# Patient Record
Sex: MALE | Race: WHITE | NOT HISPANIC OR LATINO | Employment: OTHER | URBAN - METROPOLITAN AREA
[De-identification: names, ages, dates, MRNs, and addresses within clinical notes are randomized per-mention and may not be internally consistent; named-entity substitution may affect disease eponyms.]

---

## 2020-07-09 ENCOUNTER — HOSPITAL ENCOUNTER (INPATIENT)
Facility: HOSPITAL | Age: 80
LOS: 8 days | Discharge: NURSING, REHAB OR LONG-TERM CARE | DRG: 312 | End: 2020-07-17
Attending: EMERGENCY MEDICINE | Admitting: INTERNAL MEDICINE
Payer: MEDICARE

## 2020-07-09 DIAGNOSIS — R55 SYNCOPE, UNSPECIFIED SYNCOPE TYPE: Primary | ICD-10-CM

## 2020-07-09 DIAGNOSIS — R00.1 SYMPTOMATIC BRADYCARDIA: ICD-10-CM

## 2020-07-09 PROBLEM — F03.90 DEMENTIA: Status: ACTIVE | Noted: 2020-07-09

## 2020-07-09 PROBLEM — N18.30 CKD (CHRONIC KIDNEY DISEASE), STAGE III: Status: ACTIVE | Noted: 2020-07-09

## 2020-07-09 LAB
ALBUMIN SERPL BCP-MCNC: 3 G/DL (ref 3.5–5.2)
ALP SERPL-CCNC: 47 U/L (ref 55–135)
ALT SERPL W/O P-5'-P-CCNC: 10 U/L (ref 10–44)
ANION GAP SERPL CALC-SCNC: 10 MMOL/L (ref 8–16)
AST SERPL-CCNC: 17 U/L (ref 10–40)
BACTERIA #/AREA URNS HPF: NEGATIVE /HPF
BASOPHILS # BLD AUTO: 0.01 K/UL (ref 0–0.2)
BASOPHILS NFR BLD: 0.2 % (ref 0–1.9)
BILIRUB SERPL-MCNC: 0.6 MG/DL (ref 0.1–1)
BILIRUB UR QL STRIP: NEGATIVE
BNP SERPL-MCNC: 688 PG/ML (ref 0–99)
BUN SERPL-MCNC: 26 MG/DL (ref 8–23)
CALCIUM SERPL-MCNC: 8.3 MG/DL (ref 8.7–10.5)
CHLORIDE SERPL-SCNC: 106 MMOL/L (ref 95–110)
CLARITY UR: CLEAR
CO2 SERPL-SCNC: 24 MMOL/L (ref 23–29)
COLOR UR: YELLOW
CREAT SERPL-MCNC: 1.5 MG/DL (ref 0.5–1.4)
DIFFERENTIAL METHOD: ABNORMAL
EOSINOPHIL # BLD AUTO: 0 K/UL (ref 0–0.5)
EOSINOPHIL NFR BLD: 0.6 % (ref 0–8)
ERYTHROCYTE [DISTWIDTH] IN BLOOD BY AUTOMATED COUNT: 13.7 % (ref 11.5–14.5)
EST. GFR  (AFRICAN AMERICAN): 49.8 ML/MIN/1.73 M^2
EST. GFR  (NON AFRICAN AMERICAN): 43 ML/MIN/1.73 M^2
GLUCOSE SERPL-MCNC: 126 MG/DL (ref 70–110)
GLUCOSE UR QL STRIP: NEGATIVE
HCT VFR BLD AUTO: 33.2 % (ref 40–54)
HGB BLD-MCNC: 10.8 G/DL (ref 14–18)
HGB UR QL STRIP: NEGATIVE
HYALINE CASTS #/AREA URNS LPF: 21 /LPF
IMM GRANULOCYTES # BLD AUTO: 0.02 K/UL (ref 0–0.04)
IMM GRANULOCYTES NFR BLD AUTO: 0.4 % (ref 0–0.5)
INR PPP: 1.3
KETONES UR QL STRIP: ABNORMAL
LEUKOCYTE ESTERASE UR QL STRIP: NEGATIVE
LYMPHOCYTES # BLD AUTO: 0.7 K/UL (ref 1–4.8)
LYMPHOCYTES NFR BLD: 14.3 % (ref 18–48)
MAGNESIUM SERPL-MCNC: 2 MG/DL (ref 1.6–2.6)
MCH RBC QN AUTO: 33.1 PG (ref 27–31)
MCHC RBC AUTO-ENTMCNC: 32.5 G/DL (ref 32–36)
MCV RBC AUTO: 102 FL (ref 82–98)
MICROSCOPIC COMMENT: ABNORMAL
MONOCYTES # BLD AUTO: 0.2 K/UL (ref 0.3–1)
MONOCYTES NFR BLD: 3.2 % (ref 4–15)
NEUTROPHILS # BLD AUTO: 4 K/UL (ref 1.8–7.7)
NEUTROPHILS NFR BLD: 81.3 % (ref 38–73)
NITRITE UR QL STRIP: NEGATIVE
NRBC BLD-RTO: 0 /100 WBC
PH UR STRIP: 6 [PH] (ref 5–8)
PLATELET # BLD AUTO: 183 K/UL (ref 150–350)
PMV BLD AUTO: 10.3 FL (ref 9.2–12.9)
POTASSIUM SERPL-SCNC: 3.7 MMOL/L (ref 3.5–5.1)
PROT SERPL-MCNC: 5.6 G/DL (ref 6–8.4)
PROT UR QL STRIP: ABNORMAL
PROTHROMBIN TIME: 15.2 SEC (ref 10.6–14.8)
RBC # BLD AUTO: 3.26 M/UL (ref 4.6–6.2)
RBC #/AREA URNS HPF: 0 /HPF (ref 0–4)
SARS-COV-2 RDRP RESP QL NAA+PROBE: NEGATIVE
SODIUM SERPL-SCNC: 140 MMOL/L (ref 136–145)
SP GR UR STRIP: 1.02 (ref 1–1.03)
SQUAMOUS #/AREA URNS HPF: 4 /HPF
TROPONIN I SERPL DL<=0.01 NG/ML-MCNC: <0.03 NG/ML
URN SPEC COLLECT METH UR: ABNORMAL
UROBILINOGEN UR STRIP-ACNC: ABNORMAL EU/DL
WBC # BLD AUTO: 4.97 K/UL (ref 3.9–12.7)
WBC #/AREA URNS HPF: 2 /HPF (ref 0–5)

## 2020-07-09 PROCEDURE — 96360 HYDRATION IV INFUSION INIT: CPT

## 2020-07-09 PROCEDURE — 84484 ASSAY OF TROPONIN QUANT: CPT | Mod: 91

## 2020-07-09 PROCEDURE — U0002 COVID-19 LAB TEST NON-CDC: HCPCS

## 2020-07-09 PROCEDURE — 99285 EMERGENCY DEPT VISIT HI MDM: CPT | Mod: 25

## 2020-07-09 PROCEDURE — 85025 COMPLETE CBC W/AUTO DIFF WBC: CPT

## 2020-07-09 PROCEDURE — 93005 ELECTROCARDIOGRAM TRACING: CPT | Performed by: INTERNAL MEDICINE

## 2020-07-09 PROCEDURE — 83735 ASSAY OF MAGNESIUM: CPT

## 2020-07-09 PROCEDURE — 25000003 PHARM REV CODE 250: Performed by: EMERGENCY MEDICINE

## 2020-07-09 PROCEDURE — 36415 COLL VENOUS BLD VENIPUNCTURE: CPT

## 2020-07-09 PROCEDURE — 80053 COMPREHEN METABOLIC PANEL: CPT

## 2020-07-09 PROCEDURE — 84443 ASSAY THYROID STIM HORMONE: CPT

## 2020-07-09 PROCEDURE — 81001 URINALYSIS AUTO W/SCOPE: CPT

## 2020-07-09 PROCEDURE — 21000000 HC CCU ICU ROOM CHARGE

## 2020-07-09 PROCEDURE — 83880 ASSAY OF NATRIURETIC PEPTIDE: CPT

## 2020-07-09 PROCEDURE — 84484 ASSAY OF TROPONIN QUANT: CPT

## 2020-07-09 PROCEDURE — 85610 PROTHROMBIN TIME: CPT

## 2020-07-09 RX ORDER — SODIUM CHLORIDE 9 MG/ML
INJECTION, SOLUTION INTRAVENOUS CONTINUOUS
Status: ACTIVE | OUTPATIENT
Start: 2020-07-09 | End: 2020-07-10

## 2020-07-09 RX ORDER — ONDANSETRON 2 MG/ML
4 INJECTION INTRAMUSCULAR; INTRAVENOUS EVERY 8 HOURS PRN
Status: DISCONTINUED | OUTPATIENT
Start: 2020-07-09 | End: 2020-07-10

## 2020-07-09 RX ORDER — TRAMADOL HYDROCHLORIDE 50 MG/1
50 TABLET ORAL EVERY 6 HOURS PRN
Status: DISCONTINUED | OUTPATIENT
Start: 2020-07-09 | End: 2020-07-17

## 2020-07-09 RX ORDER — SODIUM CHLORIDE 0.9 % (FLUSH) 0.9 %
10 SYRINGE (ML) INJECTION
Status: DISCONTINUED | OUTPATIENT
Start: 2020-07-09 | End: 2020-07-17 | Stop reason: HOSPADM

## 2020-07-09 RX ORDER — PANTOPRAZOLE SODIUM 40 MG/10ML
40 INJECTION, POWDER, LYOPHILIZED, FOR SOLUTION INTRAVENOUS DAILY
Status: DISCONTINUED | OUTPATIENT
Start: 2020-07-10 | End: 2020-07-12

## 2020-07-09 RX ORDER — FUROSEMIDE 20 MG/1
20 TABLET ORAL DAILY
Status: DISCONTINUED | OUTPATIENT
Start: 2020-07-10 | End: 2020-07-12

## 2020-07-09 RX ORDER — TALC
6 POWDER (GRAM) TOPICAL NIGHTLY PRN
Status: DISCONTINUED | OUTPATIENT
Start: 2020-07-09 | End: 2020-07-17 | Stop reason: HOSPADM

## 2020-07-09 RX ORDER — ATORVASTATIN CALCIUM 10 MG/1
10 TABLET, FILM COATED ORAL NIGHTLY
Status: DISCONTINUED | OUTPATIENT
Start: 2020-07-09 | End: 2020-07-17 | Stop reason: HOSPADM

## 2020-07-09 RX ORDER — ACETAMINOPHEN 325 MG/1
650 TABLET ORAL EVERY 8 HOURS PRN
Status: DISCONTINUED | OUTPATIENT
Start: 2020-07-09 | End: 2020-07-17 | Stop reason: HOSPADM

## 2020-07-09 RX ADMIN — SODIUM CHLORIDE 1000 ML: 0.9 INJECTION, SOLUTION INTRAVENOUS at 06:07

## 2020-07-09 NOTE — ED PROVIDER NOTES
Encounter Date: 2020       History     Chief Complaint   Patient presents with    near syncopal     found on porch by family, diaphoretic, pale and confused, hx dementia     Emergency department with generalized malaise and weakness and was in front of the house and appear to have passed out and when EMS arrived patient was clammy and diaphoretic and cold and pale and bradycardic and slowly woke up after he received IV fluids.  Patient has dementia.  Denies any complaints.  Denies any trauma.  Patient able to move all extremities.  Patient not taking any medication at this time.  Denies chest pain or shortness of breath.        Review of patient's allergies indicates:  No Known Allergies  Past Medical History:   Diagnosis Date    Dementia     GERD (gastroesophageal reflux disease)     Hypertension     Sleep apnea     does not use machine    Wears glasses      Past Surgical History:   Procedure Laterality Date    APPENDECTOMY      CHOLECYSTECTOMY      FINGER SURGERY      laceration    HERNIA REPAIR       No family history on file.  Social History     Tobacco Use    Smoking status: Former Smoker     Quit date:      Years since quittin.5   Substance Use Topics    Alcohol use: No    Drug use: No     Review of Systems   Unable to perform ROS: Dementia   Neurological: Positive for syncope and weakness.   All other systems reviewed and are negative.      Physical Exam     Initial Vitals   BP Pulse Resp Temp SpO2   20 1714 20 1714 20 1714 20 1714 20 1739   (!) 156/76 (!) 50 18 98.5 °F (36.9 °C) 98 %      MAP       --                Physical Exam    Nursing note and vitals reviewed.  Constitutional: He appears well-developed and well-nourished.   HENT:   Head: Normocephalic and atraumatic.   Nose: Nose normal.   Eyes: Conjunctivae and EOM are normal.   Neck: Normal range of motion. No tracheal deviation present.   Cardiovascular: Regular rhythm, normal heart sounds and  intact distal pulses. Exam reveals no friction rub.    No murmur heard.  Bradycardia noted   Pulmonary/Chest: Breath sounds normal. No respiratory distress. He has no wheezes. He has no rales.   Abdominal: Soft. He exhibits no distension. There is no abdominal tenderness.   Musculoskeletal: Normal range of motion.   Neurological: He is alert. He has normal strength. No cranial nerve deficit or sensory deficit.   Oriented to place.  Confused at baseline.  Following commands and moving all extremities spontaneously   Skin: Skin is warm and dry. Capillary refill takes less than 2 seconds.   Psychiatric: He has a normal mood and affect. Thought content normal.         ED Course   Critical Care    Date/Time: 7/9/2020 7:04 PM  Performed by: Patricio Díaz MD  Authorized by: Patricio Díaz MD   Direct patient critical care time: 5 minutes  Documentation critical care time: 5 minutes  Total critical care time (exclusive of procedural time) : 10 minutes        Labs Reviewed   CBC W/ AUTO DIFFERENTIAL - Abnormal; Notable for the following components:       Result Value    RBC 3.26 (*)     Hemoglobin 10.8 (*)     Hematocrit 33.2 (*)     Mean Corpuscular Volume 102 (*)     Mean Corpuscular Hemoglobin 33.1 (*)     Lymph # 0.7 (*)     Mono # 0.2 (*)     Gran% 81.3 (*)     Lymph% 14.3 (*)     Mono% 3.2 (*)     All other components within normal limits   COMPREHENSIVE METABOLIC PANEL - Abnormal; Notable for the following components:    Glucose 126 (*)     BUN, Bld 26 (*)     Creatinine 1.5 (*)     Calcium 8.3 (*)     Total Protein 5.6 (*)     Albumin 3.0 (*)     Alkaline Phosphatase 47 (*)     eGFR if  49.8 (*)     eGFR if non  43.0 (*)     All other components within normal limits   B-TYPE NATRIURETIC PEPTIDE - Abnormal; Notable for the following components:     (*)     All other components within normal limits   PROTIME-INR - Abnormal; Notable for the following components:    PT 15.2 (*)     All  other components within normal limits   TROPONIN I   MAGNESIUM   SARS-COV-2 RNA AMPLIFICATION, QUAL     EKG Readings: (Independently Interpreted)   Initial Reading: No STEMI. Rhythm: Sinus Bradycardia. Ectopy: No Ectopy. Conduction: 1st Degree AV Block.       Imaging Results          CT Head Without Contrast (Final result)  Result time 07/09/20 18:03:12    Final result by Ty Gamez MD (07/09/20 18:03:12)                 Narrative:    CMS MANDATED QUALITY DATA - CT RADIATION 436    All CT scans at this facility utilize dose modulation, iterative  reconstruction, and/or weight based dosing when appropriate to reduce  radiation dose to as low as reasonably achievable.    CLINICAL HISTORY:  79 years (1940) Male Syncope, recurrent    TECHNIQUE:  CT HEAD WITHOUT CONTRAST. 113 images obtained. Axial CT of the brain  without contrast using soft tissue and bone algorithm. Please note in  the acute setting if there is a clinical concern for an acute stroke  MRI would be more sensitive/specific for evaluation of ischemia.    COMPARISON:  CT most recently from August 13, 2017.    FINDINGS:  No acute intracranial hemorrhage, edema or mass effect, and no acute  parenchymal abnormality. There is no hydrocephalus, evidence of  herniation or midline shift. The basal and suprasellar cisterns are  within normal limits. The osseous structures show no acute skull  fracture.    Mild cerebral and cerebellar atrophy when accounting for age slightly  more pronounced in the anterior frontal and temporal lobes with  periventricular deep cerebral white matter low attenuation,  nonspecific findings which can be seen in any diffuse white matter  process but most commonly associated with chronic microvascular  ischemic disease. There are scattered atheromatous calcifications in  the intracranial internal carotid arteries.    Orbital contents appear within normal limits. External auditory  canals are unremarkable. Bilateral rounded  soft tissue attenuation  structures in the maxillary sinuses favored to represent mucosal  retention cysts/mucocele is.    IMPRESSION:  1. No acute intracranial process.  2. Chronic/involutional findings as noted above.  3. Maxillary mucosal retention cyst versus mucoceles less likely  polyps, unchanged.                  .    Electronically Signed by ASHLEY Ugarte on 7/9/2020 6:04 PM                             X-Ray Chest AP Portable (Final result)  Result time 07/09/20 17:37:11    Final result by Ty Gamez MD (07/09/20 17:37:11)                 Narrative:    CLINICAL HISTORY:  79 years (1940) Male CHF near syncopal episode    TECHNIQUE:  Portable AP radiograph the chest.    COMPARISON:  Most recent radiograph from August 13, 2017    FINDINGS:  The lungs are clear. Costophrenic angles are seen without effusion. No  pneumothorax is identified. The heart is normal in size. The aorta  appears tortuous, a finding usually associated with either  atherosclerosis or systemic hypertension.  Osseous structures show  degenerative disc disease and degenerative changes in the shoulders.  The upper abdomen shows a small size hiatal hernia.    IMPRESSION:  No acute cardiac or pulmonary process.                  .            Electronically Signed by ASHLEY Ugarte on 7/9/2020 5:40 PM                               Medical Decision Making:   Differential Diagnosis:   79-year-old male presented emergency department after a syncopal episode.  Patient was initially pale and bradycardic.  Patient's symptoms improved with hydration.  Patient currently is back to baseline and asymptomatic.  Patient was briefly confused after that episode.  Screening labs reviewed.  BNP is elevated.  Patient does have slight worsening of renal function.  Patient hydrated in the emergency department and EKG showed bradycardia with first-degree heart block.  Given patient symptomatic Hospital Medicine consulted for evaluation  for admission and further management.  Pacer pads placed on chest however patient remained hemodynamically stable in the emergency department.  Hospital Medicine to evaluate the patient for admission.  Patient's son who has the power of  said patient is a DNR and would not want to be resuscitated and patient does have baseline dementia  Clinical Tests:   Lab Tests: Reviewed  Radiological Study: Reviewed  Medical Tests: Reviewed                                 Clinical Impression:       ICD-10-CM ICD-9-CM   1. Syncope, unspecified syncope type  R55 780.2   2. Symptomatic bradycardia  R00.1 427.89             ED Disposition Condition    Admit                           Patricio Díaz MD  07/09/20 1901       Patricio Díaz MD  07/09/20 1909

## 2020-07-10 ENCOUNTER — CLINICAL SUPPORT (OUTPATIENT)
Dept: CARDIOLOGY | Facility: HOSPITAL | Age: 80
DRG: 312 | End: 2020-07-10
Attending: EMERGENCY MEDICINE
Payer: MEDICARE

## 2020-07-10 LAB
ALBUMIN SERPL BCP-MCNC: 2.9 G/DL (ref 3.5–5.2)
ALP SERPL-CCNC: 45 U/L (ref 55–135)
ALT SERPL W/O P-5'-P-CCNC: 10 U/L (ref 10–44)
ANION GAP SERPL CALC-SCNC: 10 MMOL/L (ref 8–16)
AST SERPL-CCNC: 17 U/L (ref 10–40)
BASOPHILS # BLD AUTO: 0 K/UL (ref 0–0.2)
BASOPHILS NFR BLD: 0 % (ref 0–1.9)
BILIRUB SERPL-MCNC: 0.8 MG/DL (ref 0.1–1)
BUN SERPL-MCNC: 24 MG/DL (ref 8–23)
CALCIUM SERPL-MCNC: 8.1 MG/DL (ref 8.7–10.5)
CHLORIDE SERPL-SCNC: 104 MMOL/L (ref 95–110)
CO2 SERPL-SCNC: 25 MMOL/L (ref 23–29)
CREAT SERPL-MCNC: 1.2 MG/DL (ref 0.5–1.4)
DIFFERENTIAL METHOD: ABNORMAL
EOSINOPHIL # BLD AUTO: 0 K/UL (ref 0–0.5)
EOSINOPHIL NFR BLD: 0.5 % (ref 0–8)
ERYTHROCYTE [DISTWIDTH] IN BLOOD BY AUTOMATED COUNT: 13.9 % (ref 11.5–14.5)
EST. GFR  (AFRICAN AMERICAN): >60 ML/MIN/1.73 M^2
EST. GFR  (NON AFRICAN AMERICAN): 56.4 ML/MIN/1.73 M^2
GLUCOSE SERPL-MCNC: 88 MG/DL (ref 70–110)
HCT VFR BLD AUTO: 31.7 % (ref 40–54)
HGB BLD-MCNC: 10.4 G/DL (ref 14–18)
IMM GRANULOCYTES # BLD AUTO: 0 K/UL (ref 0–0.04)
IMM GRANULOCYTES NFR BLD AUTO: 0 % (ref 0–0.5)
LYMPHOCYTES # BLD AUTO: 1.3 K/UL (ref 1–4.8)
LYMPHOCYTES NFR BLD: 31.2 % (ref 18–48)
MCH RBC QN AUTO: 33.2 PG (ref 27–31)
MCHC RBC AUTO-ENTMCNC: 32.8 G/DL (ref 32–36)
MCV RBC AUTO: 101 FL (ref 82–98)
MONOCYTES # BLD AUTO: 0.2 K/UL (ref 0.3–1)
MONOCYTES NFR BLD: 3.9 % (ref 4–15)
NEUTROPHILS # BLD AUTO: 2.6 K/UL (ref 1.8–7.7)
NEUTROPHILS NFR BLD: 64.4 % (ref 38–73)
NRBC BLD-RTO: 0 /100 WBC
PLATELET # BLD AUTO: 164 K/UL (ref 150–350)
PMV BLD AUTO: 10.4 FL (ref 9.2–12.9)
POTASSIUM SERPL-SCNC: 3.7 MMOL/L (ref 3.5–5.1)
PROT SERPL-MCNC: 5.2 G/DL (ref 6–8.4)
RBC # BLD AUTO: 3.13 M/UL (ref 4.6–6.2)
SODIUM SERPL-SCNC: 139 MMOL/L (ref 136–145)
TROPONIN I SERPL DL<=0.01 NG/ML-MCNC: <0.03 NG/ML
TROPONIN I SERPL DL<=0.01 NG/ML-MCNC: <0.03 NG/ML
TSH SERPL DL<=0.005 MIU/L-ACNC: 2.35 UIU/ML (ref 0.34–5.6)
WBC # BLD AUTO: 4.07 K/UL (ref 3.9–12.7)

## 2020-07-10 PROCEDURE — 25000003 PHARM REV CODE 250: Performed by: NURSE PRACTITIONER

## 2020-07-10 PROCEDURE — 94761 N-INVAS EAR/PLS OXIMETRY MLT: CPT

## 2020-07-10 PROCEDURE — 84484 ASSAY OF TROPONIN QUANT: CPT

## 2020-07-10 PROCEDURE — 63600175 PHARM REV CODE 636 W HCPCS: Performed by: INTERNAL MEDICINE

## 2020-07-10 PROCEDURE — 25000003 PHARM REV CODE 250: Performed by: INTERNAL MEDICINE

## 2020-07-10 PROCEDURE — 21000000 HC CCU ICU ROOM CHARGE

## 2020-07-10 PROCEDURE — 85025 COMPLETE CBC W/AUTO DIFF WBC: CPT

## 2020-07-10 PROCEDURE — 99900035 HC TECH TIME PER 15 MIN (STAT)

## 2020-07-10 PROCEDURE — 93005 ELECTROCARDIOGRAM TRACING: CPT | Performed by: INTERNAL MEDICINE

## 2020-07-10 PROCEDURE — 80053 COMPREHEN METABOLIC PANEL: CPT

## 2020-07-10 PROCEDURE — 36415 COLL VENOUS BLD VENIPUNCTURE: CPT

## 2020-07-10 PROCEDURE — 93306 TTE W/DOPPLER COMPLETE: CPT

## 2020-07-10 RX ORDER — ZIPRASIDONE MESYLATE 20 MG/ML
20 INJECTION, POWDER, LYOPHILIZED, FOR SOLUTION INTRAMUSCULAR ONCE
Status: COMPLETED | OUTPATIENT
Start: 2020-07-10 | End: 2020-07-10

## 2020-07-10 RX ORDER — ZIPRASIDONE HYDROCHLORIDE 20 MG/1
20 CAPSULE ORAL ONCE
Status: COMPLETED | OUTPATIENT
Start: 2020-07-10 | End: 2020-07-10

## 2020-07-10 RX ORDER — LORAZEPAM 2 MG/ML
1 INJECTION INTRAMUSCULAR EVERY 4 HOURS PRN
Status: DISCONTINUED | OUTPATIENT
Start: 2020-07-10 | End: 2020-07-17

## 2020-07-10 RX ORDER — ZIPRASIDONE MESYLATE 20 MG/ML
20 INJECTION, POWDER, LYOPHILIZED, FOR SOLUTION INTRAMUSCULAR EVERY 12 HOURS PRN
Status: DISCONTINUED | OUTPATIENT
Start: 2020-07-10 | End: 2020-07-17

## 2020-07-10 RX ADMIN — LORAZEPAM 1 MG: 2 INJECTION INTRAMUSCULAR; INTRAVENOUS at 08:07

## 2020-07-10 RX ADMIN — ZIPRASIDONE MESYLATE 20 MG: 20 INJECTION, POWDER, LYOPHILIZED, FOR SOLUTION INTRAMUSCULAR at 04:07

## 2020-07-10 RX ADMIN — FUROSEMIDE 20 MG: 20 TABLET ORAL at 08:07

## 2020-07-10 RX ADMIN — MELATONIN 6 MG: at 08:07

## 2020-07-10 RX ADMIN — ATORVASTATIN CALCIUM 10 MG: 10 TABLET, FILM COATED ORAL at 08:07

## 2020-07-10 RX ADMIN — ZIPRASIDONE HCL 20 MG: 20 CAPSULE ORAL at 11:07

## 2020-07-10 RX ADMIN — MELATONIN 6 MG: at 12:07

## 2020-07-10 RX ADMIN — ATORVASTATIN CALCIUM 10 MG: 10 TABLET, FILM COATED ORAL at 12:07

## 2020-07-10 NOTE — ED NOTES
Pt has large, brown, formed BM on bedside commode.  Pt has climbed out of bed 3 times, pulled out one of his IV's and continually pulls of monitor leads, pulse ox, and BP cuff.   Nursing supervisor notified of need for sitter to prevent fall.  Sitting will be obtained ASAP

## 2020-07-10 NOTE — H&P
[facility] Medicine History & Physical Examination   Patient Name: Tomasz Chavez  MRN: [id number]  Patient Class: IP- Inpatient   Admission Date: 7/9/2020  5:14 PM  Length of Stay: 0  Attending Physician:   Primary Care Provider: Kendrick Hatfield MD  Face-to-Face encounter date: 07/09/2020  Code Status: DNR (pt pt's son report)  MPOA:Son/Tyler  Chief Complaint: near syncopal (found on porch by family, diaphoretic, pale and confused, hx dementia)        Patient information was obtained from patient, past medical records and ER records.   HISTORY OF PRESENT ILLNESS:   Tomasz Chavez is a 79 y.o. old male who  has a past medical history of Dementia, GERD (gastroesophageal reflux disease), Hypertension, Sleep apnea, and Wears glasses.. The patient presented to [facility] on 7/9/2020 with a primary complaint of near syncopal (found on porch by family, diaphoretic, pale and confused, hx dementia)  .   79-year-old  male presents to emergency room with altered mental status/near-syncope episode.      According to the patient has son the patient was sitting in a recliner outdoors on the porch.  His wife noted him to be confused and not acting himself.  At his baseline he does have mild dementia but is able to perform his ADLs and actually lives alone neck is door to his son.      Nonetheless the patient's son states the patient had been confused and drowsy.  This incident occurred around 1500 today.  They attempted to awake the patient he would open his eyes but he was drowsy and would not respond verbally.      According to the family the patient never had a syncope episode this a decrease in his mentation.  Denies him complaining fever, chills, chest pain, palpitations or any GI symptoms prior to today.  They denied any falls or head trauma.  The describes symptoms as moderate to severe severity with no exacerbating or alleviating factors.      Upon arrival in emergency  room the patient was awake and alert but he was confused and disoriented.  He denied pain.  On multiple occasions he attempted to get out of bed emergency room and he was pulling at his IV and cardiac tubing throughout his ED stay.    His speech is clear and coherent but it does have confused conversation    REVIEW OF SYSTEMS:   10 Point Review of System was performed and was found to be negative except for that mentioned already in the HPI and   Review of Systems (Negative unless checked off)  Review of Systems   Constitutional: Positive for malaise/fatigue.   HENT: Negative.    Eyes: Negative.    Respiratory: Negative.    Cardiovascular: Negative.    Gastrointestinal: Negative.    Genitourinary: Negative.    Musculoskeletal: Positive for joint pain.   Skin: Negative.    Neurological: Positive for weakness.        Near syncope   Endo/Heme/Allergies: Negative.    Psychiatric/Behavioral: Negative.         Dementia           PAST MEDICAL HISTORY:     Past Medical History:   Diagnosis Date    Dementia     GERD (gastroesophageal reflux disease)     Hypertension     Sleep apnea     does not use machine    Wears glasses        PAST SURGICAL HISTORY:     Past Surgical History:   Procedure Laterality Date    APPENDECTOMY      CHOLECYSTECTOMY      FINGER SURGERY      laceration    HERNIA REPAIR         ALLERGIES:   Patient has no known allergies.    FAMILY HISTORY:   History reviewed. No pertinent family history.    SOCIAL HISTORY:     Social History     Tobacco Use    Smoking status: Former Smoker     Quit date:      Years since quittin.5   Substance Use Topics    Alcohol use: No        Social History     Substance and Sexual Activity   Sexual Activity Not on file        HOME MEDICATIONS:     Prior to Admission medications    Medication Sig Start Date End Date Taking? Authorizing Provider   amlodipine (NORVASC) 5 MG tablet 5 mg 2 (two) times daily.  16   Historical Provider, MD   atorvastatin  "(LIPITOR) 10 MG tablet 10 mg nightly.  7/6/16   Historical Provider, MD   cholecalciferol, vitamin D3, (VITAMIN D3) 2,000 unit Cap Take 1 capsule by mouth nightly.    Historical Provider, MD   donepezil (ARICEPT) 10 MG tablet once daily.  8/25/16   Historical Provider, MD   fosinopril (MONOPRIL) 40 MG tablet 2 (two) times daily.  7/25/16   Historical Provider, MD   furosemide (LASIX) 20 MG tablet  8/2/16   Historical Provider, MD   metoprolol succinate (TOPROL-XL) 50 MG 24 hr tablet nightly.  9/9/16   Historical Provider, MD   multivitamin capsule Take 1 capsule by mouth once daily.    Historical Provider, MD   pantoprazole (PROTONIX) 40 MG tablet  9/15/16   Historical Provider, MD   potassium chloride (MICRO-K) 8 mEq CpSR once daily.  8/15/16   Historical Provider, MD   vitamin E 400 UNIT capsule Take 400 Units by mouth once daily.    Historical Provider, MD         PHYSICAL EXAM:   BP (!) 175/89   Pulse 87   Temp 97.7 °F (36.5 °C) (Oral)   Resp 20   Ht 5' 9" (1.753 m)   Wt 86.2 kg (190 lb)   SpO2 (!) 71%   BMI 28.06 kg/m²   Vitals Reviewed  General appearance: Well-developed, well-nourished male in no apparent distress.  Skin: No Rash.   Neuro: Motor and sensory exams grossly intact. Good tone. Power in all 4 extremities 5/5.   HENT: Atraumatic head. Moist mucous membranes of oral cavity.  Eyes: Normal extraocular movements.   Neck: Supple. No evidence of lymphadenopathy. No thyroidomegaly.  Lungs: Clear to auscultation bilaterally. No wheezing present.   Heart: Regular rate and rhythm. S1 and S2 present with + murmur/no gallop/no rub. No pedal edema. No JVD present.   Abdomen: Soft, non-distended, non-tender. No rebound tenderness/guarding. No masses or organomegaly. Bowel sounds are normal. Bladder is not palpable.   Extremities: No cyanosis, clubbing, or edema.  Psych/mental status: Alert and disoriented. Cooperative.confused and agitated H/O dementia  EMERGENCY DEPARTMENT LABS AND IMAGING:   Following " labs were Reviewed   Recent Labs   Lab 07/09/20  1733   WBC 4.97   HGB 10.8*   HCT 33.2*      CALCIUM 8.3*   ALBUMIN 3.0*   PROT 5.6*      K 3.7   CO2 24      BUN 26*   CREATININE 1.5*   ALKPHOS 47*   ALT 10   AST 17   BILITOT 0.6         BMP:   Recent Labs   Lab 07/09/20  1733   *      K 3.7      CO2 24   BUN 26*   CREATININE 1.5*   CALCIUM 8.3*   MG 2.0   , CMP   Recent Labs   Lab 07/09/20  1733      K 3.7      CO2 24   *   BUN 26*   CREATININE 1.5*   CALCIUM 8.3*   PROT 5.6*   ALBUMIN 3.0*   BILITOT 0.6   ALKPHOS 47*   AST 17   ALT 10   ANIONGAP 10   ESTGFRAFRICA 49.8*   EGFRNONAA 43.0*   , CBC   Recent Labs   Lab 07/09/20  1733   WBC 4.97   HGB 10.8*   HCT 33.2*      , INR   Lab Results   Component Value Date    INR 1.3 07/09/2020   , Lipid Panel No results found for: CHOL, HDL, LDLCALC, TRIG, CHOLHDL, Troponin   Recent Labs   Lab 07/09/20  1733   TROPONINI <0.030   , A1C: No results for input(s): HGBA1C in the last 4320 hours. and All labs within the past 24 hours have been reviewed  Microbiology Results (last 7 days)     ** No results found for the last 168 hours. **        CT Head Without Contrast   Final Result      X-Ray Chest AP Portable   Final Result      Ct Head Without Contrast    Result Date: 7/9/2020  CMS MANDATED QUALITY DATA - CT RADIATION 436 All CT scans at this facility utilize dose modulation, iterative reconstruction, and/or weight based dosing when appropriate to reduce radiation dose to as low as reasonably achievable. CLINICAL HISTORY: 79 years (1940) Male Syncope, recurrent TECHNIQUE: CT HEAD WITHOUT CONTRAST. 113 images obtained. Axial CT of the brain without contrast using soft tissue and bone algorithm. Please note in the acute setting if there is a clinical concern for an acute stroke MRI would be more sensitive/specific for evaluation of ischemia. COMPARISON: CT most recently from August 13, 2017. FINDINGS: No acute  intracranial hemorrhage, edema or mass effect, and no acute parenchymal abnormality. There is no hydrocephalus, evidence of herniation or midline shift. The basal and suprasellar cisterns are within normal limits. The osseous structures show no acute skull fracture. Mild cerebral and cerebellar atrophy when accounting for age slightly more pronounced in the anterior frontal and temporal lobes with periventricular deep cerebral white matter low attenuation, nonspecific findings which can be seen in any diffuse white matter process but most commonly associated with chronic microvascular ischemic disease. There are scattered atheromatous calcifications in the intracranial internal carotid arteries. Orbital contents appear within normal limits. External auditory canals are unremarkable. Bilateral rounded soft tissue attenuation structures in the maxillary sinuses favored to represent mucosal retention cysts/mucocele is. IMPRESSION: 1. No acute intracranial process. 2. Chronic/involutional findings as noted above. 3. Maxillary mucosal retention cyst versus mucoceles less likely polyps, unchanged. . Electronically Signed by ASHLEY Ugarte on 7/9/2020 6:04 PM    X-ray Chest Ap Portable    Result Date: 7/9/2020  CLINICAL HISTORY: 79 years (1940) Male CHF near syncopal episode TECHNIQUE: Portable AP radiograph the chest. COMPARISON: Most recent radiograph from August 13, 2017 FINDINGS: The lungs are clear. Costophrenic angles are seen without effusion. No pneumothorax is identified. The heart is normal in size. The aorta appears tortuous, a finding usually associated with either atherosclerosis or systemic hypertension.  Osseous structures show degenerative disc disease and degenerative changes in the shoulders. The upper abdomen shows a small size hiatal hernia. IMPRESSION: No acute cardiac or pulmonary process. . Electronically Signed by ASHLEY Ugarte on 7/9/2020 5:40 PM     Carotid  Bilateral    Result Date: 7/9/2020  EXAM DESCRIPTION: US CAROTID BILATERAL CLINICAL HISTORY: 79 years  Male;  syncope confusion. History of dementia. TECHNIQUE: Grayscale and Doppler (color and pulse) ultrasound of bilateral carotid arteries and the vertebral arteries was performed. Stenosis assessment based on Carotid Artery Stenosis: Gray-Scale and Doppler US DiagnosisSociety of Radiologists in Ultrasound Consensus Conference; Radiology, Nov 2003, Vol. 229:924509 COMPARISON: None FINDINGS: All velocities in cm/sec. Right carotid: CCA PSV: 37.7 cm/s Proximal ICA velocities: 46.8 cm/s (Normal < 124/40) Proximal ICA EDV: 10.3  cm/s Mid ICA PSV: 46.8 cm/s Distal ICA PSV: 45.5 cm/s ICA/CCA PSV ratio: 1.2 (Normal < 2.0) ECA: 45.5 cm/s Right vertebral: Antegrade flow Comment: Minimal plaque. Visually there is no stenosis. Color and spectral waveforms are normal. Heart rate was mildly irregular. Left carotid: CCA PSV: 37.7 cm/s ICA velocities: 21.1  cm/s(Normal < 124/40) Proximal ICA EDV: 6.4 cm/s Mid ICA PSV: 24.6 cm/s Distal ICA PSV: 36.4 cm/s ICA/CCA PSV ratio: 1.0 (Normal < 2.0) ECA: 46.8 cm/s Left vertebral: Antegrade flow Comment: Heart rate is mildly irregular. Calcified plaque is seen at the bulb. There is no stenosis. Color and spectral waveforms are within normal limits. IMPRESSION: Less than 50% stenosis of the internal carotid arteries bilaterally. Irregular heart rate. Electronically signed by:  Michaelle Em MD  7/10/2020 12:38 AM CDT Workstation: [id number]      12 lead EKG reveals a sinus bradycardia at 54, this first-degree AV block, left axis deviation, good R-wave progression, ST flattening throughout  milliseconds  ASSESSMENT & PLAN:   Tomasz Chavez is a 79 y.o. male admitted for    1. Near Syncope  - CT of head Negative  - Neuro checks  - fall precautions  -PT/OT eval and treat after cards consult    2. Symptomatic Bradycardia  - Cards consult  - trend CE/troponin  -Carotid doppler  studies  -Hold all meds that can lower HR  -     3. CKD stage III  - this appears stable  - avoid nephrotoxic medication  -monitor    4. Dementia  - pt have been off Aricept for more then 4 weeks  - very agitated will need sitter    5. Hyperlipidemia  - LFTs stable  - continue statin    6.  DNR status  - spoke to pt's son Tyler gibbs states he has POA and states his father is a DNR    7. H/O sleep apnea  - has not wore CPAP in yrs per son report      DVT Prophylaxis: will be placed on SCDsfor DVT prophylaxis and will be advised to be as mobile as possible and sit in a chair as tolerated.   ________________________________________________________________________________      Face-to-Face encounter date: 07/09/2020  Encounter included review of the medical records, interviewing and examining the patient face-to-face, discussion with family and other health care providers including emergency medicine physician, admission orders, interpreting lab/test results and formulating a plan of care.   Medical Decision Making during this encounter was  [_] Low Complexity  [_] Moderate Complexity  [x] High Complexity  _________________________________________________________________________________    INPATIENT LIST OF MEDICATIONS     Current Facility-Administered Medications:     0.9%  NaCl infusion, , Intravenous, Continuous, Ramya Cervantes NP    acetaminophen tablet 650 mg, 650 mg, Oral, Q8H PRN, Ramya Cervantes NP    atorvastatin tablet 10 mg, 10 mg, Oral, QHS, Ramya Cervantes NP    [START ON 7/10/2020] furosemide tablet 20 mg, 20 mg, Oral, Daily, Ramya Cervantes NP    melatonin tablet 6 mg, 6 mg, Oral, Nightly PRN, Ramya Cervantes NP    ondansetron injection 4 mg, 4 mg, Intravenous, Q8H PRN, Ramya Cervantes NP    [START ON 7/10/2020] pantoprazole injection 40 mg, 40 mg, Intravenous, Daily, Ramya Cervantes NP    sodium chloride 0.9% flush 10 mL, 10 mL, Intravenous, PRN, Ramya Cervantes NP    traMADoL tablet 50 mg, 50 mg, Oral,  Q6H PRN, Ramya Cervantes, NP    Current Outpatient Medications:     amlodipine (NORVASC) 5 MG tablet, 5 mg 2 (two) times daily. , Disp: , Rfl:     atorvastatin (LIPITOR) 10 MG tablet, 10 mg nightly. , Disp: , Rfl:     cholecalciferol, vitamin D3, (VITAMIN D3) 2,000 unit Cap, Take 1 capsule by mouth nightly., Disp: , Rfl:     donepezil (ARICEPT) 10 MG tablet, once daily. , Disp: , Rfl: 2    fosinopril (MONOPRIL) 40 MG tablet, 2 (two) times daily. , Disp: , Rfl:     furosemide (LASIX) 20 MG tablet, , Disp: , Rfl:     metoprolol succinate (TOPROL-XL) 50 MG 24 hr tablet, nightly. , Disp: , Rfl: 2    multivitamin capsule, Take 1 capsule by mouth once daily., Disp: , Rfl:     pantoprazole (PROTONIX) 40 MG tablet, , Disp: , Rfl: 1    potassium chloride (MICRO-K) 8 mEq CpSR, once daily. , Disp: , Rfl:     vitamin E 400 UNIT capsule, Take 400 Units by mouth once daily., Disp: , Rfl:       Scheduled Meds:   atorvastatin  10 mg Oral QHS    [START ON 7/10/2020] furosemide  20 mg Oral Daily    [START ON 7/10/2020] pantoprazole  40 mg Intravenous Daily     Continuous Infusions:   sodium chloride 0.9%       PRN Meds:.acetaminophen, melatonin, ondansetron, sodium chloride 0.9%, traMADoL      Ramya Cervantes  [facility] Hospitalist NP  07/09/2020

## 2020-07-10 NOTE — NURSING
Patient continuing to pull his telemetry monitor off. Pulled out IV multiple times, including biting tubing in half. Biting at armband. Sitter at bedside unable to redirect patient. Patient son came to visit and said that him being there makes his dad more agitated. Dr. Tilley notified of continuing agitation. New orders received and carried out. Will continue to monitor.

## 2020-07-10 NOTE — ED NOTES
Patient continuously getting out of bed, pulling off wires, pulled IV out himself. Sitter now at bedside.

## 2020-07-10 NOTE — PROGRESS NOTES
"[facility] Medicine Progress Note    Subjective:   Has been agitated and combative today. Pulling at lines.   He is disoriented and delirious. Sitter at bedside.      Objective:   Last 24 Hour Vital Signs:  BP  Min: 132/91  Max: 196/95  Temp  Av °F (36.7 °C)  Min: 97.7 °F (36.5 °C)  Max: 98.5 °F (36.9 °C)  Pulse  Av.5  Min: 50  Max: 222  Resp  Av.6  Min: 12  Max: 20  SpO2  Av.3 %  Min: 71 %  Max: 100 %  Height  Av' 9" (175.3 cm)  Min: 5' 9" (175.3 cm)  Max: 5' 9" (175.3 cm)  Weight  Av.2 kg (190 lb 0.6 oz)  Min: 86.2 kg (190 lb 0.6 oz)  Max: 86.2 kg (190 lb 0.6 oz)  I/O last 3 completed shifts:  In: 1000 [IV Piggyback:1000]  Out: -     Physical Examination:  General appearance: Well-developed, well-nourished male in no apparent distress.  Skin: No Rash.   Neuro: Motor and sensory exams grossly intact. Good tone. Power in all 4 extremities 5/5.   HENT: Atraumatic head. Moist mucous membranes of oral cavity.  Eyes: Normal extraocular movements.   Neck: Supple. No evidence of lymphadenopathy. No thyroidomegaly.  Lungs: Clear to auscultation bilaterally. No wheezing present.   Heart: Regular rate and rhythm. S1 and S2 present no gallop/no rub. No pedal edema. No JVD present.   Abdomen: Soft, non-distended, non-tender. No rebound tenderness/guarding. No masses or organomegaly. Bowel sounds are normal. Bladder is not palpable.   Extremities: No cyanosis, clubbing, or edema.  Psych/mental status: Alert and disoriented. Uncooperative. Intermittently agitated     Laboratory:  Laboratory Data Reviewed: yes    Most Recent Data:  CBC:   Lab Results   Component Value Date    WBC 4.07 07/10/2020    HGB 10.4 (L) 07/10/2020    HCT 31.7 (L) 07/10/2020     07/10/2020     (H) 07/10/2020    RDW 13.9 07/10/2020     BMP:   Lab Results   Component Value Date     07/10/2020    K 3.7 07/10/2020     07/10/2020    CO2 25 07/10/2020    BUN 24 (H) 07/10/2020    GLU 88 07/10/2020    " CALCIUM 8.1 (L) 07/10/2020    MG 2.0 07/09/2020     LFTs:   Lab Results   Component Value Date    PROT 5.2 (L) 07/10/2020    ALBUMIN 2.9 (L) 07/10/2020    BILITOT 0.8 07/10/2020    AST 17 07/10/2020    ALKPHOS 45 (L) 07/10/2020    ALT 10 07/10/2020     Coags:   Lab Results   Component Value Date    INR 1.3 07/09/2020     FLP:   DM:   Lab Results   Component Value Date    CREATININE 1.2 07/10/2020     Thyroid:   Lab Results   Component Value Date    TSH 2.350 07/09/2020     Anemia:   Cardiac:   Lab Results   Component Value Date    TROPONINI <0.030 07/10/2020     (H) 07/09/2020     Urinalysis:   Lab Results   Component Value Date    COLORU Yellow 07/09/2020    SPECGRAV 1.020 07/09/2020    NITRITE Negative 07/09/2020    KETONESU Trace (A) 07/09/2020    UROBILINOGEN 4.0-6.0 (A) 07/09/2020    WBCUA 2 07/09/2020        Radiology:  Data Reviewed: yes  XR CHEST AP PORTABLE  CT HEAD WITHOUT CONTRAST  US CAROTID BILATERAL      Current Medications:     Infusions:   sodium chloride 0.9%          Scheduled:   atorvastatin  10 mg Oral QHS    furosemide  20 mg Oral Daily    pantoprazole  40 mg Intravenous Daily        PRN:  acetaminophen, melatonin, sodium chloride 0.9%, traMADoL    Lines and Day Number of Therapy:      Microbiology Data:  Reviewed: yes  Microbiology Results (last 7 days)     ** No results found for the last 168 hours. **           Antibiotics and Day Number of Therapy:  Antibiotics (From admission, onward)    None         Antivirals (From admission, onward)    None             Assessment/Plan:     Tomasz Chavez is a 79 y.o.male with       ?Syncopal episode   - CT of head Negative  - Neuro checks  - fall precautions  - difficult given he can't provide hx and this event was unwitnessed     Bradycardia  - cardiology was consulted on admission, will f/u their recs    Dementia w/ acute delirium   - pt have been off Aricept for more then 4 weeks  - very agitated will need sitter    CKD stage III  - this  appears stable  - avoid nephrotoxic medication  -monitor     Hyperlipidemia  - LFTs stable  - continue statin     DNR status  - spoke to pt's son Tyler gibbs states he has POA and states his father is a DNR            Evans Tilley  [facility] Medicine

## 2020-07-10 NOTE — CONSULTS
[facility]  Cardiology consultation       Patient Name: Tomasz Chavez  MRN: [id number]  Patient Class: IP- Inpatient   Admission Date: 7/9/2020  5:14 PM  Length of Stay: 0  Attending Physician:   Primary Care Provider: Kendrick Hatfield MD  Face-to-Face encounter date: 07/09/2020  Code Status: DNR ( pt's son report)  MPOA:Son/Tyler  Chief Complaint: near syncopal (found on porch by family, diaphoretic, pale and confused, hx dementia)        Patient information was obtained from patient, past medical records and ER records.   HISTORY OF PRESENT ILLNESS:   Tomasz Chavez is a 79 y.o. old male who  has a past medical history of Dementia, GERD (gastroesophageal reflux disease), Hypertension, Sleep apnea, and Wears glasses.. The patient presented to [facility] on 7/9/2020 with a primary complaint of near syncopal (found on porch by family, diaphoretic, pale and confused, hx dementia)  .   79-year-old  male presents to emergency room with altered mental status/near-syncope episode.       According to the patient has son the patient was sitting in a recliner outdoors on the porch.  His wife noted him to be confused and not acting himself.  At his baseline he does have mild dementia but is able to perform his ADLs and actually lives alone next  door to his son.       Nonetheless the patient's son states the patient had been confused and drowsy.  This incident occurred around 1500 today.  They attempted to awake the patient he would open his eyes but he was drowsy and would not respond verbally.       According to the family the patient never had a syncope episode this a decrease in his mentation.  Denies him complaining fever, chills, chest pain, palpitations or any GI symptoms prior to today.  They denied any falls or head trauma.  The describes symptoms as moderate to severe severity with no exacerbating or alleviating factors.       Upon arrival in emergency room the  patient was awake and alert but he was confused and disoriented.  He denied pain.  On multiple occasions he attempted to get out of bed emergency room and he was pulling at his IV and cardiac tubing throughout his ED stay.    His speech is clear and coherent but it does have confused conversation  There is no history of myocardial infarction angina pectoris or nitroglycerin use.  There is no history of congestive heart failure paroxysmal nocturnal dyspnea or orthopnea.     REVIEW OF SYSTEMS:   10 Point Review of System was performed and was found to be negative except for that mentioned already in the HPI and   Review of Systems (Negative unless checked off)  Review of Systems   Constitutional: Positive for malaise/fatigue.   HENT: Negative.    Eyes: Negative.    Respiratory: Negative.    Cardiovascular: Negative.    Gastrointestinal: Negative.    Genitourinary: Negative.    Musculoskeletal: Positive for joint pain.   Skin: Negative.    Neurological: Positive for weakness.        Near syncope   Endo/Heme/Allergies: Negative.    Psychiatric/Behavioral: Negative.         Dementia            PAST MEDICAL HISTORY:           Past Medical History:   Diagnosis Date    Dementia since ; he has deteriorated over the past 3 years since his wife's demise      GERD (gastroesophageal reflux disease)      Hypertension      Sleep apnea       does not use machine    Wears glasses          PAST SURGICAL HISTORY:            Past Surgical History:   Procedure Laterality Date    APPENDECTOMY        CHOLECYSTECTOMY        FINGER SURGERY         laceration    HERNIA REPAIR            ALLERGIES:   Patient has no known allergies.     FAMILY HISTORY:   History reviewed. No pertinent family history.     SOCIAL HISTORY:      Social History            Tobacco Use    Smoking status: Former Smoker       Quit date:        Years since quittin.5   Substance Use Topics    Alcohol use: No         Social History      Substance  "and Sexual Activity   Sexual Activity Not on file         HOME MEDICATIONS:              Prior to Admission medications    Medication Sig Start Date End Date Taking? Authorizing Provider   amlodipine (NORVASC) 5 MG tablet 5 mg 2 (two) times daily.  8/2/16     Historical Provider, MD   atorvastatin (LIPITOR) 10 MG tablet 10 mg nightly.  7/6/16     Historical Provider, MD   cholecalciferol, vitamin D3, (VITAMIN D3) 2,000 unit Cap Take 1 capsule by mouth nightly.       Historical Provider, MD   donepezil (ARICEPT) 10 MG tablet once daily.  8/25/16     Historical Provider, MD   fosinopril (MONOPRIL) 40 MG tablet 2 (two) times daily.  7/25/16     Historical Provider, MD   furosemide (LASIX) 20 MG tablet   8/2/16     Historical Provider, MD   metoprolol succinate (TOPROL-XL) 50 MG 24 hr tablet nightly.  9/9/16     Historical Provider, MD   multivitamin capsule Take 1 capsule by mouth once daily.       Historical Provider, MD   pantoprazole (PROTONIX) 40 MG tablet   9/15/16     Historical Provider, MD   potassium chloride (MICRO-K) 8 mEq CpSR once daily.  8/15/16     Historical Provider, MD   vitamin E 400 UNIT capsule Take 400 Units by mouth once daily.       Historical Provider, MD            PHYSICAL EXAM:   BP (!) 175/89   Pulse 87   Temp 97.7 °F (36.5 °C) (Oral)   Resp 20   Ht 5' 9" (1.753 m)   Wt 86.2 kg (190 lb)   SpO2 (!) 71%   BMI 28.06 kg/m²   Vitals Reviewed  General appearance: Well-developed, well-nourished male in no apparent distress.  Skin: No Rash.   Neuro: Motor and sensory exams grossly intact. Good tone. Power in all 4 extremities 5/5.   HENT: Atraumatic head. Moist mucous membranes of oral cavity.  Eyes: Normal extraocular movements.   Neck: Supple. No evidence of lymphadenopathy. No thyroidomegaly.  Lungs: Clear to auscultation bilaterally. No wheezing present.   Heart: Regular rate and rhythm. S1 and S2 present with + murmur/no gallop/no rub. No pedal edema. No JVD present.   Abdomen: Soft, " non-distended, non-tender. No rebound tenderness/guarding. No masses or organomegaly. Bowel sounds are normal. Bladder is not palpable.   Extremities: No cyanosis, clubbing, or edema.  Psych/mental status: Alert and disoriented. Cooperative.confused and agitated H/O dementia  EMERGENCY DEPARTMENT LABS AND IMAGING:   Following labs were Reviewed       Recent Labs   Lab 07/09/20  1733   WBC 4.97   HGB 10.8*   HCT 33.2*      CALCIUM 8.3*   ALBUMIN 3.0*   PROT 5.6*      K 3.7   CO2 24      BUN 26*   CREATININE 1.5*   ALKPHOS 47*   ALT 10   AST 17   BILITOT 0.6           BMP:       Recent Labs   Lab 07/09/20  1733   *      K 3.7      CO2 24   BUN 26*   CREATININE 1.5*   CALCIUM 8.3*   MG 2.0   , CMP       Recent Labs   Lab 07/09/20  1733      K 3.7      CO2 24   *   BUN 26*   CREATININE 1.5*   CALCIUM 8.3*   PROT 5.6*   ALBUMIN 3.0*   BILITOT 0.6   ALKPHOS 47*   AST 17   ALT 10   ANIONGAP 10   ESTGFRAFRICA 49.8*   EGFRNONAA 43.0*   , CBC       Recent Labs   Lab 07/09/20  1733   WBC 4.97   HGB 10.8*   HCT 33.2*      , INR         Lab Results   Component Value Date     INR 1.3 07/09/2020   , Lipid Panel No results found for: CHOL, HDL, LDLCALC, TRIG, CHOLHDL, Troponin       Recent Labs   Lab 07/09/20  1733   TROPONINI <0.030   , A1C: No results for input(s): HGBA1C in the last 4320 hours. and All labs within the past 24 hours have been reviewed      Microbiology Results (last 7 days)      ** No results found for the last 168 hours. **         CT Head Without Contrast   Final Result       X-Ray Chest AP Portable   Final Result       Ct Head Without Contrast     Result Date: 7/9/2020  CMS MANDATED QUALITY DATA - CT RADIATION 436 All CT scans at this facility utilize dose modulation, iterative reconstruction, and/or weight based dosing when appropriate to reduce radiation dose to as low as reasonably achievable. CLINICAL HISTORY: 79 years (1940) Male  Syncope, recurrent TECHNIQUE: CT HEAD WITHOUT CONTRAST. 113 images obtained. Axial CT of the brain without contrast using soft tissue and bone algorithm. Please note in the acute setting if there is a clinical concern for an acute stroke MRI would be more sensitive/specific for evaluation of ischemia. COMPARISON: CT most recently from August 13, 2017. FINDINGS: No acute intracranial hemorrhage, edema or mass effect, and no acute parenchymal abnormality. There is no hydrocephalus, evidence of herniation or midline shift. The basal and suprasellar cisterns are within normal limits. The osseous structures show no acute skull fracture. Mild cerebral and cerebellar atrophy when accounting for age slightly more pronounced in the anterior frontal and temporal lobes with periventricular deep cerebral white matter low attenuation, nonspecific findings which can be seen in any diffuse white matter process but most commonly associated with chronic microvascular ischemic disease. There are scattered atheromatous calcifications in the intracranial internal carotid arteries. Orbital contents appear within normal limits. External auditory canals are unremarkable. Bilateral rounded soft tissue attenuation structures in the maxillary sinuses favored to represent mucosal retention cysts/mucocele is. IMPRESSION: 1. No acute intracranial process. 2. Chronic/involutional findings as noted above. 3. Maxillary mucosal retention cyst versus mucoceles less likely polyps, unchanged. . Electronically Signed by ASHLEY Ugarte on 7/9/2020 6:04 PM     X-ray Chest Ap Portable     Result Date: 7/9/2020  CLINICAL HISTORY: 79 years (1940) Male CHF near syncopal episode TECHNIQUE: Portable AP radiograph the chest. COMPARISON: Most recent radiograph from August 13, 2017 FINDINGS: The lungs are clear. Costophrenic angles are seen without effusion. No pneumothorax is identified. The heart is normal in size. The aorta appears tortuous, a  finding usually associated with either atherosclerosis or systemic hypertension.  Osseous structures show degenerative disc disease and degenerative changes in the shoulders. The upper abdomen shows a small size hiatal hernia. IMPRESSION: No acute cardiac or pulmonary process. . Electronically Signed by ASHLEY Ugarte on 7/9/2020 5:40 PM     Us Carotid Bilateral     Result Date: 7/9/2020  EXAM DESCRIPTION: US CAROTID BILATERAL CLINICAL HISTORY: 79 years  Male;  syncope confusion. History of dementia. TECHNIQUE: Grayscale and Doppler (color and pulse) ultrasound of bilateral carotid arteries and the vertebral arteries was performed. Stenosis assessment based on Carotid Artery Stenosis: Gray-Scale and Doppler US DiagnosisSociety of Radiologists in Ultrasound Consensus Conference; Radiology, Nov 2003, Vol. [id number] COMPARISON: None FINDINGS: All velocities in cm/sec. Right carotid: CCA PSV: 37.7 cm/s Proximal ICA velocities: 46.8 cm/s (Normal < 124/40) Proximal ICA EDV: 10.3  cm/s Mid ICA PSV: 46.8 cm/s Distal ICA PSV: 45.5 cm/s ICA/CCA PSV ratio: 1.2 (Normal < 2.0) ECA: 45.5 cm/s Right vertebral: Antegrade flow Comment: Minimal plaque. Visually there is no stenosis. Color and spectral waveforms are normal. Heart rate was mildly irregular. Left carotid: CCA PSV: 37.7 cm/s ICA velocities: 21.1  cm/s(Normal < 124/40) Proximal ICA EDV: 6.4 cm/s Mid ICA PSV: 24.6 cm/s Distal ICA PSV: 36.4 cm/s ICA/CCA PSV ratio: 1.0 (Normal < 2.0) ECA: 46.8 cm/s Left vertebral: Antegrade flow Comment: Heart rate is mildly irregular. Calcified plaque is seen at the bulb. There is no stenosis. Color and spectral waveforms are within normal limits. IMPRESSION: Less than 50% stenosis of the internal carotid arteries bilaterally. Irregular heart rate. Electronically signed by:  Michaelle Em MD  7/10/2020 12:38 AM CDT Workstation: [id number]        12 lead EKG reveals a sinus bradycardia at 54, this first-degree AV block- mm, left  axis deviation, good R-wave progression, ST flattening throughout  milliseconds  ASSESSMENT & PLAN:   Tomasz Chavez is a 79 y.o. male admitted for     1. Near Syncope / MENTAL STATUS CHANGE  - CT of head Negative  - Neuro checks  - fall precautions  -PT/OT eval and treat after cards consult     2.  Bradycardia  - heart rate is in the 50s; I doubt his symptoms are due to bradycardia.  He continues to be restless, pulling his IV out and monitor leads off  - trend CE/troponin  -Carotid doppler studies  -Hold all meds that can lower HR  He has mild sinus bradycardia but a prolonged CO interval.  Metoprolol has been discontinued.  However, the son reports that he has been off all his medications for the last 2 years!  Will monitor heart rate for the next 24-48 hr.  -      3. CKD stage III  - this appears stable  - avoid nephrotoxic medication  -monitor     4. Dementia  - pt have been off Aricept for more then 4 weeks  - very agitated will need sitter     5. Hyperlipidemia  - LFTs stable  - continue statin     6.  DNR status  - spoke to pt's son Tyler gibbs states he has POA and states his father is a DNR     7. H/O sleep apnea  - has not wore CPAP in yrs per son report        DVT Prophylaxis: will be placed on SCDsfor DVT prophylaxis and will be advised to be as mobile as possible and sit in a chair as tolerated.   ________________________________________________________________________________        Face-to-Face encounter date: 07/09/2020  Encounter included review of the medical records, interviewing and examining the patient face-to-face, discussion with family and other health care providers including emergency medicine physician, admission orders, interpreting lab/test results and formulating a plan of care.   Medical Decision Making during this encounter was  [_] Low Complexity  [_] Moderate Complexity  [x] High  Complexity  _________________________________________________________________________________     INPATIENT LIST OF MEDICATIONS     Current Facility-Administered Medications:     0.9%  NaCl infusion, , Intravenous, Continuous, Ramya Cervantes NP    acetaminophen tablet 650 mg, 650 mg, Oral, Q8H PRN, Ramya Cervantes NP    atorvastatin tablet 10 mg, 10 mg, Oral, QHS, Ramya Cervantes NP    [START ON 7/10/2020] furosemide tablet 20 mg, 20 mg, Oral, Daily, Ramya Cervantes NP    melatonin tablet 6 mg, 6 mg, Oral, Nightly PRN, Ramya Cervantes NP    ondansetron injection 4 mg, 4 mg, Intravenous, Q8H PRN, Ramya Cervantes NP    [START ON 7/10/2020] pantoprazole injection 40 mg, 40 mg, Intravenous, Daily, Ramya Cervantes NP    sodium chloride 0.9% flush 10 mL, 10 mL, Intravenous, PRN, Ramya Cervantes NP    traMADoL tablet 50 mg, 50 mg, Oral, Q6H PRN, Ramya Cervantes NP     Current Outpatient Medications:     amlodipine (NORVASC) 5 MG tablet, 5 mg 2 (two) times daily. , Disp: , Rfl:     atorvastatin (LIPITOR) 10 MG tablet, 10 mg nightly. , Disp: , Rfl:     cholecalciferol, vitamin D3, (VITAMIN D3) 2,000 unit Cap, Take 1 capsule by mouth nightly., Disp: , Rfl:     donepezil (ARICEPT) 10 MG tablet, once daily. , Disp: , Rfl: 2    fosinopril (MONOPRIL) 40 MG tablet, 2 (two) times daily. , Disp: , Rfl:     furosemide (LASIX) 20 MG tablet, , Disp: , Rfl:     metoprolol succinate (TOPROL-XL) 50 MG 24 hr tablet, nightly. , Disp: , Rfl: 2    multivitamin capsule, Take 1 capsule by mouth once daily., Disp: , Rfl:     pantoprazole (PROTONIX) 40 MG tablet, , Disp: , Rfl: 1    potassium chloride (MICRO-K) 8 mEq CpSR, once daily. , Disp: , Rfl:     vitamin E 400 UNIT capsule, Take 400 Units by mouth once daily., Disp: , Rfl:         Scheduled Meds:   atorvastatin  10 mg Oral QHS    [START ON 7/10/2020] furosemide  20 mg Oral Daily    [START ON 7/10/2020] pantoprazole  40 mg Intravenous Daily     Continuous Infusions:   sodium  chloride 0.9%       PRN Meds:.acetaminophen, melatonin, ondansetron, sodium chloride 0.9%, traMADoL        TOSHIA Phillips MD [facility]  Cardiology  07/09/2020      Cosigned by: Otilio Loaiza MD at 7/10/2020  6:42 AM   Revision History

## 2020-07-11 LAB
ALBUMIN SERPL BCP-MCNC: 3 G/DL (ref 3.5–5.2)
ALP SERPL-CCNC: 49 U/L (ref 55–135)
ALT SERPL W/O P-5'-P-CCNC: 12 U/L (ref 10–44)
ANION GAP SERPL CALC-SCNC: 8 MMOL/L (ref 8–16)
AORTIC ROOT ANNULUS: 4.56 CM
AORTIC VALVE CUSP SEPERATION: 1.69 CM
AST SERPL-CCNC: 19 U/L (ref 10–40)
AV INDEX (PROSTH): 0.85
AV MEAN GRADIENT: 4 MMHG
AV PEAK GRADIENT: 7 MMHG
AV VALVE AREA: 2.68 CM2
AV VELOCITY RATIO: 68.41
BASOPHILS # BLD AUTO: 0 K/UL (ref 0–0.2)
BASOPHILS NFR BLD: 0 % (ref 0–1.9)
BILIRUB SERPL-MCNC: 0.5 MG/DL (ref 0.1–1)
BSA FOR ECHO PROCEDURE: 2.05 M2
BUN SERPL-MCNC: 20 MG/DL (ref 8–23)
CALCIUM SERPL-MCNC: 8.5 MG/DL (ref 8.7–10.5)
CHLORIDE SERPL-SCNC: 106 MMOL/L (ref 95–110)
CO2 SERPL-SCNC: 28 MMOL/L (ref 23–29)
CREAT SERPL-MCNC: 1.1 MG/DL (ref 0.5–1.4)
CV ECHO LV RWT: 0.78 CM
DIFFERENTIAL METHOD: ABNORMAL
DOP CALC AO PEAK VEL: 1.34 M/S
DOP CALC AO VTI: 27.1 CM
DOP CALC LVOT AREA: 3.1 CM2
DOP CALC LVOT DIAMETER: 2 CM
DOP CALC LVOT PEAK VEL: 91.67 M/S
DOP CALC LVOT STROKE VOLUME: 72.6 CM3
DOP CALCLVOT PEAK VEL VTI: 23.12 CM
E WAVE DECELERATION TIME: 369.22 MSEC
E/A RATIO: 0.66
E/E' RATIO: 7.6 M/S
ECHO LV POSTERIOR WALL: 1.58 CM (ref 0.6–1.1)
EOSINOPHIL # BLD AUTO: 0.1 K/UL (ref 0–0.5)
EOSINOPHIL NFR BLD: 2.9 % (ref 0–8)
ERYTHROCYTE [DISTWIDTH] IN BLOOD BY AUTOMATED COUNT: 13.8 % (ref 11.5–14.5)
EST. GFR  (AFRICAN AMERICAN): >60 ML/MIN/1.73 M^2
EST. GFR  (NON AFRICAN AMERICAN): >60 ML/MIN/1.73 M^2
FRACTIONAL SHORTENING: 29 % (ref 28–44)
GLUCOSE SERPL-MCNC: 106 MG/DL (ref 70–110)
GLUCOSE SERPL-MCNC: 114 MG/DL (ref 70–110)
HCT VFR BLD AUTO: 33.6 % (ref 40–54)
HGB BLD-MCNC: 11.2 G/DL (ref 14–18)
IMM GRANULOCYTES # BLD AUTO: 0.01 K/UL (ref 0–0.04)
IMM GRANULOCYTES NFR BLD AUTO: 0.4 % (ref 0–0.5)
INTERVENTRICULAR SEPTUM: 1.58 CM (ref 0.6–1.1)
LEFT ATRIUM SIZE: 3.5 CM
LEFT INTERNAL DIMENSION IN SYSTOLE: 2.85 CM (ref 2.1–4)
LEFT VENTRICLE DIASTOLIC VOLUME INDEX: 89.26 ML/M2
LEFT VENTRICLE DIASTOLIC VOLUME: 180.41 ML
LEFT VENTRICLE MASS INDEX: 127 G/M2
LEFT VENTRICLE SYSTOLIC VOLUME INDEX: 39.9 ML/M2
LEFT VENTRICLE SYSTOLIC VOLUME: 80.67 ML
LEFT VENTRICULAR INTERNAL DIMENSION IN DIASTOLE: 4.04 CM (ref 3.5–6)
LEFT VENTRICULAR MASS: 256.28 G
LV LATERAL E/E' RATIO: 5.7 M/S
LV SEPTAL E/E' RATIO: 11.4 M/S
LYMPHOCYTES # BLD AUTO: 1 K/UL (ref 1–4.8)
LYMPHOCYTES NFR BLD: 35.9 % (ref 18–48)
MCH RBC QN AUTO: 33.8 PG (ref 27–31)
MCHC RBC AUTO-ENTMCNC: 33.3 G/DL (ref 32–36)
MCV RBC AUTO: 102 FL (ref 82–98)
MONOCYTES # BLD AUTO: 0.1 K/UL (ref 0.3–1)
MONOCYTES NFR BLD: 4.4 % (ref 4–15)
MV PEAK A VEL: 0.86 M/S
MV PEAK E VEL: 0.57 M/S
NEUTROPHILS # BLD AUTO: 1.5 K/UL (ref 1.8–7.7)
NEUTROPHILS NFR BLD: 56.4 % (ref 38–73)
NRBC BLD-RTO: 0 /100 WBC
PISA TR MAX VEL: 2.4 M/S
PLATELET # BLD AUTO: 174 K/UL (ref 150–350)
PMV BLD AUTO: 10.5 FL (ref 9.2–12.9)
POTASSIUM SERPL-SCNC: 3.4 MMOL/L (ref 3.5–5.1)
PROT SERPL-MCNC: 5.6 G/DL (ref 6–8.4)
PV PEAK VELOCITY: 94.11 CM/S
RBC # BLD AUTO: 3.31 M/UL (ref 4.6–6.2)
RIGHT VENTRICULAR END-DIASTOLIC DIMENSION: 372 CM
SODIUM SERPL-SCNC: 142 MMOL/L (ref 136–145)
TDI LATERAL: 0.1 M/S
TDI SEPTAL: 0.05 M/S
TDI: 0.08 M/S
TR MAX PG: 23 MMHG
WBC # BLD AUTO: 2.73 K/UL (ref 3.9–12.7)

## 2020-07-11 PROCEDURE — 36415 COLL VENOUS BLD VENIPUNCTURE: CPT

## 2020-07-11 PROCEDURE — 85025 COMPLETE CBC W/AUTO DIFF WBC: CPT

## 2020-07-11 PROCEDURE — 63600175 PHARM REV CODE 636 W HCPCS: Performed by: INTERNAL MEDICINE

## 2020-07-11 PROCEDURE — 25000003 PHARM REV CODE 250: Performed by: NURSE PRACTITIONER

## 2020-07-11 PROCEDURE — 94761 N-INVAS EAR/PLS OXIMETRY MLT: CPT

## 2020-07-11 PROCEDURE — C9113 INJ PANTOPRAZOLE SODIUM, VIA: HCPCS | Performed by: NURSE PRACTITIONER

## 2020-07-11 PROCEDURE — 99900035 HC TECH TIME PER 15 MIN (STAT)

## 2020-07-11 PROCEDURE — 25000003 PHARM REV CODE 250: Performed by: INTERNAL MEDICINE

## 2020-07-11 PROCEDURE — 63600175 PHARM REV CODE 636 W HCPCS: Performed by: NURSE PRACTITIONER

## 2020-07-11 PROCEDURE — 80053 COMPREHEN METABOLIC PANEL: CPT

## 2020-07-11 PROCEDURE — 21000000 HC CCU ICU ROOM CHARGE

## 2020-07-11 PROCEDURE — 27000221 HC OXYGEN, UP TO 24 HOURS

## 2020-07-11 RX ORDER — FOSINOPIRL SODIUM 10 MG/1
10 TABLET ORAL 2 TIMES DAILY
Status: DISCONTINUED | OUTPATIENT
Start: 2020-07-11 | End: 2020-07-11

## 2020-07-11 RX ORDER — FOSINOPIRL SODIUM 10 MG/1
40 TABLET ORAL 2 TIMES DAILY
Status: DISCONTINUED | OUTPATIENT
Start: 2020-07-11 | End: 2020-07-12

## 2020-07-11 RX ORDER — AMLODIPINE BESYLATE 5 MG/1
5 TABLET ORAL 2 TIMES DAILY
Status: DISCONTINUED | OUTPATIENT
Start: 2020-07-11 | End: 2020-07-17 | Stop reason: HOSPADM

## 2020-07-11 RX ORDER — HYDRALAZINE HYDROCHLORIDE 20 MG/ML
10 INJECTION INTRAMUSCULAR; INTRAVENOUS ONCE
Status: COMPLETED | OUTPATIENT
Start: 2020-07-11 | End: 2020-07-11

## 2020-07-11 RX ORDER — HYDRALAZINE HYDROCHLORIDE 20 MG/ML
10 INJECTION INTRAMUSCULAR; INTRAVENOUS EVERY 6 HOURS PRN
Status: DISCONTINUED | OUTPATIENT
Start: 2020-07-11 | End: 2020-07-11

## 2020-07-11 RX ADMIN — LORAZEPAM 1 MG: 2 INJECTION INTRAMUSCULAR; INTRAVENOUS at 03:07

## 2020-07-11 RX ADMIN — MELATONIN 6 MG: at 08:07

## 2020-07-11 RX ADMIN — FUROSEMIDE 20 MG: 20 TABLET ORAL at 07:07

## 2020-07-11 RX ADMIN — FOSINOPIRL SODIUM 40 MG: 10 TABLET ORAL at 06:07

## 2020-07-11 RX ADMIN — ATORVASTATIN CALCIUM 10 MG: 10 TABLET, FILM COATED ORAL at 08:07

## 2020-07-11 RX ADMIN — HYDRALAZINE HYDROCHLORIDE 10 MG: 20 INJECTION INTRAMUSCULAR; INTRAVENOUS at 07:07

## 2020-07-11 RX ADMIN — AMLODIPINE BESYLATE 5 MG: 5 TABLET ORAL at 04:07

## 2020-07-11 RX ADMIN — HYDRALAZINE HYDROCHLORIDE 10 MG: 20 INJECTION INTRAMUSCULAR; INTRAVENOUS at 09:07

## 2020-07-11 RX ADMIN — LORAZEPAM 1 MG: 2 INJECTION INTRAMUSCULAR; INTRAVENOUS at 08:07

## 2020-07-11 RX ADMIN — PANTOPRAZOLE SODIUM 40 MG: 40 INJECTION, POWDER, FOR SOLUTION INTRAVENOUS at 07:07

## 2020-07-11 NOTE — NURSING
MD Jarek notified of pt's BP both on manual and automatic readings of over 200 systolic and over 100 diastolic. Currently pt is resting in bed and overtly asymptomatic with a 218/112 pressure, however new orders received via telephone w/ readback for BP control. Will continue to update and monitor accordingly.

## 2020-07-11 NOTE — PROGRESS NOTES
[facility] Medicine Progress Note    Subjective:   Much calmer and more cooperative.   Remains disoriented but seems to be at baseline.  Sitter at bedside.   BPs labile today. High this morning and given IV hydralazine after which he became transiently hypotensive.   This afternoon BPs up again.      Objective:   Last 24 Hour Vital Signs:  BP  Min: 144/86  Max: 218/112  Temp  Av.8 °F (36.6 °C)  Min: 97.4 °F (36.3 °C)  Max: 98.3 °F (36.8 °C)  Pulse  Av.2  Min: 52  Max: 75  Resp  Av.2  Min: 17  Max: 23  SpO2  Av.4 %  Min: 96 %  Max: 100 %  I/O last 3 completed shifts:  In: 1000 [IV Piggyback:1000]  Out: 5 [Urine:5]    Physical Examination:  General appearance: Well-developed, well-nourished male in no apparent distress.  Skin: No Rash.   Neuro: Motor and sensory exams grossly intact. Good tone. Power in all 4 extremities 5/5.   HENT: Atraumatic head. Moist mucous membranes of oral cavity.  Eyes: Normal extraocular movements.   Neck: Supple. No evidence of lymphadenopathy. No thyroidomegaly.  Lungs: Clear to auscultation bilaterally. No wheezing present.   Heart: Regular rate and rhythm. S1 and S2 present no gallop/no rub. No pedal edema. No JVD present.   Abdomen: Soft, non-distended, non-tender. No rebound tenderness/guarding. No masses or organomegaly. Bowel sounds are normal. Bladder is not palpable.   Extremities: No cyanosis, clubbing, or edema.  Psych/mental status: Alert. Oriented only to self. Currently calm and cooperative.     Laboratory:  Laboratory Data Reviewed: yes    Most Recent Data:  CBC:   Lab Results   Component Value Date    WBC 2.73 (L) 2020    HGB 11.2 (L) 2020    HCT 33.6 (L) 2020     2020     (H) 2020    RDW 13.8 2020     BMP:   Lab Results   Component Value Date     2020    K 3.4 (L) 2020     2020    CO2 28 2020    BUN 20 2020     2020    CALCIUM 8.5 (L) 2020     MG 2.0 07/09/2020     LFTs:   Lab Results   Component Value Date    PROT 5.6 (L) 07/11/2020    ALBUMIN 3.0 (L) 07/11/2020    BILITOT 0.5 07/11/2020    AST 19 07/11/2020    ALKPHOS 49 (L) 07/11/2020    ALT 12 07/11/2020     Coags:   Lab Results   Component Value Date    INR 1.3 07/09/2020     FLP:   DM:   Lab Results   Component Value Date    CREATININE 1.1 07/11/2020     Thyroid:   Lab Results   Component Value Date    TSH 2.350 07/09/2020     Anemia:   Cardiac:   Lab Results   Component Value Date    TROPONINI <0.030 07/10/2020     (H) 07/09/2020     Urinalysis:   Lab Results   Component Value Date    COLORU Yellow 07/09/2020    SPECGRAV 1.020 07/09/2020    NITRITE Negative 07/09/2020    KETONESU Trace (A) 07/09/2020    UROBILINOGEN 4.0-6.0 (A) 07/09/2020    WBCUA 2 07/09/2020        Radiology:  Data Reviewed: yes  XR CHEST AP PORTABLE  CT HEAD WITHOUT CONTRAST  US CAROTID BILATERAL      Current Medications:     Infusions:       Scheduled:   amLODIPine  5 mg Oral BID    atorvastatin  10 mg Oral QHS    fosinopriL  40 mg Oral BID    furosemide  20 mg Oral Daily    pantoprazole  40 mg Intravenous Daily        PRN:  acetaminophen, lorazepam, melatonin, sodium chloride 0.9%, traMADoL, ziprasidone    Lines and Day Number of Therapy:      Microbiology Data:  Reviewed: yes  Microbiology Results (last 7 days)     ** No results found for the last 168 hours. **           Antibiotics and Day Number of Therapy:  Antibiotics (From admission, onward)    None         Antivirals (From admission, onward)    None             Assessment/Plan:     Tomasz Chavez is a 79 y.o.male with       ?Syncopal episode   - CT of head Negative  - fall precautions  - difficult given he can't provide hx and this event was unwitnessed     Bradycardia  - cardiology was consulted on admission, no intervention indicated currently per Dr. Phillips     Dementia w/ acute delirium   - pt have been off Aricept for more then 4 weeks  - continue w/  remy    CKD stage III  - this appears stable  - avoid nephrotoxic medication  -monitor    HTN, uncontrolled  - restart home PO meds  - bring down slowly, is very sensitive to parenteral agents, especially hydralazine.     Hyperlipidemia  - LFTs stable  - continue statin     DNR status  - spoke to pt's son Tyler how states he has POA and states his father is a DNR    May need placement, as he was apparently living alone prior to this admission? Will contact family for more details.             Evans Tilley  [facility] Medicine

## 2020-07-12 LAB
ALBUMIN SERPL BCP-MCNC: 3 G/DL (ref 3.5–5.2)
ALP SERPL-CCNC: 47 U/L (ref 55–135)
ALT SERPL W/O P-5'-P-CCNC: 12 U/L (ref 10–44)
ANION GAP SERPL CALC-SCNC: 8 MMOL/L (ref 8–16)
AST SERPL-CCNC: 16 U/L (ref 10–40)
BASOPHILS # BLD AUTO: 0.01 K/UL (ref 0–0.2)
BASOPHILS NFR BLD: 0.2 % (ref 0–1.9)
BILIRUB SERPL-MCNC: 0.4 MG/DL (ref 0.1–1)
BUN SERPL-MCNC: 25 MG/DL (ref 8–23)
CALCIUM SERPL-MCNC: 8.3 MG/DL (ref 8.7–10.5)
CHLORIDE SERPL-SCNC: 106 MMOL/L (ref 95–110)
CO2 SERPL-SCNC: 26 MMOL/L (ref 23–29)
CREAT SERPL-MCNC: 1.5 MG/DL (ref 0.5–1.4)
DIFFERENTIAL METHOD: ABNORMAL
EOSINOPHIL # BLD AUTO: 0.1 K/UL (ref 0–0.5)
EOSINOPHIL NFR BLD: 1.2 % (ref 0–8)
ERYTHROCYTE [DISTWIDTH] IN BLOOD BY AUTOMATED COUNT: 14.1 % (ref 11.5–14.5)
EST. GFR  (AFRICAN AMERICAN): 49.8 ML/MIN/1.73 M^2
EST. GFR  (NON AFRICAN AMERICAN): 43 ML/MIN/1.73 M^2
GLUCOSE SERPL-MCNC: 98 MG/DL (ref 70–110)
HCT VFR BLD AUTO: 33.6 % (ref 40–54)
HGB BLD-MCNC: 11.3 G/DL (ref 14–18)
IMM GRANULOCYTES # BLD AUTO: 0.01 K/UL (ref 0–0.04)
IMM GRANULOCYTES NFR BLD AUTO: 0.2 % (ref 0–0.5)
LYMPHOCYTES # BLD AUTO: 1.3 K/UL (ref 1–4.8)
LYMPHOCYTES NFR BLD: 27 % (ref 18–48)
MCH RBC QN AUTO: 33.9 PG (ref 27–31)
MCHC RBC AUTO-ENTMCNC: 33.6 G/DL (ref 32–36)
MCV RBC AUTO: 101 FL (ref 82–98)
MONOCYTES # BLD AUTO: 0.2 K/UL (ref 0.3–1)
MONOCYTES NFR BLD: 3.5 % (ref 4–15)
NEUTROPHILS # BLD AUTO: 3.3 K/UL (ref 1.8–7.7)
NEUTROPHILS NFR BLD: 67.9 % (ref 38–73)
NRBC BLD-RTO: 0 /100 WBC
PLATELET # BLD AUTO: 195 K/UL (ref 150–350)
PMV BLD AUTO: 10.5 FL (ref 9.2–12.9)
POTASSIUM SERPL-SCNC: 3.7 MMOL/L (ref 3.5–5.1)
PROT SERPL-MCNC: 5.5 G/DL (ref 6–8.4)
RBC # BLD AUTO: 3.33 M/UL (ref 4.6–6.2)
SODIUM SERPL-SCNC: 140 MMOL/L (ref 136–145)
WBC # BLD AUTO: 4.86 K/UL (ref 3.9–12.7)

## 2020-07-12 PROCEDURE — 25000003 PHARM REV CODE 250: Performed by: INTERNAL MEDICINE

## 2020-07-12 PROCEDURE — 63600175 PHARM REV CODE 636 W HCPCS: Performed by: INTERNAL MEDICINE

## 2020-07-12 PROCEDURE — 21000000 HC CCU ICU ROOM CHARGE

## 2020-07-12 PROCEDURE — 85025 COMPLETE CBC W/AUTO DIFF WBC: CPT

## 2020-07-12 PROCEDURE — C9113 INJ PANTOPRAZOLE SODIUM, VIA: HCPCS | Performed by: NURSE PRACTITIONER

## 2020-07-12 PROCEDURE — 94761 N-INVAS EAR/PLS OXIMETRY MLT: CPT

## 2020-07-12 PROCEDURE — 63600175 PHARM REV CODE 636 W HCPCS: Performed by: NURSE PRACTITIONER

## 2020-07-12 PROCEDURE — 80053 COMPREHEN METABOLIC PANEL: CPT

## 2020-07-12 PROCEDURE — 99900035 HC TECH TIME PER 15 MIN (STAT)

## 2020-07-12 PROCEDURE — 36415 COLL VENOUS BLD VENIPUNCTURE: CPT

## 2020-07-12 PROCEDURE — 25000003 PHARM REV CODE 250: Performed by: NURSE PRACTITIONER

## 2020-07-12 RX ORDER — ISOSORBIDE DINITRATE 10 MG/1
20 TABLET ORAL 3 TIMES DAILY
Status: COMPLETED | OUTPATIENT
Start: 2020-07-12 | End: 2020-07-13

## 2020-07-12 RX ORDER — HYDROCHLOROTHIAZIDE 25 MG/1
25 TABLET ORAL DAILY
Status: DISCONTINUED | OUTPATIENT
Start: 2020-07-12 | End: 2020-07-12

## 2020-07-12 RX ORDER — PANTOPRAZOLE SODIUM 40 MG/1
40 TABLET, DELAYED RELEASE ORAL DAILY
Status: DISCONTINUED | OUTPATIENT
Start: 2020-07-13 | End: 2020-07-17 | Stop reason: HOSPADM

## 2020-07-12 RX ADMIN — AMLODIPINE BESYLATE 5 MG: 5 TABLET ORAL at 10:07

## 2020-07-12 RX ADMIN — PANTOPRAZOLE SODIUM 40 MG: 40 INJECTION, POWDER, FOR SOLUTION INTRAVENOUS at 10:07

## 2020-07-12 RX ADMIN — FUROSEMIDE 20 MG: 20 TABLET ORAL at 10:07

## 2020-07-12 RX ADMIN — ISOSORBIDE DINITRATE 20 MG: 10 TABLET ORAL at 08:07

## 2020-07-12 RX ADMIN — TRAMADOL HYDROCHLORIDE 50 MG: 50 TABLET, FILM COATED ORAL at 04:07

## 2020-07-12 RX ADMIN — LORAZEPAM 1 MG: 2 INJECTION INTRAMUSCULAR; INTRAVENOUS at 08:07

## 2020-07-12 RX ADMIN — ISOSORBIDE DINITRATE 20 MG: 10 TABLET ORAL at 05:07

## 2020-07-12 RX ADMIN — ATORVASTATIN CALCIUM 10 MG: 10 TABLET, FILM COATED ORAL at 08:07

## 2020-07-12 RX ADMIN — AMLODIPINE BESYLATE 5 MG: 5 TABLET ORAL at 08:07

## 2020-07-12 RX ADMIN — FOSINOPIRL SODIUM 40 MG: 10 TABLET ORAL at 10:07

## 2020-07-12 NOTE — PROGRESS NOTES
Pharmacist Intervention IV to PO Note    Tomasz Chavez is a 79 y.o. male being treated with IV medication Pantoprazole    Patient Data:    Vital Signs (Most Recent):  Temp: 97.7 °F (36.5 °C) (07/12/20 0720)  Pulse: 71 (07/11/20 2140)  Resp: 16 (07/11/20 2140)  BP: (!) 175/89 (07/12/20 0720)  SpO2: 97 % (07/11/20 2140)   Vital Signs (72h Range):  Temp:  [97.4 °F (36.3 °C)-98.5 °F (36.9 °C)]   Pulse:  []   Resp:  [12-23]   BP: (132-218)/()   SpO2:  [71 %-100 %]      CBC:  Recent Labs   Lab 07/10/20  0532 07/11/20  0350 07/12/20  0303   WBC 4.07 2.73* 4.86   RBC 3.13* 3.31* 3.33*   HGB 10.4* 11.2* 11.3*   HCT 31.7* 33.6* 33.6*    174 195   * 102* 101*   MCH 33.2* 33.8* 33.9*   MCHC 32.8 33.3 33.6     CMP:     Recent Labs   Lab 07/10/20  0532 07/11/20  0350 07/12/20  0303   GLU 88 106 98   CALCIUM 8.1* 8.5* 8.3*   ALBUMIN 2.9* 3.0* 3.0*   PROT 5.2* 5.6* 5.5*    142 140   K 3.7 3.4* 3.7   CO2 25 28 26    106 106   BUN 24* 20 25*   CREATININE 1.2 1.1 1.5*   ALKPHOS 45* 49* 47*   ALT 10 12 12   AST 17 19 16   BILITOT 0.8 0.5 0.4       Dietary Orders:  Diet Orders            Diet Cardiac: Cardiac starting at 07/10 0652            Based on the following criteria, this patient qualifies for intravenous to oral conversion:  [x] The patients gastrointestinal tract is functioning (tolerating medications via oral or enteral route for 24 hours and tolerating food or enteral feeds for 24 hours).      Pantoprazole 40 mg IV Daily will be changed to Pantoprazole 40 mg PO Daily.    Pharmacist's Name: Yahir Jack  Pharmacist's Extension: [phone]

## 2020-07-12 NOTE — PROGRESS NOTES
Cardiology follow-up    The patient continues to be agitated.  Heart rate is in the 60s and 70s.  Blood pressure is elevated.  He is on amlodipine 5 mg b.i.d..  Discontinue Lasix.  Add hydrochlorothiazide 25 mg daily.  Creatinine is mildly elevated-ranges between 1.1 and 1.5.

## 2020-07-12 NOTE — PROGRESS NOTES
[facility] Medicine Progress Note    Subjective:   Calm and cooperative overnight.   Remains disoriented but seems to be at baseline.  Sitter at bedside.   BPs still a little on the high side but not as labile.      Objective:   Last 24 Hour Vital Signs:  BP  Min: 155/99  Max: 183/106  Temp  Av.9 °F (36.6 °C)  Min: 97.7 °F (36.5 °C)  Max: 98.3 °F (36.8 °C)  Pulse  Av.3  Min: 71  Max: 83  Resp  Av.5  Min: 16  Max: 20  SpO2  Av %  Min: 97 %  Max: 97 %  I/O last 3 completed shifts:  In: 1000 [P.O.:900; Other:100]  Out: 127 [Urine:127]    Physical Examination:  General appearance: Well-developed, well-nourished male in no apparent distress.  Skin: No Rash.   Neuro: Motor and sensory exams grossly intact. Good tone. Power in all 4 extremities 5/5.   HENT: Atraumatic head. Moist mucous membranes of oral cavity.  Eyes: Normal extraocular movements.   Neck: Supple. No evidence of lymphadenopathy. No thyroidomegaly.  Lungs: Clear to auscultation bilaterally. No wheezing present.   Heart: Regular rate and rhythm. S1 and S2 present no gallop/no rub. No pedal edema. No JVD present.   Abdomen: Soft, non-distended, non-tender. No rebound tenderness/guarding. No masses or organomegaly. Bowel sounds are normal. Bladder is not palpable.   Extremities: No cyanosis, clubbing, or edema.  Psych/mental status: Alert. Oriented only to self. Currently calm and cooperative.     Laboratory:  Laboratory Data Reviewed: yes    Most Recent Data:  CBC:   Lab Results   Component Value Date    WBC 4.86 2020    HGB 11.3 (L) 2020    HCT 33.6 (L) 2020     2020     (H) 2020    RDW 14.1 2020     BMP:   Lab Results   Component Value Date     2020    K 3.7 2020     2020    CO2 26 2020    BUN 25 (H) 2020    GLU 98 2020    CALCIUM 8.3 (L) 2020    MG 2.0 2020     LFTs:   Lab Results   Component Value Date    PROT 5.5 (L)  07/12/2020    ALBUMIN 3.0 (L) 07/12/2020    BILITOT 0.4 07/12/2020    AST 16 07/12/2020    ALKPHOS 47 (L) 07/12/2020    ALT 12 07/12/2020     Coags:   Lab Results   Component Value Date    INR 1.3 07/09/2020     FLP:   DM:   Lab Results   Component Value Date    CREATININE 1.5 (H) 07/12/2020     Thyroid:   Lab Results   Component Value Date    TSH 2.350 07/09/2020     Anemia:   Cardiac:   Lab Results   Component Value Date    TROPONINI <0.030 07/10/2020     (H) 07/09/2020     Urinalysis:   Lab Results   Component Value Date    COLORU Yellow 07/09/2020    SPECGRAV 1.020 07/09/2020    NITRITE Negative 07/09/2020    KETONESU Trace (A) 07/09/2020    UROBILINOGEN 4.0-6.0 (A) 07/09/2020    WBCUA 2 07/09/2020        Radiology:  Data Reviewed: yes  XR CHEST AP PORTABLE  CT HEAD WITHOUT CONTRAST  US CAROTID BILATERAL      Current Medications:     Infusions:       Scheduled:   amLODIPine  5 mg Oral BID    atorvastatin  10 mg Oral QHS    isosorbide dinitrate  20 mg Oral TID    [START ON 7/13/2020] pantoprazole  40 mg Oral Daily        PRN:  acetaminophen, lorazepam, melatonin, sodium chloride 0.9%, traMADoL, ziprasidone    Lines and Day Number of Therapy:      Microbiology Data:  Reviewed: yes  Microbiology Results (last 7 days)     ** No results found for the last 168 hours. **             Assessment/Plan:     Tomasz Chavez is a 79 y.o.male with       ?Syncopal episode   - CT of head Negative  - fall precautions  - difficult given he can't provide hx and this event was unwitnessed     Bradycardia  - cardiology was consulted on admission, no intervention indicated currently per Dr. Phillips. Holding toprol    Dementia w/ acute delirium   - pt have been off Aricept for more then 4 weeks  - continue w/ sitter    CKD stage III  - Hold acei, diuretics with bump in Cr  - avoid nephrotoxic medication  -monitor    HTN, uncontrolled  - continue norvasc  - add isordil      Hyperlipidemia  - LFTs stable  - continue  statin     DNR status  - pt's son Tyler states he has POA and states his father is a DNR    May need placement, as he was apparently living alone prior to this admission? Will contact family for more details.             Evans Tilley  [facility] Medicine

## 2020-07-13 LAB
ANION GAP SERPL CALC-SCNC: 8 MMOL/L (ref 8–16)
BUN SERPL-MCNC: 31 MG/DL (ref 8–23)
CALCIUM SERPL-MCNC: 8.2 MG/DL (ref 8.7–10.5)
CHLORIDE SERPL-SCNC: 103 MMOL/L (ref 95–110)
CO2 SERPL-SCNC: 27 MMOL/L (ref 23–29)
CREAT SERPL-MCNC: 1.1 MG/DL (ref 0.5–1.4)
EST. GFR  (AFRICAN AMERICAN): >60 ML/MIN/1.73 M^2
EST. GFR  (NON AFRICAN AMERICAN): >60 ML/MIN/1.73 M^2
GLUCOSE SERPL-MCNC: 92 MG/DL (ref 70–110)
POTASSIUM SERPL-SCNC: 3.7 MMOL/L (ref 3.5–5.1)
SODIUM SERPL-SCNC: 138 MMOL/L (ref 136–145)

## 2020-07-13 PROCEDURE — 80048 BASIC METABOLIC PNL TOTAL CA: CPT

## 2020-07-13 PROCEDURE — 36415 COLL VENOUS BLD VENIPUNCTURE: CPT

## 2020-07-13 PROCEDURE — 25000003 PHARM REV CODE 250: Performed by: INTERNAL MEDICINE

## 2020-07-13 PROCEDURE — 99900035 HC TECH TIME PER 15 MIN (STAT)

## 2020-07-13 PROCEDURE — 25000003 PHARM REV CODE 250: Performed by: NURSE PRACTITIONER

## 2020-07-13 PROCEDURE — 63700000 PHARM REV CODE 250 ALT 637 W/O HCPCS: Performed by: INTERNAL MEDICINE

## 2020-07-13 PROCEDURE — 21000000 HC CCU ICU ROOM CHARGE

## 2020-07-13 RX ORDER — ISOSORBIDE MONONITRATE 30 MG/1
30 TABLET, EXTENDED RELEASE ORAL DAILY
Status: DISCONTINUED | OUTPATIENT
Start: 2020-07-14 | End: 2020-07-13

## 2020-07-13 RX ORDER — POTASSIUM CHLORIDE 20 MEQ/15ML
20 SOLUTION ORAL ONCE
Status: COMPLETED | OUTPATIENT
Start: 2020-07-13 | End: 2020-07-13

## 2020-07-13 RX ORDER — ISOSORBIDE MONONITRATE 60 MG/1
60 TABLET, EXTENDED RELEASE ORAL DAILY
Status: DISCONTINUED | OUTPATIENT
Start: 2020-07-14 | End: 2020-07-17 | Stop reason: HOSPADM

## 2020-07-13 RX ADMIN — AMLODIPINE BESYLATE 5 MG: 5 TABLET ORAL at 08:07

## 2020-07-13 RX ADMIN — TRAMADOL HYDROCHLORIDE 50 MG: 50 TABLET, FILM COATED ORAL at 11:07

## 2020-07-13 RX ADMIN — ISOSORBIDE DINITRATE 20 MG: 10 TABLET ORAL at 08:07

## 2020-07-13 RX ADMIN — ATORVASTATIN CALCIUM 10 MG: 10 TABLET, FILM COATED ORAL at 08:07

## 2020-07-13 RX ADMIN — POTASSIUM CHLORIDE 20 MEQ: 20 SOLUTION ORAL at 08:07

## 2020-07-13 RX ADMIN — ISOSORBIDE DINITRATE 20 MG: 10 TABLET ORAL at 04:07

## 2020-07-13 RX ADMIN — MELATONIN 6 MG: at 08:07

## 2020-07-13 RX ADMIN — PANTOPRAZOLE SODIUM 40 MG: 40 TABLET, DELAYED RELEASE ORAL at 08:07

## 2020-07-13 NOTE — PROGRESS NOTES
[facility] Medicine Progress Note    Subjective:     Still calm and cooperative today. No major issues overnight.   Sitter at bedside.   BPs better.      Objective:     Last 24 Hour Vital Signs:  BP  Min: 127/69  Max: 167/87  Temp  Av °F (36.7 °C)  Min: 97.7 °F (36.5 °C)  Max: 98.5 °F (36.9 °C)  Pulse  Av.8  Min: 66  Max: 76  Resp  Av.7  Min: 16  Max: 20  SpO2  Av.8 %  Min: 94 %  Max: 99 %  I/O last 3 completed shifts:  In: 600 [P.O.:600]  Out: 100 [Urine:100]    Physical Examination:  General appearance: Well-developed, well-nourished male in no apparent distress.  Skin: No Rash.   Neuro: Motor and sensory exams grossly intact. Good tone. Power in all 4 extremities 5/5.   HENT: Atraumatic head. Moist mucous membranes of oral cavity.  Eyes: Normal extraocular movements.   Neck: Supple. No evidence of lymphadenopathy. No thyroidomegaly.  Lungs: Clear to auscultation bilaterally. No wheezing present.   Heart: Regular rate and rhythm. S1 and S2 present no gallop/no rub. No pedal edema. No JVD present.   Abdomen: Soft, non-distended, non-tender. No rebound tenderness/guarding. No masses or organomegaly. Bowel sounds are normal. Bladder is not palpable.   Extremities: No cyanosis, clubbing, or edema.  Psych/mental status: Alert. Oriented only to self. Currently calm and cooperative.     Laboratory:  Laboratory Data Reviewed: yes    Most Recent Data:  CBC:   Lab Results   Component Value Date    WBC 4.86 2020    HGB 11.3 (L) 2020    HCT 33.6 (L) 2020     2020     (H) 2020    RDW 14.1 2020     BMP:   Lab Results   Component Value Date     2020    K 3.7 2020     2020    CO2 27 2020    BUN 31 (H) 2020    GLU 92 2020    CALCIUM 8.2 (L) 2020    MG 2.0 2020     LFTs:   Lab Results   Component Value Date    PROT 5.5 (L) 2020    ALBUMIN 3.0 (L) 2020    BILITOT 0.4 2020    AST 16  07/12/2020    ALKPHOS 47 (L) 07/12/2020    ALT 12 07/12/2020     Coags:   Lab Results   Component Value Date    INR 1.3 07/09/2020     FLP:   DM:   Lab Results   Component Value Date    CREATININE 1.1 07/13/2020     Thyroid:   Lab Results   Component Value Date    TSH 2.350 07/09/2020     Anemia:   Cardiac:   Lab Results   Component Value Date    TROPONINI <0.030 07/10/2020     (H) 07/09/2020     Urinalysis:   Lab Results   Component Value Date    COLORU Yellow 07/09/2020    SPECGRAV 1.020 07/09/2020    NITRITE Negative 07/09/2020    KETONESU Trace (A) 07/09/2020    UROBILINOGEN 4.0-6.0 (A) 07/09/2020    WBCUA 2 07/09/2020        Radiology:  Data Reviewed: yes  XR CHEST AP PORTABLE  CT HEAD WITHOUT CONTRAST  US CAROTID BILATERAL      Current Medications:     Infusions:       Scheduled:   amLODIPine  5 mg Oral BID    atorvastatin  10 mg Oral QHS    isosorbide dinitrate  20 mg Oral TID    [START ON 7/14/2020] isosorbide mononitrate  60 mg Oral Daily    pantoprazole  40 mg Oral Daily        PRN:  acetaminophen, lorazepam, melatonin, sodium chloride 0.9%, traMADoL, ziprasidone    Lines and Day Number of Therapy:      Microbiology Data:  Reviewed: yes  Microbiology Results (last 7 days)     ** No results found for the last 168 hours. **             Assessment/Plan:     Tomasz Chavez is a 79 y.o.male with       ?Syncopal episode   - CT of head Negative  - fall precautions  - difficult given he can't provide hx and this event was unwitnessed     Bradycardia  - cardiology was consulted on admission, no intervention indicated currently per Dr. Phillips. Holding toprol    Dementia w/ acute delirium   - pt have been off Aricept for more then 4 weeks  - improving  - continue w/ sitter    CKD stage III  - Hold acei, diuretics with bump in Cr  - avoid nephrotoxic medication  -monitor    HTN, uncontrolled  - continue norvasc  - changed isordil to imdur   - avoiding BB or clonidine due to bradycardia  - avoided acei or  diuretics for now with bump in cr - consider restarting home ACEi if Cr remains stable        Hyperlipidemia  - LFTs stable  - continue statin     DNR status  - pt's son Tyler states he has POA and states his father is a DNR      Planning on SNF placement            Evans Tilley  [facility] Medicine

## 2020-07-13 NOTE — PLAN OF CARE
07/13/20 1236   Discharge Reassessment   Assessment Type Discharge Planning Reassessment   Anticipated Discharge Disposition SNF     1230 Referral sent to [facility] for SNF placement. Spoke with pt son, Tyler who is not sure if his father will need long term care yet. He would like to wait and see how he is doing physically prior to making a decision at LTC placement, but would like a SNF referral sent to [facility]. PASRR, LOCET and 142 completed. Confirmed that [facility] received document. but not reviewed.

## 2020-07-13 NOTE — PLAN OF CARE
Vital signs, cardiac and lab monitoring. Increase activity as tolerated. Bed alarm on. Watch for falls. Watch for sign and symptoms of bleeding. Breathing treatments and adjust oxygen as needed. Strict I & O. Daily weights.

## 2020-07-13 NOTE — PROGRESS NOTES
"[facility]  Adult Nutrition   Progress Note (Initial Assessment)     SUMMARY     * Note: Assessment completed remotely by review of EMR and with assistance by registered dietitians and other healthcare team members on premises as needed.*    Recommendations/Interventions:    Recommendation/Intervention: 1. Continue current diet and supplements as tolerated, encourage intake. 2. Provide assistance during meal times to aid in intake 2' hx of dementia.  Goals: 1. Patient to meet at least 75% of estimated needs via PO intake of meals and supplements.  Nutrition Goal Status: new    Dietitian Rounds Brief:  · Patient admitted with symptomatic bradycardia. Bp's still running high per MD. Will continue to monitor.  · Appetite and intake good. Per charted intake-typically eats % of meals. Did not eat much yesterday, but did drink ensure. Ate 100% of breakfast this morning and drank ensure.  Reason for Assessment  Reason For Assessment: length of stay  Diagnosis: (symptomatic bradycardia)  Relevant Medical History: dementia, GERD, HTN  Interdisciplinary Rounds: did not attend    Nutrition Risk Screen  Nutrition Risk Screen: no indicators present    Nutrition/Diet History  Food Allergies: NKFA  Factors Affecting Nutritional Intake: impaired cognitive status/motor control    Anthropometrics  Temp: 97.7 °F (36.5 °C)  Height Method: Stated  Height: 5' 9" (175.3 cm)  Height (inches): 69 in  Weight Method: Standard Scale  Weight: 86.2 kg (190 lb 0.6 oz)  Weight (lb): 190.04 lb  Ideal Body Weight (IBW), Male: 160 lb  % Ideal Body Weight, Male (lb): 118.78 %  BMI (Calculated): 28.1  BMI Grade: 25 - 29.9 - overweight     Weight History:  Wt Readings from Last 10 Encounters:   07/09/20 86.2 kg (190 lb 0.6 oz)   10/11/16 113.4 kg (250 lb)   10/05/16 111.1 kg (245 lb)   09/22/16 112.1 kg (247 lb 2.2 oz)     Lab/Procedures/Meds: Pertinent Labs Reviewed  Clinical Chemistry:  Recent Labs   Lab 07/09/20  1733 " 07/12/20  0303 07/13/20  0500    140 138   K 3.7 3.7 3.7    106 103   CO2 24 26 27   * 98 92   BUN 26* 25* 31*   CREATININE 1.5* 1.5* 1.1   CALCIUM 8.3* 8.3* 8.2*   PROT 5.6* 5.5*  --    ALBUMIN 3.0* 3.0*  --    BILITOT 0.6 0.4  --    ALKPHOS 47* 47*  --    AST 17 16  --    ALT 10 12  --    ANIONGAP 10 8 8   ESTGFRAFRICA 49.8* 49.8* >60.0   EGFRNONAA 43.0* 43.0* >60.0   MG 2.0  --   --     < > = values in this interval not displayed.     CBC:   Recent Labs   Lab 07/12/20  0303   WBC 4.86   RBC 3.33*   HGB 11.3*   HCT 33.6*      *   MCH 33.9*   MCHC 33.6     Cardiac Profile:  Recent Labs   Lab 07/09/20  1733 07/09/20  2354 07/10/20  0532   *  --   --    TROPONINI <0.030 <0.030 <0.030     Thyroid & Parathyroid:  Recent Labs   Lab 07/09/20  2354   TSH 2.350     Medications: Pertinent Medications reviewed  Scheduled Meds:   amLODIPine  5 mg Oral BID    atorvastatin  10 mg Oral QHS    isosorbide dinitrate  20 mg Oral TID    pantoprazole  40 mg Oral Daily     Continuous Infusions:  PRN Meds:.acetaminophen, lorazepam, melatonin, sodium chloride 0.9%, traMADoL, ziprasidone    Estimated/Assessed Needs    Weight Used For Calorie Calculations: 86.2 kg (190 lb 0.6 oz)  Energy Calorie Requirements (kcal): 9396-0456 kcals/day (2530 kcals/kg)  Energy Need Method: Kcal/kg  Protein Requirements:  g/day (1.0-1.3 g/kg)  Weight Used For Protein Calculations: 86.2 kg (190 lb 0.6 oz)     Estimated Fluid Requirement Method: RDA Method    Nutrition Prescription Ordered    Current Diet Order: Cardiac Diet    Evaluation of Received Nutrient/Fluid Intake    Energy Calories Required: not meeting needs  Protein Required: not meeting needs  Fluid Required: meeting needs  Tolerance: tolerating  % Intake of Estimated Energy Needs: 75 - 100 %  % Meal Intake: 75 - 100 %    Intake/Output Summary (Last 24 hours) at 7/13/2020 1057  Last data filed at 7/13/2020 0800  Gross per 24 hour   Intake 1080 ml    Output --   Net 1080 ml      Nutrition Risk    Level of Risk/Frequency of Follow-up: moderate - high   Monitor and Evaluation    Food and Nutrient Intake: energy intake, food and beverage intake  Food and Nutrient Adminstration: diet order  Physical Activity and Function: factors affecting access to physical activity, nutrition-related ADLs and IADLs  Anthropometric Measurements: weight, weight change, body mass index  Biochemical Data, Medical Tests and Procedures: electrolyte and renal panel, glucose/endocrine profile, lipid profile, inflammatory profile, gastrointestinal profile  Nutrition-Focused Physical Findings: overall appearance     Nutrition Follow-Up    RD Follow-up?: Yes  Abi Snow RD 07/13/2020 11:02 AM

## 2020-07-14 LAB
ANION GAP SERPL CALC-SCNC: 11 MMOL/L (ref 8–16)
BASOPHILS # BLD AUTO: 0.01 K/UL (ref 0–0.2)
BASOPHILS NFR BLD: 0.3 % (ref 0–1.9)
BUN SERPL-MCNC: 33 MG/DL (ref 8–23)
CALCIUM SERPL-MCNC: 8.5 MG/DL (ref 8.7–10.5)
CHLORIDE SERPL-SCNC: 100 MMOL/L (ref 95–110)
CO2 SERPL-SCNC: 25 MMOL/L (ref 23–29)
CREAT SERPL-MCNC: 1.1 MG/DL (ref 0.5–1.4)
DIFFERENTIAL METHOD: ABNORMAL
EOSINOPHIL # BLD AUTO: 0.1 K/UL (ref 0–0.5)
EOSINOPHIL NFR BLD: 2.6 % (ref 0–8)
ERYTHROCYTE [DISTWIDTH] IN BLOOD BY AUTOMATED COUNT: 14 % (ref 11.5–14.5)
EST. GFR  (AFRICAN AMERICAN): >60 ML/MIN/1.73 M^2
EST. GFR  (NON AFRICAN AMERICAN): >60 ML/MIN/1.73 M^2
GLUCOSE SERPL-MCNC: 92 MG/DL (ref 70–110)
HCT VFR BLD AUTO: 31.9 % (ref 40–54)
HGB BLD-MCNC: 10.5 G/DL (ref 14–18)
IMM GRANULOCYTES # BLD AUTO: 0.01 K/UL (ref 0–0.04)
IMM GRANULOCYTES NFR BLD AUTO: 0.3 % (ref 0–0.5)
LYMPHOCYTES # BLD AUTO: 1.3 K/UL (ref 1–4.8)
LYMPHOCYTES NFR BLD: 33.4 % (ref 18–48)
MAGNESIUM SERPL-MCNC: 1.9 MG/DL (ref 1.6–2.6)
MCH RBC QN AUTO: 33.4 PG (ref 27–31)
MCHC RBC AUTO-ENTMCNC: 32.9 G/DL (ref 32–36)
MCV RBC AUTO: 102 FL (ref 82–98)
MONOCYTES # BLD AUTO: 0.2 K/UL (ref 0.3–1)
MONOCYTES NFR BLD: 5.4 % (ref 4–15)
NEUTROPHILS # BLD AUTO: 2.2 K/UL (ref 1.8–7.7)
NEUTROPHILS NFR BLD: 58 % (ref 38–73)
NRBC BLD-RTO: 0 /100 WBC
PLATELET # BLD AUTO: 182 K/UL (ref 150–350)
PMV BLD AUTO: 10.7 FL (ref 9.2–12.9)
POTASSIUM SERPL-SCNC: 4.1 MMOL/L (ref 3.5–5.1)
RBC # BLD AUTO: 3.14 M/UL (ref 4.6–6.2)
SODIUM SERPL-SCNC: 136 MMOL/L (ref 136–145)
WBC # BLD AUTO: 3.86 K/UL (ref 3.9–12.7)

## 2020-07-14 PROCEDURE — 25000003 PHARM REV CODE 250: Performed by: NURSE PRACTITIONER

## 2020-07-14 PROCEDURE — 80048 BASIC METABOLIC PNL TOTAL CA: CPT

## 2020-07-14 PROCEDURE — 25000003 PHARM REV CODE 250: Performed by: INTERNAL MEDICINE

## 2020-07-14 PROCEDURE — 36415 COLL VENOUS BLD VENIPUNCTURE: CPT

## 2020-07-14 PROCEDURE — 83735 ASSAY OF MAGNESIUM: CPT

## 2020-07-14 PROCEDURE — 63600175 PHARM REV CODE 636 W HCPCS: Performed by: HOSPITALIST

## 2020-07-14 PROCEDURE — 30200315 PPD INTRADERMAL TEST REV CODE 302: Performed by: HOSPITALIST

## 2020-07-14 PROCEDURE — 12000002 HC ACUTE/MED SURGE SEMI-PRIVATE ROOM

## 2020-07-14 PROCEDURE — 86580 TB INTRADERMAL TEST: CPT | Performed by: HOSPITALIST

## 2020-07-14 PROCEDURE — 85025 COMPLETE CBC W/AUTO DIFF WBC: CPT

## 2020-07-14 RX ORDER — HALOPERIDOL 5 MG/ML
2 INJECTION INTRAMUSCULAR EVERY 6 HOURS PRN
Status: DISCONTINUED | OUTPATIENT
Start: 2020-07-14 | End: 2020-07-17 | Stop reason: HOSPADM

## 2020-07-14 RX ADMIN — ATORVASTATIN CALCIUM 10 MG: 10 TABLET, FILM COATED ORAL at 09:07

## 2020-07-14 RX ADMIN — AMLODIPINE BESYLATE 5 MG: 5 TABLET ORAL at 09:07

## 2020-07-14 RX ADMIN — LORAZEPAM 1 MG: 2 INJECTION INTRAMUSCULAR; INTRAVENOUS at 09:07

## 2020-07-14 RX ADMIN — ISOSORBIDE MONONITRATE 60 MG: 60 TABLET, EXTENDED RELEASE ORAL at 09:07

## 2020-07-14 RX ADMIN — PANTOPRAZOLE SODIUM 40 MG: 40 TABLET, DELAYED RELEASE ORAL at 09:07

## 2020-07-14 RX ADMIN — TUBERCULIN PURIFIED PROTEIN DERIVATIVE 5 UNITS: 5 INJECTION, SOLUTION INTRADERMAL at 03:07

## 2020-07-14 NOTE — PROGRESS NOTES
[facility] Medicine  Progress Note    Patient Name: Tomasz Chavez  MRN: [id number]  Patient Class: IP- Inpatient   Admission Date: 7/9/2020  Length of Stay: 5 days  Attending Physician: Yimi Warren DO  Primary Care Provider: Kendrick Hatfield MD        Subjective:     Principal Problem:Symptomatic bradycardia        HPI:  No notes on file    Overview/Hospital Course:  No notes on file    Interval History:  Patient with history of dementia will answer some questions with short answers occasionally    Review of Systems  Objective:     Vital Signs (Most Recent):  Temp: 98.2 °F (36.8 °C) (07/14/20 0355)  Pulse: 94 (07/14/20 0355)  Resp: 16 (07/14/20 0355)  BP: 135/72 (07/14/20 0911)  SpO2: 100 % (07/14/20 0355) Vital Signs (24h Range):  Temp:  [97.7 °F (36.5 °C)-98.9 °F (37.2 °C)] 98.2 °F (36.8 °C)  Pulse:  [] 94  Resp:  [15-20] 16  SpO2:  [97 %-100 %] 100 %  BP: (129-176)/(72-92) 135/72     Weight: 82.4 kg (181 lb 10.5 oz)  Body mass index is 26.83 kg/m².    Intake/Output Summary (Last 24 hours) at 7/14/2020 1052  Last data filed at 7/14/2020 0600  Gross per 24 hour   Intake 240 ml   Output --   Net 240 ml      Physical Exam patient appears in no distress  HEENT sclerae nonicteric  Neck is supple nontender  Lungs are clear  Heart is regular rate rhythm  Abdomen is soft nontender  Extremities no edema  Neuro patient is alert moves all extremities equally   exam no Chun    Significant Labs:   BMP:   Recent Labs   Lab 07/14/20  0533   GLU 92      K 4.1      CO2 25   BUN 33*   CREATININE 1.1   CALCIUM 8.5*   MG 1.9     CBC:   Recent Labs   Lab 07/14/20  0533   WBC 3.86*   HGB 10.5*   HCT 31.9*        CMP:   Recent Labs   Lab 07/13/20  0500 07/14/20  0533    136   K 3.7 4.1    100   CO2 27 25   GLU 92 92   BUN 31* 33*   CREATININE 1.1 1.1   CALCIUM 8.2* 8.5*   ANIONGAP 8 11   EGFRNONAA >60.0 >60.0       Significant Imaging:     Assessment /plan  #1  Possible Syncope-etiology not identified  #2 bradycardia-resolved.  D/C'd Toprol  #3 Acute delirium with underlying dementia-now probably baseline  #4 CKD 3-stable  #5 Essential HTN-appears controlled  #6 Normocytic anemia-most likely chronic disease  #7 DNR    Patient calm today  Answering some questions  Will discontinue benzo which may be contributing to confusion  Waiting for placement    VTE Risk Mitigation (From admission, onward)         Ordered     IP VTE LOW RISK PATIENT  Once      07/09/20 2043     Place sequential compression device  Until discontinued      07/09/20 2043                      Yimi Warren DO  Department of Hospital Medicine   [facility]

## 2020-07-14 NOTE — PLAN OF CARE
07/14/20 1332   Post-Acute Status   Post-Acute Authorization Placement   Post-Acute Placement Status Referrals Sent   Discharge Plan   Discharge Plan A Skilled Nursing Facility   Discharge Plan B Skilled Nursing Facility     [facility] not taking patients at this time, cm reached out to son and informed him cm sent referals [facility], [facility] and [facility] trace.   Cm will follow

## 2020-07-14 NOTE — CARE UPDATE
07/13/20 2030   Patient Assessment/Suction   Level of Consciousness (AVPU) alert   Respiratory Effort Unlabored   Expansion/Accessory Muscles/Retractions no use of accessory muscles   All Lung Fields Breath Sounds clear   Rhythm/Pattern, Respiratory no shortness of breath reported   Cough Frequency no cough   PRE-TX-O2   O2 Device (Oxygen Therapy) room air   SpO2 98 %   Pulse Oximetry Type Continuous   Pulse 105   Resp 15   Positioning   Head of Bed (HOB) HOB elevated   Respiratory Evaluation   $ Care Plan Tech Time 15 min

## 2020-07-14 NOTE — NURSING TRANSFER
Nursing Transfer Note      7/14/2020     Transfer from 2535 to 1105    Transfer via wheelchair    Transfer with tele box     Transported by Katelynn Gómez Student    Medicines sent: no    Chart send with patient: yes    Notified: mariusz Scott [phone] at 1110    Patient reassessed at: by Sara OROPEZA    Upon arrival to floor: CNA and I got patient into bed and CNA did vitals.

## 2020-07-14 NOTE — SUBJECTIVE & OBJECTIVE
Interval History:  Patient with history of dementia will answer some questions with short answers occasionally    Review of Systems  Objective:     Vital Signs (Most Recent):  Temp: 98.2 °F (36.8 °C) (07/14/20 0355)  Pulse: 94 (07/14/20 0355)  Resp: 16 (07/14/20 0355)  BP: 135/72 (07/14/20 0911)  SpO2: 100 % (07/14/20 0355) Vital Signs (24h Range):  Temp:  [97.7 °F (36.5 °C)-98.9 °F (37.2 °C)] 98.2 °F (36.8 °C)  Pulse:  [] 94  Resp:  [15-20] 16  SpO2:  [97 %-100 %] 100 %  BP: (129-176)/(72-92) 135/72     Weight: 82.4 kg (181 lb 10.5 oz)  Body mass index is 26.83 kg/m².    Intake/Output Summary (Last 24 hours) at 7/14/2020 1052  Last data filed at 7/14/2020 0600  Gross per 24 hour   Intake 240 ml   Output --   Net 240 ml      Physical Exam patient appears in no distress  HEENT sclerae nonicteric  Neck is supple nontender  Lungs are clear  Heart is regular rate rhythm  Abdomen is soft nontender  Extremities no edema  Neuro patient is alert moves all extremities equally   exam no Chun    Significant Labs:   BMP:   Recent Labs   Lab 07/14/20  0533   GLU 92      K 4.1      CO2 25   BUN 33*   CREATININE 1.1   CALCIUM 8.5*   MG 1.9     CBC:   Recent Labs   Lab 07/14/20  0533   WBC 3.86*   HGB 10.5*   HCT 31.9*        CMP:   Recent Labs   Lab 07/13/20  0500 07/14/20  0533    136   K 3.7 4.1    100   CO2 27 25   GLU 92 92   BUN 31* 33*   CREATININE 1.1 1.1   CALCIUM 8.2* 8.5*   ANIONGAP 8 11   EGFRNONAA >60.0 >60.0       Significant Imaging:

## 2020-07-14 NOTE — NURSING
Sitter reassigned to another patient. Bed alarm on zone 2. Side rails up x 3. Bedside table within reach. Urinal within reach. Door open. Frequent checks for patient safety.

## 2020-07-14 NOTE — PLAN OF CARE
Problem: Fall Injury Risk  Goal: Absence of Fall and Fall-Related Injury  Outcome: Ongoing, Progressing     Problem: Adult Inpatient Plan of Care  Goal: Plan of Care Review  Outcome: Ongoing, Progressing     Problem: Adult Inpatient Plan of Care  Goal: Patient-Specific Goal (Individualization)  Outcome: Ongoing, Progressing     Problem: Adult Inpatient Plan of Care  Goal: Absence of Hospital-Acquired Illness or Injury  Outcome: Ongoing, Progressing     Problem: Skin Injury Risk Increased  Goal: Skin Health and Integrity  Outcome: Ongoing, Progressing

## 2020-07-15 PROCEDURE — 97535 SELF CARE MNGMENT TRAINING: CPT

## 2020-07-15 PROCEDURE — 97116 GAIT TRAINING THERAPY: CPT

## 2020-07-15 PROCEDURE — 12000002 HC ACUTE/MED SURGE SEMI-PRIVATE ROOM

## 2020-07-15 PROCEDURE — 97162 PT EVAL MOD COMPLEX 30 MIN: CPT

## 2020-07-15 PROCEDURE — 97165 OT EVAL LOW COMPLEX 30 MIN: CPT

## 2020-07-15 PROCEDURE — 63600175 PHARM REV CODE 636 W HCPCS: Performed by: HOSPITALIST

## 2020-07-15 PROCEDURE — 25000003 PHARM REV CODE 250: Performed by: NURSE PRACTITIONER

## 2020-07-15 PROCEDURE — 25000003 PHARM REV CODE 250: Performed by: INTERNAL MEDICINE

## 2020-07-15 PROCEDURE — 97530 THERAPEUTIC ACTIVITIES: CPT

## 2020-07-15 RX ADMIN — LORAZEPAM 1 MG: 2 INJECTION INTRAMUSCULAR; INTRAVENOUS at 03:07

## 2020-07-15 RX ADMIN — AMLODIPINE BESYLATE 5 MG: 5 TABLET ORAL at 08:07

## 2020-07-15 RX ADMIN — ISOSORBIDE MONONITRATE 60 MG: 60 TABLET, EXTENDED RELEASE ORAL at 08:07

## 2020-07-15 RX ADMIN — PANTOPRAZOLE SODIUM 40 MG: 40 TABLET, DELAYED RELEASE ORAL at 08:07

## 2020-07-15 RX ADMIN — ATORVASTATIN CALCIUM 10 MG: 10 TABLET, FILM COATED ORAL at 08:07

## 2020-07-15 RX ADMIN — MELATONIN 6 MG: at 08:07

## 2020-07-15 NOTE — PT/OT/SLP EVAL
"Physical Therapy Evaluation    Patient Name:  Tomasz Chavez   MRN:  [id number]    Recommendations:     Discharge Recommendations:  (SNF vs 24/7 care)   Discharge Equipment Recommendations: (tbd)   Barriers to discharge: Pt is a fall risk and needs 24/7 supervision with close contact at this time. No family present to discuss.    Assessment:     Tomasz Chavez is a 79 y.o. male admitted with a medical diagnosis of Symptomatic bradycardia.  He presents with the following impairments/functional limitations:  impaired endurance, impaired self care skills, impaired functional mobilty, gait instability, impaired balance, impaired cognition, decreased safety awareness . Pt resting supine, agrees to PT. Sitter present. Pt is [place] so sometimes does not understand question when he repeats what PT is asking. Pt is only oriented to name, unable to tell me where he is even after reorienting pt and asking again a few minutes later. Pt doesn't know what he likes to do during the day; says he lives alone but then later states he lives with his wife. He unable to state her name but then remembers that "Ma" cooks for him and that's his wife and her name is Leslie. Pt is unable to tell me who lives next door or if his son's name is Tyler. Pt can follow all instructions and has good coordination. During ambulation, pt urinated on the floor without warning. No family present to discuss poc.     Rehab Prognosis: Fair; patient would benefit from acute skilled PT services to address these deficits and reach maximum level of function.    Recent Surgery: * No surgery found *      Plan:     During this hospitalization, patient to be seen 5 x/week to address the identified rehab impairments via gait training, therapeutic activities, therapeutic exercises and progress toward the following goals:    · Plan of Care Expires:  08/14/20    Subjective     Chief Complaint: none  Patient/Family Comments/goals: none  Pain/Comfort:  · Pain Rating 1: " 0/10    Patients cultural, spiritual, Restorationism conflicts given the current situation:      Living Environment:  From chart review, pt lives alone next door to sonTyler.   Prior to admission, patients level of function was independent w/o assistive device.  Equipment used at home: none.  DME owned (not currently used): unknown.  Upon discharge, patient will have assistance from family.    Objective:     Communicated with rn prior to session.  Patient found supine with telemetry, bed alarm(sitter)  upon PT entry to room.    General Precautions: Standard, fall   Orthopedic Precautions:N/A   Braces: N/A     Exams:  · Cognitive Exam:  Patient is oriented to Person  · Gross Motor Coordination:  WFL  · RUE ROM: WFL except shoulder flexion to about 90 degrees  · LUE ROM: WFL except shoulder flexion to about 80 degrees.   · RLE ROM: WFL  · RLE Strength: WFL  · LLE ROM: WFL  · LLE Strength: WFL formal MMT not performed    Functional Mobility:  · Bed Mobility:     · Rolling Left:  supervision  · Supine to Sit: supervision  · Transfers:     · Sit to Stand:  contact guard assistance with hand-held assist  · Gait: pt able to ambulate 100 ft no assistive device, gait belt intact; occasional off balance by crossing left over right leg and noted off balance and needed assistance with gait belt to keep from falling.  Pt denies dizziness.     Therapeutic Activities and Exercises:   education, fall prevention (bed and chair alarm), poc, dc planning.     AM-PAC 6 CLICK MOBILITY  Total Score:20     Patient left sitting EOB with sitter and OT present with sitter and OT present.    GOALS:   Multidisciplinary Problems     Physical Therapy Goals        Problem: Physical Therapy Goal    Goal Priority Disciplines Outcome Goal Variances Interventions   Physical Therapy Goal     PT, PT/OT      Description: Goals to be met by: discharge     Patient will increase functional independence with mobility by performin. Supine to sit with  Stand-by Assistance  2. Sit to stand transfer with Supervision  3. Bed to chair transfer with Supervision using Rolling Walker or no assistive device  4. Gait  x 200 feet with Supervision using Rolling Walker or no assistive device.                      History:     Past Medical History:   Diagnosis Date    Dementia     GERD (gastroesophageal reflux disease)     Hypertension     Sleep apnea     does not use machine    Wears glasses        Past Surgical History:   Procedure Laterality Date    APPENDECTOMY      CHOLECYSTECTOMY      FINGER SURGERY      laceration    HERNIA REPAIR         Time Tracking:     PT Received On: 07/15/20  PT Start Time: 1024     PT Stop Time: 1053  PT Total Time (min): 29 min     Billable Minutes: Evaluation 6, Gait Training 12 and Therapeutic Activity 11      Audrey Alvarez, PT  07/15/2020

## 2020-07-15 NOTE — PROGRESS NOTES
[facility] Medicine  Progress Note    Patient Name: Tomasz Chavez  MRN: [id number]  Patient Class: IP- Inpatient   Admission Date: 7/9/2020  Length of Stay: 6 days  Attending Physician: Yimi Warren DO  Primary Care Provider: Kendrick Hatfield MD        Subjective:     Principal Problem:Symptomatic bradycardia        HPI:  No notes on file    Overview/Hospital Course:  No notes on file    Interval History:  No acute events last 24 hr  Patient with dementia for historian.  Unable to give full history  He denies chest pain or shortness of breath      Objective:     Vital Signs (Most Recent):  Temp: 98.4 °F (36.9 °C) (07/15/20 1100)  Pulse: 80 (07/15/20 1100)  Resp: 18 (07/15/20 1100)  BP: (!) 148/86 (07/15/20 1100)  SpO2: 96 % (07/15/20 1100) Vital Signs (24h Range):  Temp:  [97.6 °F (36.4 °C)-98.8 °F (37.1 °C)] 98.4 °F (36.9 °C)  Pulse:  [72-80] 80  Resp:  [16-18] 18  SpO2:  [95 %-98 %] 96 %  BP: (123-181)/(72-86) 148/86     Weight: 82.4 kg (181 lb 10.5 oz)  Body mass index is 26.83 kg/m².  No intake or output data in the 24 hours ending 07/15/20 1347   Physical Exam patient lying in bed in no distress  Lungs are clear  Heart regular rate   Neuro patient is alert he will answer some simple questions occasionally    Significant Labs:   BMP:   Recent Labs   Lab 07/14/20  0533   GLU 92      K 4.1      CO2 25   BUN 33*   CREATININE 1.1   CALCIUM 8.5*   MG 1.9     CBC:   Recent Labs   Lab 07/14/20  0533   WBC 3.86*   HGB 10.5*   HCT 31.9*        CMP:   Recent Labs   Lab 07/14/20  0533      K 4.1      CO2 25   GLU 92   BUN 33*   CREATININE 1.1   CALCIUM 8.5*   ANIONGAP 11   EGFRNONAA >60.0       Significant Imaging:       Assessment/Plan:   #1 Possible Syncope-etiology not identified  #2 bradycardia-resolved.  D/C'd Toprol  #3 Acute delirium with underlying dementia-now probably baseline  #4 CKD 3-stable  #5 Essential HTN-appears controlled  #6 Normocytic  anemia-most likely chronic disease  #7 DNR     Patient calm again  today  Waiting for placement  Spoke with case management     VTE Risk Mitigation (From admission, onward)         Ordered     IP VTE LOW RISK PATIENT  Once      07/09/20 2043     Place sequential compression device  Until discontinued      07/09/20 2043                      Yimi Warren DO  Department of Hospital Medicine   [facility]

## 2020-07-15 NOTE — SUBJECTIVE & OBJECTIVE
Interval History:  No acute events last 24 hr  Patient with dementia for historian.  Unable to give full history  He denies chest pain or shortness of breath      Objective:     Vital Signs (Most Recent):  Temp: 98.4 °F (36.9 °C) (07/15/20 1100)  Pulse: 80 (07/15/20 1100)  Resp: 18 (07/15/20 1100)  BP: (!) 148/86 (07/15/20 1100)  SpO2: 96 % (07/15/20 1100) Vital Signs (24h Range):  Temp:  [97.6 °F (36.4 °C)-98.8 °F (37.1 °C)] 98.4 °F (36.9 °C)  Pulse:  [72-80] 80  Resp:  [16-18] 18  SpO2:  [95 %-98 %] 96 %  BP: (123-181)/(72-86) 148/86     Weight: 82.4 kg (181 lb 10.5 oz)  Body mass index is 26.83 kg/m².  No intake or output data in the 24 hours ending 07/15/20 1347   Physical Exam patient lying in bed in no distress  Lungs are clear  Heart regular rate   Neuro patient is alert he will answer some simple questions occasionally    Significant Labs:   BMP:   Recent Labs   Lab 07/14/20  0533   GLU 92      K 4.1      CO2 25   BUN 33*   CREATININE 1.1   CALCIUM 8.5*   MG 1.9     CBC:   Recent Labs   Lab 07/14/20  0533   WBC 3.86*   HGB 10.5*   HCT 31.9*        CMP:   Recent Labs   Lab 07/14/20  0533      K 4.1      CO2 25   GLU 92   BUN 33*   CREATININE 1.1   CALCIUM 8.5*   ANIONGAP 11   EGFRNONAA >60.0       Significant Imaging:

## 2020-07-16 LAB
GLUCOSE SERPL-MCNC: 144 MG/DL (ref 70–110)
GLUCOSE SERPL-MCNC: 154 MG/DL (ref 70–110)
TB INDURATION 48 - 72 HR READ: 0 MM

## 2020-07-16 PROCEDURE — 97116 GAIT TRAINING THERAPY: CPT | Mod: CQ

## 2020-07-16 PROCEDURE — 25000003 PHARM REV CODE 250: Performed by: INTERNAL MEDICINE

## 2020-07-16 PROCEDURE — 25000003 PHARM REV CODE 250: Performed by: NURSE PRACTITIONER

## 2020-07-16 PROCEDURE — 97535 SELF CARE MNGMENT TRAINING: CPT

## 2020-07-16 PROCEDURE — 63600175 PHARM REV CODE 636 W HCPCS: Performed by: HOSPITALIST

## 2020-07-16 PROCEDURE — 97530 THERAPEUTIC ACTIVITIES: CPT | Mod: CQ

## 2020-07-16 PROCEDURE — 63600175 PHARM REV CODE 636 W HCPCS: Performed by: INTERNAL MEDICINE

## 2020-07-16 PROCEDURE — 12000002 HC ACUTE/MED SURGE SEMI-PRIVATE ROOM

## 2020-07-16 RX ADMIN — ISOSORBIDE MONONITRATE 60 MG: 60 TABLET, EXTENDED RELEASE ORAL at 09:07

## 2020-07-16 RX ADMIN — AMLODIPINE BESYLATE 5 MG: 5 TABLET ORAL at 09:07

## 2020-07-16 RX ADMIN — ATORVASTATIN CALCIUM 10 MG: 10 TABLET, FILM COATED ORAL at 08:07

## 2020-07-16 RX ADMIN — MELATONIN 6 MG: at 08:07

## 2020-07-16 RX ADMIN — TRAMADOL HYDROCHLORIDE 50 MG: 50 TABLET, FILM COATED ORAL at 08:07

## 2020-07-16 RX ADMIN — PANTOPRAZOLE SODIUM 40 MG: 40 TABLET, DELAYED RELEASE ORAL at 09:07

## 2020-07-16 RX ADMIN — LORAZEPAM 1 MG: 2 INJECTION INTRAMUSCULAR; INTRAVENOUS at 06:07

## 2020-07-16 RX ADMIN — AMLODIPINE BESYLATE 5 MG: 5 TABLET ORAL at 08:07

## 2020-07-16 RX ADMIN — ZIPRASIDONE MESYLATE 20 MG: 20 INJECTION, POWDER, LYOPHILIZED, FOR SOLUTION INTRAMUSCULAR at 05:07

## 2020-07-16 NOTE — PLAN OF CARE
07/16/20 1124   Post-Acute Status   Post-Acute Authorization Placement   Post-Acute Placement Status Patient Evaluation by Facility   Talked to Xuan at [facility] and still reviewing it. Called [facility] to talk to Xuan and no answer. Waiting on Lena from [facility] to call back with a decision.

## 2020-07-16 NOTE — CARE UPDATE
Xuan from [facility] calloed and asked for more clinical data, cm built document and sent to [employer].  Ja to follow.

## 2020-07-16 NOTE — PT/OT/SLP PROGRESS
"Physical Therapy Treatment    Patient Name:  Tomasz Chavez   MRN:  [id number]    Recommendations:     Discharge Recommendations:  (SNF vs 24/7 care)   Discharge Equipment Recommendations: (tbd)   Barriers to discharge: Pt requires assistance with all mobility    Assessment:     Tomasz Chavez is a 79 y.o. male admitted with a medical diagnosis of Symptomatic bradycardia.  He presents with the following impairments/functional limitations:  weakness, impaired endurance, impaired self care skills, impaired functional mobilty, impaired balance, gait instability, decreased safety awareness, pain . Pt completed gait training without noting LOB or SOB. Pt reported increased abdominal pain with all movement. Pt declined further gait training 2/2 abdominal pain. Pt requires assistance with all OOB mobility for steadying and due to decreased safety awareness.  Continue with PT and POC.     Rehab Prognosis: Fair; patient would benefit from acute skilled PT services to address these deficits and reach maximum level of function.    Recent Surgery: * No surgery found *      Plan:     During this hospitalization, patient to be seen 5 x/week to address the identified rehab impairments via gait training, therapeutic activities, therapeutic exercises and progress toward the following goals:    · Plan of Care Expires:  08/14/20    Subjective     Chief Complaint: Pt reports " I don't feel right today."  Patient/Family Comments/goals: decreased pain with movement   Pain/Comfort:  · Pain Rating 1: (Pt did not quantify pain)  · Location 1: abdomen      Objective:     Communicated with RN prior to session.  Patient found supine with telemetry upon PT entry to room.     General Precautions: Standard, fall   Orthopedic Precautions:N/A   Braces:       Functional Mobility:  · Bed Mobility:     · Supine to Sit: minimum assistance  · Sit to Supine: contact guard assistance  · Transfers:     · Sit to Stand:  contact guard assistance with " rolling walker  · Gait: 20 feet without the use of an AD and CGA/min A      AM-PAC 6 CLICK MOBILITY          Therapeutic Activities and Exercises:   bed mobility; sitting EOB for trunk control and midline orientation; sit<> stands; transfer training; gait training     Patient left HOB elevated with all lines intact, call button in reach and bed alarm on..    GOALS:   Multidisciplinary Problems     Physical Therapy Goals        Problem: Physical Therapy Goal    Goal Priority Disciplines Outcome Goal Variances Interventions   Physical Therapy Goal     PT, PT/OT Ongoing, Progressing     Description: Goals to be met by: discharge     Patient will increase functional independence with mobility by performin. Supine to sit with Stand-by Assistance  2. Sit to stand transfer with Supervision  3. Bed to chair transfer with Supervision using Rolling Walker or no assistive device  4. Gait  x 200 feet with Supervision using Rolling Walker or no assistive device.                      Time Tracking:     PT Received On: 20  PT Start Time: 1034     PT Stop Time: 1057  PT Total Time (min): 23 min     Billable Minutes: Gait Training 8 and Therapeutic Activity 15    Treatment Type: Treatment  PT/PTA: PTA     PTA Visit Number: 1     Hillary Hammant, PTA  2020

## 2020-07-16 NOTE — PROGRESS NOTES
[facility] Medicine  Progress Note    Patient Name: Tomasz Chavez  MRN: [id number]  Patient Class: IP- Inpatient   Admission Date: 7/9/2020  Length of Stay: 7 days  Attending Physician: Yimi Warren DO  Primary Care Provider: Kendrick Hatfield MD        Subjective:     Principal Problem:Symptomatic bradycardia        HPI:  No notes on file    Overview/Hospital Course:  No notes on file    Interval History:  No acute events  Still waiting for placement    Review of Systems  Objective:     Vital Signs (Most Recent):  Temp: 98.2 °F (36.8 °C) (07/16/20 1142)  Pulse: 62 (07/16/20 1142)  Resp: 16 (07/16/20 1142)  BP: (!) 86/53 (07/16/20 1142)  SpO2: 99 % (07/16/20 1142) Vital Signs (24h Range):  Temp:  [98 °F (36.7 °C)-98.6 °F (37 °C)] 98.2 °F (36.8 °C)  Pulse:  [62-90] 62  Resp:  [16-19] 16  SpO2:  [97 %-99 %] 99 %  BP: ()/(53-81) 86/53     Weight: 82.4 kg (181 lb 10.5 oz)  Body mass index is 26.83 kg/m².    Intake/Output Summary (Last 24 hours) at 7/16/2020 1450  Last data filed at 7/16/2020 0600  Gross per 24 hour   Intake 480 ml   Output --   Net 480 ml      Physical Exam patient sitting in bed eating ice cream  No distress watching television              Assessment/Plan:    #1 Possible Syncope-etiology not identified  #2 bradycardia-resolved.  D/C'd Toprol  #3 Acute delirium with underlying dementia-now  baseline  #4 CKD 3-stable  #5 Essential HTN-appears controlled  #6 Normocytic anemia-most likely chronic disease  #7 DNR    Patient remains calm  Case discussed with case management  Waiting on placement    VTE Risk Mitigation (From admission, onward)         Ordered     IP VTE LOW RISK PATIENT  Once      07/09/20 2043     Place sequential compression device  Until discontinued      07/09/20 2043                      Yimi Warren DO  Department of Hospital Medicine   [facility]

## 2020-07-16 NOTE — SUBJECTIVE & OBJECTIVE
Interval History:  No acute events  Still waiting for placement    Review of Systems  Objective:     Vital Signs (Most Recent):  Temp: 98.2 °F (36.8 °C) (07/16/20 1142)  Pulse: 62 (07/16/20 1142)  Resp: 16 (07/16/20 1142)  BP: (!) 86/53 (07/16/20 1142)  SpO2: 99 % (07/16/20 1142) Vital Signs (24h Range):  Temp:  [98 °F (36.7 °C)-98.6 °F (37 °C)] 98.2 °F (36.8 °C)  Pulse:  [62-90] 62  Resp:  [16-19] 16  SpO2:  [97 %-99 %] 99 %  BP: ()/(53-81) 86/53     Weight: 82.4 kg (181 lb 10.5 oz)  Body mass index is 26.83 kg/m².    Intake/Output Summary (Last 24 hours) at 7/16/2020 1450  Last data filed at 7/16/2020 0600  Gross per 24 hour   Intake 480 ml   Output --   Net 480 ml      Physical Exam patient sitting in bed eating ice cream  No distress watching television

## 2020-07-16 NOTE — PLAN OF CARE
Problem: Physical Therapy Goal  Goal: Physical Therapy Goal  Description: Goals to be met by: discharge     Patient will increase functional independence with mobility by performin. Supine to sit with Stand-by Assistance  2. Sit to stand transfer with Supervision  3. Bed to chair transfer with Supervision using Rolling Walker or no assistive device  4. Gait  x 200 feet with Supervision using Rolling Walker or no assistive device.     Outcome: Ongoing, Progressing   Pt progressing toward meeting goals

## 2020-07-16 NOTE — PT/OT/SLP PROGRESS
Occupational Therapy   Treatment    Name: Tomasz Chavez  MRN: [id number]  Admitting Diagnosis:  Symptomatic bradycardia       Recommendations:     Discharge Recommendations: nursing facility, skilled  Discharge Equipment Recommendations:  (TBD next level of care)  Barriers to discharge:  Decreased caregiver support    Assessment:     Tomasz Chavez is a 79 y.o. male with a medical diagnosis of Symptomatic bradycardia.  He presents with improving medical acuity and functional mobility. Requires supervision secondary to poor safety awareness from dementia. Patient participated in sitting/standing ADL activity in the hospital room and bathroom this session. Performance deficits affecting function are weakness, impaired endurance, impaired self care skills, impaired functional mobilty, gait instability, impaired balance.     Rehab Prognosis:  Fair; patient would benefit from acute skilled OT services to address these deficits and reach maximum level of function.       Plan:     Patient to be seen 5 x/week to address the above listed problems via self-care/home management, therapeutic activities, therapeutic exercises  · Plan of Care Expires: 08/15/20  · Plan of Care Reviewed with: patient    Subjective     Pain/Comfort:  · Pain Rating 1: 0/10  · Pain Rating Post-Intervention 1: 0/10    Objective:     Communicated with: nurse prior to session.  Patient found HOB elevated with telemetry, peripheral IV upon OT entry to room.    General Precautions: Standard, fall   Orthopedic Precautions:N/A   Braces: N/A     Occupational Performance:     Bed Mobility:    · Patient completed Scooting/Bridging with contact guard assistance  · Patient completed Supine to Sit with contact guard assistance  · Patient completed Sit to Supine with contact guard assistance     Functional Mobility/Transfers:  · Patient completed Toilet Transfer Step Transfer technique with contact guard assistance with  no AD  · Functional Mobility: ambulated 15  feet in the room and bathroom with contact guard assistance and no AD.    Activities of Daily Living:  · Grooming: contact guard assistance to wash hands standing at sink and brush teeth sitting EOB.  · Lower Body Dressing: contact guard assistance to don/doff socks sitting EOB.  · Toileting: contact guard assistance to perform toilet hygiene and clothing management from commode.      [facility] 6 Click ADL: 20    Treatment & Education:  Patient required minimal verbal cues for safety awareness during sitting/standing ADL activity.    Patient left HOB elevated with all lines intact, call button in reach and bed alarm onEducation:      GOALS:   Multidisciplinary Problems     Occupational Therapy Goals        Problem: Occupational Therapy Goal    Goal Priority Disciplines Outcome Interventions   Occupational Therapy Goal     OT, PT/OT Ongoing, Progressing    Description: Goals to be met by: discharge    Patient will increase functional independence with ADLs by performing:    LE Dressing with Stand-by Assistance.  Grooming while standing at sink with Stand-by Assistance.  Toileting from toilet with Stand-by Assistance for hygiene and clothing management.   Toilet transfer to toilet with Stand-by Assistance.                     Time Tracking:     OT Date of Treatment: 07/16/20  OT Start Time: 0925  OT Stop Time: 0948  OT Total Time (min): 23 min    Billable Minutes:Self Care/Home Management 23    Hector Boyle OT  7/16/2020

## 2020-07-16 NOTE — PLAN OF CARE
Problem: Occupational Therapy Goal  Goal: Occupational Therapy Goal  Description: Goals to be met by: discharge    Patient will increase functional independence with ADLs by performing:    LE Dressing with Stand-by Assistance.  Grooming while standing at sink with Stand-by Assistance.  Toileting from toilet with Stand-by Assistance for hygiene and clothing management.   Toilet transfer to toilet with Stand-by Assistance.    Outcome: Ongoing, Progressing

## 2020-07-17 VITALS
SYSTOLIC BLOOD PRESSURE: 129 MMHG | RESPIRATION RATE: 18 BRPM | WEIGHT: 181.69 LBS | HEART RATE: 75 BPM | DIASTOLIC BLOOD PRESSURE: 85 MMHG | HEIGHT: 69 IN | TEMPERATURE: 98 F | BODY MASS INDEX: 26.91 KG/M2 | OXYGEN SATURATION: 98 %

## 2020-07-17 LAB
GLUCOSE SERPL-MCNC: 101 MG/DL (ref 70–110)
GLUCOSE SERPL-MCNC: 118 MG/DL (ref 70–110)

## 2020-07-17 PROCEDURE — 97116 GAIT TRAINING THERAPY: CPT | Mod: CQ

## 2020-07-17 PROCEDURE — 97530 THERAPEUTIC ACTIVITIES: CPT

## 2020-07-17 PROCEDURE — 97530 THERAPEUTIC ACTIVITIES: CPT | Mod: CQ

## 2020-07-17 PROCEDURE — 97535 SELF CARE MNGMENT TRAINING: CPT

## 2020-07-17 PROCEDURE — 25000003 PHARM REV CODE 250: Performed by: INTERNAL MEDICINE

## 2020-07-17 RX ORDER — ISOSORBIDE MONONITRATE 60 MG/1
60 TABLET, EXTENDED RELEASE ORAL DAILY
Qty: 30 TABLET | Refills: 0 | Status: SHIPPED | OUTPATIENT
Start: 2020-07-18 | End: 2020-08-17

## 2020-07-17 RX ADMIN — ISOSORBIDE MONONITRATE 60 MG: 60 TABLET, EXTENDED RELEASE ORAL at 09:07

## 2020-07-17 RX ADMIN — AMLODIPINE BESYLATE 5 MG: 5 TABLET ORAL at 09:07

## 2020-07-17 RX ADMIN — PANTOPRAZOLE SODIUM 40 MG: 40 TABLET, DELAYED RELEASE ORAL at 09:07

## 2020-07-17 NOTE — PT/OT/SLP PROGRESS
Physical Therapy Treatment    Patient Name:  Tomasz Chavez   MRN:  [id number]    Recommendations:     Discharge Recommendations:  (SNF vs 24/7 care)   Discharge Equipment Recommendations: (tbd)   Barriers to discharge: Pt requires assistance with all OOB mobility to ensure safety with functional mobility     Assessment:     Tomasz Chavez is a 79 y.o. male admitted with a medical diagnosis of Symptomatic bradycardia.  He presents with the following impairments/functional limitations:  weakness, impaired endurance, impaired self care skills, impaired functional mobilty, gait instability, impaired balance, decreased lower extremity function, decreased safety awareness. Pt required total A to don brief prior to ambulation. Pt required mod A to don new gown. Pt ambulated in son noting unsteadiness, but no LOB. Pt ambulated with RLE externally rotated also noting decreased adonay 2/2 decreased step length of LLE and decreased stance time on RLE. Pt reports pain in RLE while ambulating noting Pt was unable to state exact location of pain. Pt requires verbal cuing for safety throughout tx.  Continue with PT and POC.     Rehab Prognosis: Fair; patient would benefit from acute skilled PT services to address these deficits and reach maximum level of function.    Recent Surgery: * No surgery found *      Plan:     During this hospitalization, patient to be seen 5 x/week to address the identified rehab impairments via gait training, therapeutic activities, therapeutic exercises and progress toward the following goals:    · Plan of Care Expires:  08/14/20    Subjective     Chief Complaint: Pt reports general fatigue  Patient/Family Comments/goals: return home   Pain/Comfort:  · Pain Rating 1: 0/10      Objective:     Communicated with RN prior to session.  Patient found HOB elevated with telemetry upon PT entry to room.     General Precautions: Standard, fall   Orthopedic Precautions:N/A   Braces:       Functional  Mobility:  · Bed Mobility:     · Rolling Left:  minimum assistance  · Rolling Right: minimum assistance  · Scooting: maximal assistance  · Bridging: minimum assistance  · Supine to Sit: minimum assistance  · Sit to Supine: stand by assistance  · Transfers:     · Sit to Stand:  contact guard assistance with no AD  · Gait: 50 feet without the use of an AD and min A x 2 trials       AM-PAC 6 CLICK MOBILITY          Therapeutic Activities and Exercises:   bed mobility; sitting EOB for trunk control and midline orientation; sit<> stands; transfer training; gait training     Patient left HOB elevated with all lines intact, call button in reach and bed alarm on..    GOALS:   Multidisciplinary Problems     Physical Therapy Goals        Problem: Physical Therapy Goal    Goal Priority Disciplines Outcome Goal Variances Interventions   Physical Therapy Goal     PT, PT/OT Ongoing, Progressing     Description: Goals to be met by: discharge     Patient will increase functional independence with mobility by performin. Supine to sit with Stand-by Assistance  2. Sit to stand transfer with Supervision  3. Bed to chair transfer with Supervision using Rolling Walker or no assistive device  4. Gait  x 200 feet with Supervision using Rolling Walker or no assistive device.                      Time Tracking:     PT Received On: 20  PT Start Time: 1008     PT Stop Time: 1031  PT Total Time (min): 23 min     Billable Minutes: Gait Training 10 and Therapeutic Activity 13    Treatment Type: Treatment  PT/PTA: PTA     PTA Visit Number: 2     Hillary Hammant, PTA  2020

## 2020-07-17 NOTE — PLAN OF CARE
07/17/20 1114   Post-Acute Status   Post-Acute Authorization Placement   [facility] can not meet his needs. [facility] accepted patient and working with Son to do Paperwork. Submitted to N for authorization and waiting on approval.

## 2020-07-17 NOTE — PLAN OF CARE
07/17/20 1445   Final Note   Assessment Type Final Discharge Note   Anticipated Discharge Disposition SNF   Liliane from [facility] called with authorization for SNF at [facility] which is [id number]. Sent discharge orders to [facility]. Let Everardo know to call report to Winifred at [phone].[facility] will bring transportation to come pick him up.

## 2020-07-17 NOTE — HOSPITAL COURSE
Patient is an 79-year-old male with a history of advanced dementia.  He was admitted with questionable syncope.  Patient had sligh bradycardia on admission.  His metoprolol has been discontinued.  Patient has remained hemodynamically stable.  He appears stable for discharge to skilled nursing facility.  According to records patient is a do not resuscitate

## 2020-07-17 NOTE — DISCHARGE SUMMARY
[facility] Medicine  Discharge Summary      Patient Name: Tomasz Chavez  MRN: [id number]  Admission Date: 7/9/2020  Hospital Length of Stay: 8 days  Discharge Date and Time:  07/17/2020 1:52 PM  Attending Physician: Yimi Warren DO   Discharging Provider: Yimi Warren DO  Primary Care Provider: Kendrick Hatfield MD      HPI:   No notes on file    * No surgery found *      Hospital Course:   Patient is an 79-year-old male with a history of advanced dementia.  He was admitted with questionable syncope.  Patient had sligh bradycardia on admission.  His metoprolol has been discontinued.  Patient has remained hemodynamically stable.  He appears stable for discharge to skilled nursing facility.  According to records patient is a do not resuscitate     Consults:   Consults (From admission, onward)        Status Ordering Provider     Inpatient consult to Cardiology  Once     Provider:  Mary Phillips MD    Acknowledged BEENA TRIMBLE     Inpatient consult to Hospitalist  Once     Provider:  Otilio Loaiza MD    Acknowledged ELIDA BLAIR     Inpatient consult to   Once     Provider:  (Not yet assigned)    Completed VISHNU CAVAZOS     Inpatient consult to Social Work/Case Management  Once     Provider:  (Not yet assigned)    Completed VISHNU CAVAZOS          Discharge diagnoses  #1 Possible Syncope-etiology not identified  #2 bradycardia-resolved.  D/C'd Toprol  #3 Acute delirium with underlying dementia-now  baseline  #4 CKD 3-stable  #5 Essential HTN-  #6 Normocytic anemia-  #7 DNR     Final Active Diagnoses:    Diagnosis Date Noted POA    PRINCIPAL PROBLEM:  Symptomatic bradycardia [R00.1] 07/09/2020 Yes    CKD (chronic kidney disease), stage III [N18.3] 07/09/2020 Yes    Dementia [F03.90] 07/09/2020 Yes    Syncope [R55] 07/09/2020 Yes      Problems Resolved During this Admission:       Discharged Condition:   Patient appears in no acute distress  Lungs are  clear  Heart is regular  Psych patient is calm, easily redirected    Disposition: Skilled Nursing Facility    Follow Up:   Contact information for follow-up providers     Kendrick Hatfield MD In 1 week.    Specialty: Family Medicine  Contact information:  [address] [place] [address]  [phone]                   Contact information for after-discharge care     Destination     [facility].    Service: Skilled Nursing  Contact information:  [address] [place] [address]  [phone]                           Patient Instructions:      Diet Cardiac     Activity as tolerated   Order Comments: Fall precautions     [id number]}    Pending Diagnostic Studies:     None         Medications:  Reconciled Home Medications:      Medication List      START taking these medications    isosorbide mononitrate 60 MG 24 hr tablet  Commonly known as: IMDUR  Take 1 tablet (60 mg total) by mouth once daily.  Start taking on: July 18, 2020        CONTINUE taking these medications    amLODIPine 5 MG tablet  Commonly known as: NORVASC  5 mg 2 (two) times daily.     atorvastatin 10 MG tablet  Commonly known as: LIPITOR  10 mg nightly.     donepeziL 10 MG tablet  Commonly known as: ARICEPT  once daily.     multivitamin capsule  Take 1 capsule by mouth once daily.     pantoprazole 40 MG tablet  Commonly known as: PROTONIX     VITAMIN D3 50 mcg (2,000 unit) Cap  Generic drug: cholecalciferol (vitamin D3)  Take 1 capsule by mouth nightly.        STOP taking these medications    fosinopriL 40 MG tablet  Commonly known as: MONOPRIL     furosemide 20 MG tablet  Commonly known as: LASIX     metoprolol succinate 50 MG 24 hr tablet  Commonly known as: TOPROL-XL     potassium chloride 8 mEq Cpsr  Commonly known as: MICRO-K     vitamin E 400 UNIT capsule            Indwelling Lines/Drains at time of discharge:   Lines/Drains/Airways     None                 Time spent on the discharge of patient: 26 minutes  Patient was seen  and examined on the date of discharge and determined to be suitable for discharge.         Yimi Warren DO  Department of Hospital Medicine  [facility]

## 2020-07-17 NOTE — SUBJECTIVE & OBJECTIVE
Interval History:  No acute events    Objective:     Vital Signs (Most Recent):  Temp: 97.8 °F (36.6 °C) (07/17/20 0720)  Pulse: 69 (07/17/20 0720)  Resp: 17 (07/17/20 0720)  BP: (!) 144/94 (07/17/20 0720)  SpO2: 98 % (07/17/20 0720) Vital Signs (24h Range):  Temp:  [97.8 °F (36.6 °C)-98.5 °F (36.9 °C)] 97.8 °F (36.6 °C)  Pulse:  [62-97] 69  Resp:  [16-18] 17  SpO2:  [96 %-99 %] 98 %  BP: ()/(53-94) 144/94     Weight: 82.4 kg (181 lb 10.5 oz)  Body mass index is 26.83 kg/m².    Intake/Output Summary (Last 24 hours) at 7/17/2020 1041  Last data filed at 7/17/2020 0801  Gross per 24 hour   Intake 200 ml   Output --   Net 200 ml      Physical Exam patient asleep no distress  Lungs good air move  Heart regular  Neuro patient moves all extremities    Significant Labs: BMP: No results for input(s): GLU, NA, K, CL, CO2, BUN, CREATININE, CALCIUM, MG in the last 48 hours.  CBC: No results for input(s): WBC, HGB, HCT, PLT in the last 48 hours.  CMP: No results for input(s): NA, K, CL, CO2, GLU, BUN, CREATININE, CALCIUM, PROT, ALBUMIN, BILITOT, ALKPHOS, AST, ALT, ANIONGAP, EGFRNONAA in the last 48 hours.    Invalid input(s): ESTGFAFRICA    Significant Imaging:

## 2020-07-17 NOTE — PROGRESS NOTES
"[facility]  Adult Nutrition   Progress Note (Follow-Up)    SUMMARY     Recommendations/Interventions:    Recommendation/Intervention: 1. Cont current diet and supplement. 2. Provide assistance during meal time secondary to dementia  Goals: 1. Pt to meet atleast 75% of estimated nutritional needs via po intake of meals and supplements  Nutrition Goal Status: goal met    Dietitian Rounds Brief:    RD attended rounds- no concerns mentioned     Reason for Assessment  Reason For Assessment: RD follow-up  Relevant Medical History: Demntia, GERD, HTN  Interdisciplinary Rounds: attended    Nutrition Risk Screen  Nutrition Risk Screen: no indicators present     Nutrition/Diet History  Patient Reported Diet/Restrictions/Preferences: general  Spiritual, Cultural Beliefs, Anabaptist Practices, Values that Affect Care: no  Food Allergies: NKFA  Factors Affecting Nutritional Intake: impaired cognitive status/motor control    Anthropometrics  Temp: 97.8 °F (36.6 °C)  Height Method: Stated  Height: 5' 9" (175.3 cm)  Height (inches): 69 in  Weight Method: Bed Scale  Weight: 82.4 kg (181 lb 10.5 oz)  Weight (lb): 181.66 lb  Ideal Body Weight (IBW), Male: 160 lb  % Ideal Body Weight, Male (lb): 113.54 %  BMI (Calculated): 26.8  BMI Grade: 25 - 29.9 - overweight       Weight History:  Wt Readings from Last 10 Encounters:   07/17/20 82.4 kg (181 lb 10.5 oz)   10/11/16 113.4 kg (250 lb)   10/05/16 111.1 kg (245 lb)   09/22/16 112.1 kg (247 lb 2.2 oz)   }  Lab/Procedures/Meds: Pertinent Labs Reviewed  Clinical Chemistry:  Recent Labs   Lab 07/12/20  0303 07/14/20  0533    136   K 3.7 4.1    100   CO2 26 25   GLU 98 92   BUN 25* 33*   CREATININE 1.5* 1.1   CALCIUM 8.3* 8.5*   PROT 5.5*  --    ALBUMIN 3.0*  --    BILITOT 0.4  --    ALKPHOS 47*  --    AST 16  --    ALT 12  --    ANIONGAP 8 11   ESTGFRAFRICA 49.8* >60.0   EGFRNONAA 43.0* >60.0   MG  --  1.9    < > = values in this interval not displayed.     CBC: "   Recent Labs   Lab 07/14/20  0533   WBC 3.86*   RBC 3.14*   HGB 10.5*   HCT 31.9*      *   MCH 33.4*   MCHC 32.9   Medications: Pertinent Medications reviewed  Scheduled Meds:   amLODIPine  5 mg Oral BID    atorvastatin  10 mg Oral QHS    isosorbide mononitrate  60 mg Oral Daily    pantoprazole  40 mg Oral Daily     Continuous Infusions:  PRN Meds:.acetaminophen, haloperidol lactate, lorazepam, melatonin, sodium chloride 0.9%, traMADoL, ziprasidone    Estimated/Assessed Needs    Weight Used For Calorie Calculations: 82.4 kg (181 lb 10.5 oz)  Energy Calorie Requirements (kcal): 2060 claories (25 kcal/kg BW)  Energy Need Method: Kcal/kg  Protein Requirements: 83 g (1 g/kg BW)  Weight Used For Protein Calculations: 82.4 kg (181 lb 10.5 oz)  Fluid Requirements (mL): 2060 ml  Estimated Fluid Requirement Method: RDA Method    Nutrition Prescription Ordered    Current Diet Order: Cardiac  Oral Nutrition Supplement: Ensure Enlive at all meals    Evaluation of Received Nutrient/Fluid Intake    Energy Calories Required: meeting needs  Protein Required: meeting needs  Fluid Required: meeting needs  % Intake of Estimated Energy Needs: 75 - 100 %  % Meal Intake: 75 - 100 %  No intake or output data in the 24 hours ending 07/17/20 0913   Nutrition Risk    Level of Risk/Frequency of Follow-up: moderate - high   Monitor and Evaluation    Food and Nutrient Intake: energy intake, food and beverage intake  Food and Nutrient Adminstration: diet order  Knowledge/Beliefs/Attitudes: food and nutrition knowledge/skill  Physical Activity and Function: nutrition-related ADLs and IADLs  Anthropometric Measurements: weight, weight change  Biochemical Data, Medical Tests and Procedures: lipid profile, electrolyte and renal panel, gastrointestinal profile, glucose/endocrine profile, inflammatory profile  Nutrition-Focused Physical Findings: overall appearance     Nutrition Follow-Up    RD Follow-up?: Yes     Miranda Carroll  MS, RD, LDN, [facility] 7/17/20

## 2020-07-17 NOTE — PROGRESS NOTES
[facility] Medicine  Progress Note    Patient Name: Tomasz Chavez  MRN: [id number]  Patient Class: IP- Inpatient   Admission Date: 7/9/2020  Length of Stay: 8 days  Attending Physician: Yimi Warren DO  Primary Care Provider: Kendrick Hatfield MD        Subjective:     Principal Problem:Symptomatic bradycardia        HPI:  No notes on file    Overview/Hospital Course:  No notes on file    Interval History:  No acute events    Objective:     Vital Signs (Most Recent):  Temp: 97.8 °F (36.6 °C) (07/17/20 0720)  Pulse: 69 (07/17/20 0720)  Resp: 17 (07/17/20 0720)  BP: (!) 144/94 (07/17/20 0720)  SpO2: 98 % (07/17/20 0720) Vital Signs (24h Range):  Temp:  [97.8 °F (36.6 °C)-98.5 °F (36.9 °C)] 97.8 °F (36.6 °C)  Pulse:  [62-97] 69  Resp:  [16-18] 17  SpO2:  [96 %-99 %] 98 %  BP: ()/(53-94) 144/94     Weight: 82.4 kg (181 lb 10.5 oz)  Body mass index is 26.83 kg/m².    Intake/Output Summary (Last 24 hours) at 7/17/2020 1041  Last data filed at 7/17/2020 0801  Gross per 24 hour   Intake 200 ml   Output --   Net 200 ml      Physical Exam patient asleep no distress  Lungs good air move  Heart regular  Neuro patient moves all extremities    Significant Labs: BMP: No results for input(s): GLU, NA, K, CL, CO2, BUN, CREATININE, CALCIUM, MG in the last 48 hours.  CBC: No results for input(s): WBC, HGB, HCT, PLT in the last 48 hours.  CMP: No results for input(s): NA, K, CL, CO2, GLU, BUN, CREATININE, CALCIUM, PROT, ALBUMIN, BILITOT, ALKPHOS, AST, ALT, ANIONGAP, EGFRNONAA in the last 48 hours.    Invalid input(s): ESTGFAFRICA    Significant Imaging:       Assessment/Plan:   #1 Possible Syncope-etiology not identified  #2 bradycardia-resolved.  D/C'd Toprol  #3 Acute delirium with underlying dementia-now  baseline  #4 CKD 3-stable  #5 Essential HTN-appears controlled  #6 Normocytic anemia-most likely chronic disease  #7 DNR       Remains calm  Still Waiting for placement   Medically stable for  discharge    VTE Risk Mitigation (From admission, onward)         Ordered     IP VTE LOW RISK PATIENT  Once      07/09/20 2043     Place sequential compression device  Until discontinued      07/09/20 2043                      Yimi Warren DO  Department of Hospital Medicine   [facility]

## 2020-07-17 NOTE — PLAN OF CARE
07/17/20 1440   Medicare Message   Important Message from Medicare regarding Discharge Appeal Rights Given to patient/caregiver;Explained to patient/caregiver;Signed/date by patient/caregiver   Date IMM was signed 07/17/20   Time IMM was signed 0240

## 2020-08-09 ENCOUNTER — HOSPITAL ENCOUNTER (OUTPATIENT)
Facility: HOSPITAL | Age: 80
Discharge: HOME HEALTH | End: 2020-08-11
Attending: EMERGENCY MEDICINE | Admitting: INTERNAL MEDICINE
Payer: MEDICARE

## 2020-08-09 DIAGNOSIS — G93.40 ENCEPHALOPATHY ACUTE: ICD-10-CM

## 2020-08-09 DIAGNOSIS — R00.1 BRADYCARDIA: ICD-10-CM

## 2020-08-09 DIAGNOSIS — G30.8 ALZHEIMER'S DISEASE OF OTHER ONSET WITHOUT BEHAVIORAL DISTURBANCE: Primary | ICD-10-CM

## 2020-08-09 DIAGNOSIS — R55 SYNCOPE, UNSPECIFIED SYNCOPE TYPE: ICD-10-CM

## 2020-08-09 DIAGNOSIS — F02.80 ALZHEIMER'S DISEASE OF OTHER ONSET WITHOUT BEHAVIORAL DISTURBANCE: Primary | ICD-10-CM

## 2020-08-09 DIAGNOSIS — R41.82 AMS (ALTERED MENTAL STATUS): ICD-10-CM

## 2020-08-09 DIAGNOSIS — R40.4 ALTERED AWARENESS, TRANSIENT: ICD-10-CM

## 2020-08-09 LAB
ALBUMIN SERPL BCP-MCNC: 3.3 G/DL (ref 3.5–5.2)
ALP SERPL-CCNC: 54 U/L (ref 55–135)
ALT SERPL W/O P-5'-P-CCNC: 17 U/L (ref 10–44)
AMMONIA PLAS-SCNC: <9 UMOL/L (ref 10–50)
ANION GAP SERPL CALC-SCNC: 9 MMOL/L (ref 8–16)
AST SERPL-CCNC: 20 U/L (ref 10–40)
BASOPHILS # BLD AUTO: 0.02 K/UL (ref 0–0.2)
BASOPHILS NFR BLD: 0.3 % (ref 0–1.9)
BILIRUB SERPL-MCNC: 0.7 MG/DL (ref 0.1–1)
BUN SERPL-MCNC: 25 MG/DL (ref 8–23)
CALCIUM SERPL-MCNC: 8.7 MG/DL (ref 8.7–10.5)
CHLORIDE SERPL-SCNC: 108 MMOL/L (ref 95–110)
CK MB SERPL-MCNC: 2 NG/ML (ref 0.1–6.5)
CK SERPL-CCNC: 43 U/L (ref 20–200)
CO2 SERPL-SCNC: 25 MMOL/L (ref 23–29)
CREAT SERPL-MCNC: 1.4 MG/DL (ref 0.5–1.4)
DIFFERENTIAL METHOD: ABNORMAL
EOSINOPHIL # BLD AUTO: 0.2 K/UL (ref 0–0.5)
EOSINOPHIL NFR BLD: 2.5 % (ref 0–8)
ERYTHROCYTE [DISTWIDTH] IN BLOOD BY AUTOMATED COUNT: 13.9 % (ref 11.5–14.5)
EST. GFR  (AFRICAN AMERICAN): 54.1 ML/MIN/1.73 M^2
EST. GFR  (NON AFRICAN AMERICAN): 46.8 ML/MIN/1.73 M^2
GLUCOSE SERPL-MCNC: 135 MG/DL (ref 70–110)
GLUCOSE SERPL-MCNC: 136 MG/DL (ref 70–110)
HCT VFR BLD AUTO: 29.5 % (ref 40–54)
HGB BLD-MCNC: 9.5 G/DL (ref 14–18)
IMM GRANULOCYTES # BLD AUTO: 0.01 K/UL (ref 0–0.04)
IMM GRANULOCYTES NFR BLD AUTO: 0.2 % (ref 0–0.5)
LACTATE SERPL-SCNC: 1.2 MMOL/L (ref 0.5–1.9)
LYMPHOCYTES # BLD AUTO: 1.6 K/UL (ref 1–4.8)
LYMPHOCYTES NFR BLD: 23.8 % (ref 18–48)
MCH RBC QN AUTO: 33.1 PG (ref 27–31)
MCHC RBC AUTO-ENTMCNC: 32.2 G/DL (ref 32–36)
MCV RBC AUTO: 103 FL (ref 82–98)
MONOCYTES # BLD AUTO: 0.2 K/UL (ref 0.3–1)
MONOCYTES NFR BLD: 3.7 % (ref 4–15)
NEUTROPHILS # BLD AUTO: 4.5 K/UL (ref 1.8–7.7)
NEUTROPHILS NFR BLD: 69.5 % (ref 38–73)
NRBC BLD-RTO: 0 /100 WBC
PLATELET # BLD AUTO: 293 K/UL (ref 150–350)
PMV BLD AUTO: 9.9 FL (ref 9.2–12.9)
POTASSIUM SERPL-SCNC: 3.9 MMOL/L (ref 3.5–5.1)
PROT SERPL-MCNC: 6.4 G/DL (ref 6–8.4)
RBC # BLD AUTO: 2.87 M/UL (ref 4.6–6.2)
SARS-COV-2 RDRP RESP QL NAA+PROBE: NEGATIVE
SODIUM SERPL-SCNC: 142 MMOL/L (ref 136–145)
TROPONIN I SERPL DL<=0.01 NG/ML-MCNC: <0.03 NG/ML
TSH SERPL DL<=0.005 MIU/L-ACNC: 3.76 UIU/ML (ref 0.34–5.6)
WBC # BLD AUTO: 6.52 K/UL (ref 3.9–12.7)

## 2020-08-09 PROCEDURE — 85025 COMPLETE CBC W/AUTO DIFF WBC: CPT

## 2020-08-09 PROCEDURE — 82962 GLUCOSE BLOOD TEST: CPT

## 2020-08-09 PROCEDURE — 99285 EMERGENCY DEPT VISIT HI MDM: CPT | Mod: 25

## 2020-08-09 PROCEDURE — U0002 COVID-19 LAB TEST NON-CDC: HCPCS

## 2020-08-09 PROCEDURE — 80307 DRUG TEST PRSMV CHEM ANLYZR: CPT

## 2020-08-09 PROCEDURE — G0378 HOSPITAL OBSERVATION PER HR: HCPCS

## 2020-08-09 PROCEDURE — 87040 BLOOD CULTURE FOR BACTERIA: CPT

## 2020-08-09 PROCEDURE — 93005 ELECTROCARDIOGRAM TRACING: CPT | Performed by: INTERNAL MEDICINE

## 2020-08-09 PROCEDURE — 82553 CREATINE MB FRACTION: CPT

## 2020-08-09 PROCEDURE — 83605 ASSAY OF LACTIC ACID: CPT

## 2020-08-09 PROCEDURE — 80053 COMPREHEN METABOLIC PANEL: CPT

## 2020-08-09 PROCEDURE — 84484 ASSAY OF TROPONIN QUANT: CPT

## 2020-08-09 PROCEDURE — 36415 COLL VENOUS BLD VENIPUNCTURE: CPT

## 2020-08-09 PROCEDURE — 84443 ASSAY THYROID STIM HORMONE: CPT

## 2020-08-09 PROCEDURE — 82140 ASSAY OF AMMONIA: CPT

## 2020-08-09 PROCEDURE — 25000003 PHARM REV CODE 250: Performed by: NURSE PRACTITIONER

## 2020-08-09 PROCEDURE — 82550 ASSAY OF CK (CPK): CPT

## 2020-08-09 RX ORDER — MIRTAZAPINE 15 MG/1
30 TABLET, FILM COATED ORAL NIGHTLY
Status: DISCONTINUED | OUTPATIENT
Start: 2020-08-09 | End: 2020-08-11 | Stop reason: HOSPADM

## 2020-08-09 RX ORDER — ONDANSETRON 2 MG/ML
4 INJECTION INTRAMUSCULAR; INTRAVENOUS EVERY 6 HOURS PRN
Status: DISCONTINUED | OUTPATIENT
Start: 2020-08-09 | End: 2020-08-11 | Stop reason: HOSPADM

## 2020-08-09 RX ORDER — AMLODIPINE BESYLATE 5 MG/1
10 TABLET ORAL DAILY
Status: DISCONTINUED | OUTPATIENT
Start: 2020-08-10 | End: 2020-08-10

## 2020-08-09 RX ORDER — ATORVASTATIN CALCIUM 10 MG/1
10 TABLET, FILM COATED ORAL NIGHTLY
Status: DISCONTINUED | OUTPATIENT
Start: 2020-08-09 | End: 2020-08-11 | Stop reason: HOSPADM

## 2020-08-09 RX ORDER — SODIUM CHLORIDE 0.9 % (FLUSH) 0.9 %
10 SYRINGE (ML) INJECTION
Status: DISCONTINUED | OUTPATIENT
Start: 2020-08-09 | End: 2020-08-11 | Stop reason: HOSPADM

## 2020-08-09 RX ORDER — PANTOPRAZOLE SODIUM 40 MG/1
40 TABLET, DELAYED RELEASE ORAL DAILY
Status: DISCONTINUED | OUTPATIENT
Start: 2020-08-10 | End: 2020-08-11 | Stop reason: HOSPADM

## 2020-08-09 RX ORDER — AMLODIPINE BESYLATE 10 MG/1
1 TABLET ORAL DAILY
Status: ON HOLD | COMMUNITY
Start: 2020-08-06 | End: 2020-08-11 | Stop reason: HOSPADM

## 2020-08-09 RX ORDER — LISINOPRIL 40 MG/1
40 TABLET ORAL DAILY
Status: ON HOLD | COMMUNITY
Start: 2020-08-06 | End: 2020-08-26 | Stop reason: HOSPADM

## 2020-08-09 RX ORDER — APIXABAN 5 MG/1
1 TABLET, FILM COATED ORAL 2 TIMES DAILY
COMMUNITY
Start: 2020-08-06

## 2020-08-09 RX ORDER — ESCITALOPRAM OXALATE 10 MG/1
10 TABLET ORAL DAILY
Status: DISCONTINUED | OUTPATIENT
Start: 2020-08-10 | End: 2020-08-11 | Stop reason: HOSPADM

## 2020-08-09 RX ORDER — FUROSEMIDE 40 MG/1
40 TABLET ORAL DAILY
Status: ON HOLD | COMMUNITY
Start: 2020-08-06 | End: 2020-08-26 | Stop reason: HOSPADM

## 2020-08-09 RX ORDER — MIRTAZAPINE 30 MG/1
30 TABLET, FILM COATED ORAL NIGHTLY
Status: ON HOLD | COMMUNITY
Start: 2020-08-06 | End: 2020-08-26 | Stop reason: HOSPADM

## 2020-08-09 RX ORDER — DOCUSATE SODIUM 100 MG/1
100 CAPSULE, LIQUID FILLED ORAL 2 TIMES DAILY
Status: DISCONTINUED | OUTPATIENT
Start: 2020-08-09 | End: 2020-08-11 | Stop reason: HOSPADM

## 2020-08-09 RX ORDER — DOCUSATE SODIUM 100 MG/1
100 CAPSULE, LIQUID FILLED ORAL 2 TIMES DAILY
COMMUNITY

## 2020-08-09 RX ORDER — FUROSEMIDE 20 MG/1
20 TABLET ORAL DAILY
Status: DISCONTINUED | OUTPATIENT
Start: 2020-08-10 | End: 2020-08-11 | Stop reason: HOSPADM

## 2020-08-09 RX ORDER — LEVOTHYROXINE SODIUM 25 UG/1
25 TABLET ORAL DAILY
Status: DISCONTINUED | OUTPATIENT
Start: 2020-08-10 | End: 2020-08-11 | Stop reason: HOSPADM

## 2020-08-09 RX ORDER — LEVOTHYROXINE SODIUM 25 UG/1
1 TABLET ORAL DAILY
COMMUNITY
Start: 2020-08-06

## 2020-08-09 RX ORDER — METOPROLOL SUCCINATE 50 MG/1
1 TABLET, EXTENDED RELEASE ORAL DAILY
Status: ON HOLD | COMMUNITY
Start: 2020-08-06 | End: 2020-08-11 | Stop reason: HOSPADM

## 2020-08-09 RX ORDER — POTASSIUM CHLORIDE 750 MG/1
10 CAPSULE, EXTENDED RELEASE ORAL DAILY
Status: DISCONTINUED | OUTPATIENT
Start: 2020-08-10 | End: 2020-08-11 | Stop reason: HOSPADM

## 2020-08-09 RX ORDER — ISOSORBIDE MONONITRATE 30 MG/1
60 TABLET, EXTENDED RELEASE ORAL DAILY
Status: DISCONTINUED | OUTPATIENT
Start: 2020-08-10 | End: 2020-08-11 | Stop reason: HOSPADM

## 2020-08-09 RX ORDER — LISINOPRIL 20 MG/1
40 TABLET ORAL DAILY
Status: DISCONTINUED | OUTPATIENT
Start: 2020-08-10 | End: 2020-08-11 | Stop reason: HOSPADM

## 2020-08-09 RX ORDER — DONEPEZIL HYDROCHLORIDE 5 MG/1
10 TABLET, FILM COATED ORAL DAILY
Status: DISCONTINUED | OUTPATIENT
Start: 2020-08-10 | End: 2020-08-11 | Stop reason: HOSPADM

## 2020-08-09 RX ORDER — POTASSIUM CHLORIDE 750 MG/1
1 TABLET, EXTENDED RELEASE ORAL DAILY
COMMUNITY
Start: 2020-08-06

## 2020-08-09 RX ORDER — ESCITALOPRAM OXALATE 10 MG/1
10 TABLET ORAL DAILY
Status: ON HOLD | COMMUNITY
Start: 2020-08-06 | End: 2020-08-26 | Stop reason: HOSPADM

## 2020-08-09 RX ORDER — ACETAMINOPHEN 325 MG/1
650 TABLET ORAL EVERY 4 HOURS PRN
Status: DISCONTINUED | OUTPATIENT
Start: 2020-08-09 | End: 2020-08-11 | Stop reason: HOSPADM

## 2020-08-09 RX ADMIN — DOCUSATE SODIUM 100 MG: 100 CAPSULE, LIQUID FILLED ORAL at 10:08

## 2020-08-09 RX ADMIN — MIRTAZAPINE 30 MG: 15 TABLET, FILM COATED ORAL at 10:08

## 2020-08-09 RX ADMIN — ATORVASTATIN CALCIUM 10 MG: 10 TABLET, FILM COATED ORAL at 10:08

## 2020-08-09 RX ADMIN — APIXABAN 5 MG: 5 TABLET, FILM COATED ORAL at 10:08

## 2020-08-10 ENCOUNTER — CLINICAL SUPPORT (OUTPATIENT)
Dept: CARDIOLOGY | Facility: HOSPITAL | Age: 80
End: 2020-08-10
Attending: EMERGENCY MEDICINE
Payer: MEDICARE

## 2020-08-10 VITALS — WEIGHT: 172 LBS | HEIGHT: 69 IN | BODY MASS INDEX: 25.48 KG/M2

## 2020-08-10 PROBLEM — G93.40 ACUTE ENCEPHALOPATHY: Status: ACTIVE | Noted: 2020-08-10

## 2020-08-10 PROBLEM — Z66 DNR (DO NOT RESUSCITATE): Status: ACTIVE | Noted: 2020-08-10

## 2020-08-10 LAB
ALBUMIN SERPL BCP-MCNC: 3.1 G/DL (ref 3.5–5.2)
ALP SERPL-CCNC: 54 U/L (ref 55–135)
ALT SERPL W/O P-5'-P-CCNC: 15 U/L (ref 10–44)
ANION GAP SERPL CALC-SCNC: 10 MMOL/L (ref 8–16)
AST SERPL-CCNC: 18 U/L (ref 10–40)
BASOPHILS # BLD AUTO: 0 K/UL (ref 0–0.2)
BASOPHILS NFR BLD: 0 % (ref 0–1.9)
BILIRUB SERPL-MCNC: 0.6 MG/DL (ref 0.1–1)
BSA FOR ECHO PROCEDURE: 1.95 M2
BUN SERPL-MCNC: 25 MG/DL (ref 8–23)
CALCIUM SERPL-MCNC: 8.5 MG/DL (ref 8.7–10.5)
CHLORIDE SERPL-SCNC: 108 MMOL/L (ref 95–110)
CHOLEST SERPL-MCNC: 117 MG/DL (ref 120–199)
CHOLEST/HDLC SERPL: 3.3 {RATIO} (ref 2–5)
CO2 SERPL-SCNC: 24 MMOL/L (ref 23–29)
CREAT SERPL-MCNC: 1.3 MG/DL (ref 0.5–1.4)
DIFFERENTIAL METHOD: ABNORMAL
EOSINOPHIL # BLD AUTO: 0 K/UL (ref 0–0.5)
EOSINOPHIL NFR BLD: 0.8 % (ref 0–8)
ERYTHROCYTE [DISTWIDTH] IN BLOOD BY AUTOMATED COUNT: 13.7 % (ref 11.5–14.5)
EST. GFR  (AFRICAN AMERICAN): 59.2 ML/MIN/1.73 M^2
EST. GFR  (NON AFRICAN AMERICAN): 51.2 ML/MIN/1.73 M^2
GLUCOSE SERPL-MCNC: 106 MG/DL (ref 70–110)
HCT VFR BLD AUTO: 30.3 % (ref 40–54)
HDLC SERPL-MCNC: 35 MG/DL (ref 40–75)
HDLC SERPL: 29.9 % (ref 20–50)
HGB BLD-MCNC: 9.9 G/DL (ref 14–18)
IMM GRANULOCYTES # BLD AUTO: 0.01 K/UL (ref 0–0.04)
IMM GRANULOCYTES NFR BLD AUTO: 0.2 % (ref 0–0.5)
LDLC SERPL CALC-MCNC: 75 MG/DL (ref 63–159)
LYMPHOCYTES # BLD AUTO: 1.1 K/UL (ref 1–4.8)
LYMPHOCYTES NFR BLD: 20.6 % (ref 18–48)
MAGNESIUM SERPL-MCNC: 2 MG/DL (ref 1.6–2.6)
MCH RBC QN AUTO: 33.7 PG (ref 27–31)
MCHC RBC AUTO-ENTMCNC: 32.7 G/DL (ref 32–36)
MCV RBC AUTO: 103 FL (ref 82–98)
MONOCYTES # BLD AUTO: 0.2 K/UL (ref 0.3–1)
MONOCYTES NFR BLD: 4.2 % (ref 4–15)
NEUTROPHILS # BLD AUTO: 3.9 K/UL (ref 1.8–7.7)
NEUTROPHILS NFR BLD: 74.2 % (ref 38–73)
NONHDLC SERPL-MCNC: 82 MG/DL
NRBC BLD-RTO: 0 /100 WBC
PLATELET # BLD AUTO: 249 K/UL (ref 150–350)
PMV BLD AUTO: 10.2 FL (ref 9.2–12.9)
POTASSIUM SERPL-SCNC: 3.7 MMOL/L (ref 3.5–5.1)
PROT SERPL-MCNC: 5.8 G/DL (ref 6–8.4)
RBC # BLD AUTO: 2.94 M/UL (ref 4.6–6.2)
RPR SER QL: NORMAL
SODIUM SERPL-SCNC: 142 MMOL/L (ref 136–145)
TRIGL SERPL-MCNC: 35 MG/DL (ref 30–150)
TROPONIN I SERPL DL<=0.01 NG/ML-MCNC: <0.03 NG/ML
VIT B12 SERPL-MCNC: 185 PG/ML (ref 210–950)
WBC # BLD AUTO: 5.25 K/UL (ref 3.9–12.7)

## 2020-08-10 PROCEDURE — 80053 COMPREHEN METABOLIC PANEL: CPT

## 2020-08-10 PROCEDURE — 36415 COLL VENOUS BLD VENIPUNCTURE: CPT

## 2020-08-10 PROCEDURE — 99900035 HC TECH TIME PER 15 MIN (STAT)

## 2020-08-10 PROCEDURE — G0378 HOSPITAL OBSERVATION PER HR: HCPCS

## 2020-08-10 PROCEDURE — 80061 LIPID PANEL: CPT

## 2020-08-10 PROCEDURE — 93308 TTE F-UP OR LMTD: CPT

## 2020-08-10 PROCEDURE — 84484 ASSAY OF TROPONIN QUANT: CPT

## 2020-08-10 PROCEDURE — 85025 COMPLETE CBC W/AUTO DIFF WBC: CPT

## 2020-08-10 PROCEDURE — 95812 EEG 41-60 MINUTES: CPT

## 2020-08-10 PROCEDURE — 84425 ASSAY OF VITAMIN B-1: CPT

## 2020-08-10 PROCEDURE — 94761 N-INVAS EAR/PLS OXIMETRY MLT: CPT

## 2020-08-10 PROCEDURE — 82607 VITAMIN B-12: CPT

## 2020-08-10 PROCEDURE — 83735 ASSAY OF MAGNESIUM: CPT

## 2020-08-10 PROCEDURE — 86592 SYPHILIS TEST NON-TREP QUAL: CPT

## 2020-08-10 PROCEDURE — 95819 EEG AWAKE AND ASLEEP: CPT

## 2020-08-10 PROCEDURE — 25000003 PHARM REV CODE 250: Performed by: NURSE PRACTITIONER

## 2020-08-10 RX ADMIN — LEVOTHYROXINE SODIUM 25 MCG: 25 TABLET ORAL at 08:08

## 2020-08-10 RX ADMIN — THERA TABS 1 TABLET: TAB at 08:08

## 2020-08-10 RX ADMIN — ATORVASTATIN CALCIUM 10 MG: 10 TABLET, FILM COATED ORAL at 08:08

## 2020-08-10 RX ADMIN — MIRTAZAPINE 30 MG: 15 TABLET, FILM COATED ORAL at 08:08

## 2020-08-10 RX ADMIN — APIXABAN 5 MG: 5 TABLET, FILM COATED ORAL at 08:08

## 2020-08-10 RX ADMIN — DONEPEZIL HYDROCHLORIDE 10 MG: 5 TABLET, FILM COATED ORAL at 08:08

## 2020-08-10 RX ADMIN — FUROSEMIDE 20 MG: 20 TABLET ORAL at 08:08

## 2020-08-10 RX ADMIN — PANTOPRAZOLE SODIUM 40 MG: 40 TABLET, DELAYED RELEASE ORAL at 08:08

## 2020-08-10 RX ADMIN — DOCUSATE SODIUM 100 MG: 100 CAPSULE, LIQUID FILLED ORAL at 08:08

## 2020-08-10 RX ADMIN — LISINOPRIL 40 MG: 20 TABLET ORAL at 08:08

## 2020-08-10 RX ADMIN — ISOSORBIDE MONONITRATE 60 MG: 30 TABLET, EXTENDED RELEASE ORAL at 08:08

## 2020-08-10 RX ADMIN — POTASSIUM CHLORIDE 10 MEQ: 750 CAPSULE, EXTENDED RELEASE ORAL at 08:08

## 2020-08-10 RX ADMIN — ESCITALOPRAM OXALATE 10 MG: 10 TABLET ORAL at 08:08

## 2020-08-10 RX ADMIN — AMLODIPINE BESYLATE 10 MG: 5 TABLET ORAL at 08:08

## 2020-08-10 NOTE — CONSULTS
[facility]  Neurology  Consult Note    Patient Name: Tomasz Chavez  MRN: [id number]  Admission Date: 8/9/2020  Hospital Length of Stay: 0 days  Code Status: DNR   Attending Provider: Casey Tsai MD   Consulting NP: Helen Valle NP  Consulting Provider: Dr. Lane Cameron MD  Primary Care Physician: Kendrick Hatfield MD  Principal Problem:<principal problem not specified>    Inpatient consult to Neurology  Consult performed by: Helen Valle NP  Consult ordered by: Rosalinda Bonilla NP        Subjective:     Chief Complaint:   Chief Complaint   Patient presents with    Decreased LOC       HPI: Tomasz Chavez is a 79 y.o. White male who  has a past medical history of Dementia, GERD (gastroesophageal reflux disease), Hypertension, Sleep apnea, and Wears glasses.. The patient presented to [facility] on 8/9/2020 with a primary complaint of Decreased LOC    History was obtained from the patient, the family on the phone and ER physician Sign-out. Patient presents to the ED via ambulance for decreased level of consciousness/AMS. The patient's son states that the patient ate dinner this evening and went to sit outside at about 5:30. 15 minutes later, the patient put his head down and appeared to have dozed off to sleep. His son states that the patient was drooling, but he was still breathing. He was difficult to arouse. The family thought the patient was over heated, so they sprayed cool water on his legs and feet, and the patient moved his legs in response, but did not open his eyes. No jerking or shaking movements were noted. EMS was called and the patient was brought to the ED for evaluation. While in the ED, the patient becomes more alert. He denies any complaints. He denies having any pain or SOB.      In the emergency room, patient is found to be bradycardic with HR 40s. Other vitals stable.  CBC with H/H 9.5/29.5. CMP with creat 1.4/bun 25. Troponin within reference range. I have  reviewed the EKG and does not show new ischemic changes. No concerning finding on chest x ray. CT head negative.    Neurology Consult Note: Patient seen and examined with Dr. Lane Cameron. Discussed plan of care. No family at bedside. Patient is 79 year old White male with PMHX: Dementia , GERD, Hypertension, Sleep apnea, thyroid disease, and wears glasses. The patient presented to [facility] with Decreased LOC. According to medical record the family reported that the patient ate dinner 8/9/2020 at 5:30 then 15 mintues the patient put his head down and went to sleep but he was difficult to arouses. The patient was reportedly drooling but still breathing. The patient became more alert in ED. Upon exam patient arouses easily from sleep open eyes to sound. Patient unable to recall events that brought him to the hospital, the patient states that he thought he was at home. Patient is disoriented to time, place, and situation. Patient hard of hearing, patient able to follow simple commands, MAEW, patient is able to ambulate. Patient unable to recall name of familiar objects, patient denies visual disturbance, denies dizziness, nausea, denies pain. Patient CT of head negative, Patient had CUS 7/9 less than 50 % stenosis. Will order MRI brain w/o contrast, MRA brain, Tte limited w bubble study. Will rule out acute process. Will continue to follow. Recommend no driving.        Past Medical History:   Diagnosis Date    Dementia     GERD (gastroesophageal reflux disease)     Hypertension     Sleep apnea     does not use machine    Wears glasses        Past Surgical History:   Procedure Laterality Date    APPENDECTOMY      CHOLECYSTECTOMY      FINGER SURGERY      laceration    HERNIA REPAIR         Review of patient's allergies indicates:   Allergen Reactions    Hydralazine analogues        Current Neurological Medications:     No current facility-administered medications on file prior to encounter.      Current Outpatient  Medications on File Prior to Encounter   Medication Sig    docusate sodium (COLACE) 100 MG capsule Take 100 mg by mouth 2 (two) times daily.    amLODIPine (NORVASC) 10 MG tablet Take 1 tablet by mouth once daily.    atorvastatin (LIPITOR) 10 MG tablet 10 mg nightly.     donepezil (ARICEPT) 10 MG tablet once daily.     ELIQUIS 5 mg Tab Take 1 tablet by mouth 2 (two) times daily.    escitalopram oxalate (LEXAPRO) 10 MG tablet Take 1 tablet by mouth once daily.    furosemide (LASIX) 20 MG tablet Take 1 tablet by mouth once daily.    isosorbide mononitrate (IMDUR) 60 MG 24 hr tablet Take 1 tablet (60 mg total) by mouth once daily.    levothyroxine (SYNTHROID) 25 MCG tablet Take 1 tablet by mouth once daily.    lisinopriL (PRINIVIL,ZESTRIL) 40 MG tablet Take 1 tablet by mouth once daily.    metoprolol succinate (TOPROL-XL) 50 MG 24 hr tablet Take 1 tablet by mouth once daily.    mirtazapine (REMERON) 30 MG tablet Take 1 tablet by mouth every evening.    multivitamin capsule Take 1 capsule by mouth once daily.    pantoprazole (PROTONIX) 40 MG tablet     potassium chloride SA (K-DUR,KLOR-CON) 10 MEQ tablet Take 1 tablet by mouth once daily.      Family History     None        Tobacco Use    Smoking status: Former Smoker     Quit date:      Years since quittin.6   Substance and Sexual Activity    Alcohol use: No    Drug use: No    Sexual activity: Not on file     Review of Systems   Unable to perform ROS: Dementia   Constitutional: Negative.    HENT: Positive for hearing loss.    Eyes: Negative.    Respiratory: Negative.    Cardiovascular: Negative.    Gastrointestinal: Negative.    Endocrine: Negative.    Genitourinary: Negative.    Musculoskeletal: Negative.    Allergic/Immunologic: Negative.    Neurological: Positive for syncope.   Hematological: Negative.    Psychiatric/Behavioral: Positive for confusion and decreased concentration.     Objective:     Vital Signs (Most Recent):  Temp: 98.2 °F  (36.8 °C) (08/10/20 0736)  Pulse: (!) 56 (08/10/20 0736)  Resp: 18 (08/10/20 0736)  BP: (!) 192/69 (08/10/20 0736)  SpO2: 97 % (08/10/20 0736) Vital Signs (24h Range):  Temp:  [97.8 °F (36.6 °C)-98.2 °F (36.8 °C)] 98.2 °F (36.8 °C)  Pulse:  [42-56] 56  Resp:  [15-19] 18  SpO2:  [97 %-100 %] 97 %  BP: (133-192)/(59-95) 192/69     Weight: 78.4 kg (172 lb 13.5 oz)  Body mass index is 25.52 kg/m².    Physical Exam  Constitutional:       General: He is awake.      Appearance: Normal appearance. He is well-developed.   HENT:      Head: Normocephalic and atraumatic.      Mouth/Throat:      Mouth: Mucous membranes are moist.      Pharynx: Oropharynx is clear.   Eyes:      General: Lids are normal. Vision grossly intact. No visual field deficit.     Extraocular Movements: Extraocular movements intact.      Right eye: Normal extraocular motion.      Left eye: Normal extraocular motion.      Conjunctiva/sclera: Conjunctivae normal.      Pupils: Pupils are equal, round, and reactive to light.   Neck:      Musculoskeletal: Full passive range of motion without pain and normal range of motion.   Cardiovascular:      Rate and Rhythm: Normal rate and regular rhythm.      Pulses: Normal pulses.      Heart sounds: Normal heart sounds.   Pulmonary:      Effort: Pulmonary effort is normal.      Breath sounds: Normal breath sounds.   Abdominal:      General: Abdomen is flat. Bowel sounds are normal.   Musculoskeletal: Normal range of motion.   Skin:     General: Skin is warm and dry.      Capillary Refill: Capillary refill takes less than 2 seconds.      Findings: Bruising present.   Neurological:      Mental Status: He is alert. He is disoriented.      GCS: GCS eye subscore is 4 - spontaneous. GCS verbal subscore is 4 - confused. GCS motor subscore is 6 - obeys commands.      Cranial Nerves: No dysarthria or facial asymmetry.      Sensory: Sensory deficit present.      Motor: No weakness, tremor, atrophy, abnormal muscle tone, seizure  activity or pronator drift.      Coordination: Coordination normal. Finger-Nose-Finger Test normal.      Deep Tendon Reflexes:      Reflex Scores:       Tricep reflexes are 2+ on the right side and 2+ on the left side.       Bicep reflexes are 2+ on the right side and 2+ on the left side.       Brachioradialis reflexes are 2+ on the right side and 2+ on the left side.       Patellar reflexes are 2+ on the right side and 2+ on the left side.       Achilles reflexes are 2+ on the right side and 2+ on the left side.     Comments: Oriented to person only, disoriented to time, place and situation. Patient has dementia at base, hearing impaired   Psychiatric:         Attention and Perception: He is inattentive.         Mood and Affect: Affect is flat.         Speech: Speech normal.         Behavior: Behavior is cooperative.         Cognition and Memory: Cognition is impaired. Memory is impaired.      Comments: Patient withdrawn and apprehensive          NEUROLOGICAL EXAMINATION:     MENTAL STATUS   Oriented to person.   Disoriented to place. Disoriented to country, city and area.   Disoriented to year.   Follows 2 step commands.   Attention: normal. Concentration: decreased.   Speech: speech is normal   Level of consciousness: arousable by verbal stimuli    CRANIAL NERVES     CN II   Visual acuity: normal  Right visual field deficit: none  Left visual field deficit: none     CN III, IV, VI   Pupils are equal, round, and reactive to light.  Right pupil: Size: 3 mm. Shape: regular. Reactivity: brisk. Consensual response: intact.   Left pupil: Size: 3 mm. Shape: regular. Reactivity: brisk. Consensual response: intact.     CN V   Facial sensation intact.     CN VII   Facial expression full, symmetric.     CN VIII   Hearing: impaired    CN XI   CN XI normal.     CN XII   CN XII normal.     MOTOR EXAM   Muscle bulk: normal  Overall muscle tone: normal  Right arm tone: normal  Left arm tone: normal  Right arm pronator drift:  absent  Left arm pronator drift: absent  Right leg tone: normal  Left leg tone: normal    REFLEXES     Reflexes   Right brachioradialis: 2+  Left brachioradialis: 2+  Right biceps: 2+  Left biceps: 2+  Right triceps: 2+  Left triceps: 2+  Right patellar: 2+  Left patellar: 2+  Right achilles: 2+  Left achilles: 2+  Right plantar: normal  Left plantar: normal    GAIT AND COORDINATION      Coordination   Finger to nose coordination: normal    Tremor   Resting tremor: absent  Intention tremor: absent  Action tremor: absent       MAINOR    NIH Stroke Scale:    Level of Consciousness: 0 - alert  LOC Questions: 1 - answers one correctly  LOC Commands: 1 - performs one correctly  Best Gaze: 0 - normal  Visual: 0 - no visual loss  Facial Palsy: 0 - normal  Motor Left Arm: 0 - no drift  Motor Right Arm: 0 - no drift  Motor Left Le - no drift  Motor Right Le - no drift  Limb Ataxia: 0 - absent  Sensory: 0 - normal  Best Language: 0 - no aphasia  Dysarthria: 0 - normal articulation  Extinction and Inattention: 0 - no neglect  NIH Stroke Scale Total: 2  Jessica Coma Scale:  Best Eye Response: 4 - spontaneous  Best Motor Response: 6 - obeys commands  Best Verbal Response: 4 - confused  [place] Coma Scale Total: 14      Significant Labs:   Results for orders placed or performed during the hospital encounter of 20   Blood culture #1 **CANNOT BE ORDERED STAT**    Specimen: Peripheral, Wrist, Left; Blood   Result Value Ref Range    Blood Culture, Routine No Growth to date    Blood culture #2 **CANNOT BE ORDERED STAT**    Specimen: Peripheral, Antecubital, Left; Blood   Result Value Ref Range    Blood Culture, Routine No Growth to date    CBC auto differential   Result Value Ref Range    WBC 6.52 3.90 - 12.70 K/uL    RBC 2.87 (L) 4.60 - 6.20 M/uL    Hemoglobin 9.5 (L) 14.0 - 18.0 g/dL    Hematocrit 29.5 (L) 40.0 - 54.0 %    Mean Corpuscular Volume 103 (H) 82 - 98 fL    Mean Corpuscular Hemoglobin 33.1 (H) 27.0 - 31.0 pg     Mean Corpuscular Hemoglobin Conc 32.2 32.0 - 36.0 g/dL    RDW 13.9 11.5 - 14.5 %    Platelets 293 150 - 350 K/uL    MPV 9.9 9.2 - 12.9 fL    Immature Granulocytes 0.2 0.0 - 0.5 %    Gran # (ANC) 4.5 1.8 - 7.7 K/uL    Immature Grans (Abs) 0.01 0.00 - 0.04 K/uL    Lymph # 1.6 1.0 - 4.8 K/uL    Mono # 0.2 (L) 0.3 - 1.0 K/uL    Eos # 0.2 0.0 - 0.5 K/uL    Baso # 0.02 0.00 - 0.20 K/uL    nRBC 0 0 /100 WBC    Gran% 69.5 38.0 - 73.0 %    Lymph% 23.8 18.0 - 48.0 %    Mono% 3.7 (L) 4.0 - 15.0 %    Eosinophil% 2.5 0.0 - 8.0 %    Basophil% 0.3 0.0 - 1.9 %    Differential Method Automated    Comprehensive metabolic panel   Result Value Ref Range    Sodium 142 136 - 145 mmol/L    Potassium 3.9 3.5 - 5.1 mmol/L    Chloride 108 95 - 110 mmol/L    CO2 25 23 - 29 mmol/L    Glucose 136 (H) 70 - 110 mg/dL    BUN, Bld 25 (H) 8 - 23 mg/dL    Creatinine 1.4 0.5 - 1.4 mg/dL    Calcium 8.7 8.7 - 10.5 mg/dL    Total Protein 6.4 6.0 - 8.4 g/dL    Albumin 3.3 (L) 3.5 - 5.2 g/dL    Total Bilirubin 0.7 0.1 - 1.0 mg/dL    Alkaline Phosphatase 54 (L) 55 - 135 U/L    AST 20 10 - 40 U/L    ALT 17 10 - 44 U/L    Anion Gap 9 8 - 16 mmol/L    eGFR if African American 54.1 (A) >60 mL/min/1.73 m^2    eGFR if non  46.8 (A) >60 mL/min/1.73 m^2   Lactic acid, plasma   Result Value Ref Range    Lactate (Lactic Acid) 1.2 0.5 - 1.9 mmol/L   Troponin I   Result Value Ref Range    Troponin I <0.030 <=0.040 ng/mL   TSH   Result Value Ref Range    TSH 3.760 0.340 - 5.600 uIU/mL   Ammonia   Result Value Ref Range    Ammonia <9 (A) 10 - 50 umol/L   COVID-19 Rapid Screening   Result Value Ref Range    SARS-CoV-2 RNA, Amplification, Qual Negative Negative   CK   Result Value Ref Range    CPK 43 20 - 200 U/L   CK-MB   Result Value Ref Range    CPK MB 2.0 0.1 - 6.5 ng/mL   Troponin I   Result Value Ref Range    Troponin I <0.030 <=0.040 ng/mL   Comprehensive metabolic panel   Result Value Ref Range    Sodium 142 136 - 145 mmol/L    Potassium 3.7 3.5 -  5.1 mmol/L    Chloride 108 95 - 110 mmol/L    CO2 24 23 - 29 mmol/L    Glucose 106 70 - 110 mg/dL    BUN, Bld 25 (H) 8 - 23 mg/dL    Creatinine 1.3 0.5 - 1.4 mg/dL    Calcium 8.5 (L) 8.7 - 10.5 mg/dL    Total Protein 5.8 (L) 6.0 - 8.4 g/dL    Albumin 3.1 (L) 3.5 - 5.2 g/dL    Total Bilirubin 0.6 0.1 - 1.0 mg/dL    Alkaline Phosphatase 54 (L) 55 - 135 U/L    AST 18 10 - 40 U/L    ALT 15 10 - 44 U/L    Anion Gap 10 8 - 16 mmol/L    eGFR if African American 59.2 (A) >60 mL/min/1.73 m^2    eGFR if non  51.2 (A) >60 mL/min/1.73 m^2   Lipid panel   Result Value Ref Range    Cholesterol 117 (L) 120 - 199 mg/dL    Triglycerides 35 30 - 150 mg/dL    HDL 35 (L) 40 - 75 mg/dL    LDL Cholesterol 75.0 63.0 - 159.0 mg/dL    Hdl/Cholesterol Ratio 29.9 20.0 - 50.0 %    Total Cholesterol/HDL Ratio 3.3 2.0 - 5.0    Non-HDL Cholesterol 82 mg/dL   Magnesium   Result Value Ref Range    Magnesium 2.0 1.6 - 2.6 mg/dL   CBC auto differential   Result Value Ref Range    WBC 5.25 3.90 - 12.70 K/uL    RBC 2.94 (L) 4.60 - 6.20 M/uL    Hemoglobin 9.9 (L) 14.0 - 18.0 g/dL    Hematocrit 30.3 (L) 40.0 - 54.0 %    Mean Corpuscular Volume 103 (H) 82 - 98 fL    Mean Corpuscular Hemoglobin 33.7 (H) 27.0 - 31.0 pg    Mean Corpuscular Hemoglobin Conc 32.7 32.0 - 36.0 g/dL    RDW 13.7 11.5 - 14.5 %    Platelets 249 150 - 350 K/uL    MPV 10.2 9.2 - 12.9 fL    Immature Granulocytes 0.2 0.0 - 0.5 %    Gran # (ANC) 3.9 1.8 - 7.7 K/uL    Immature Grans (Abs) 0.01 0.00 - 0.04 K/uL    Lymph # 1.1 1.0 - 4.8 K/uL    Mono # 0.2 (L) 0.3 - 1.0 K/uL    Eos # 0.0 0.0 - 0.5 K/uL    Baso # 0.00 0.00 - 0.20 K/uL    nRBC 0 0 /100 WBC    Gran% 74.2 (H) 38.0 - 73.0 %    Lymph% 20.6 18.0 - 48.0 %    Mono% 4.2 4.0 - 15.0 %    Eosinophil% 0.8 0.0 - 8.0 %    Basophil% 0.0 0.0 - 1.9 %    Differential Method Automated    POCT glucose   Result Value Ref Range    POC Glucose 135 (H) 70 - 110         Significant Imaging:   Narrative & Impression     CMS MANDATED  QUALITY DATA - CT RADIATION 436. CT scans at this  facility utilize dose modulation, iterative reconstruction, and/or  weight based dosing when appropriate to reduce radiation dose to as  low as reasonably achievable.     PROCEDURE:    CT HEAD WITHOUT CONTRAST  dated  8/9/2020 7:26 PM     CLINICAL HISTORY:   Male 79 years of age.   Altered mental status     TECHNIQUE: CT images were acquired from the foramen magnum to the  vertex. Intravenous contrast was not administered.     COMPARISON: July 9, 2020     FINDINGS:     There is no intracranial hemorrhage, extra-axial fluid collection or  edema. Ventricles, cisterns and sulci are age-appropriate. Bilateral  white matter low-attenuation is compatible with mild chronic small  vessel ischemic disease. There is global mild atrophy for age. Mastoid  air cells are clear. There are mucous retention cysts within the floor  of each maxillary sinus.  Extracranial soft tissue is normal. The calvarium is intact.     IMPRESSION:        1. No acute findings.  2. Global atrophy.  3. No change compared with the prior study     Electronically Signed by Breanne Reese on 8/9/2020 7:59 PM           Narrative & Impression     EXAM DESCRIPTION:  US CAROTID BILATERAL     CLINICAL HISTORY:  79 years  Male;  syncope confusion. History of dementia.     TECHNIQUE:  Grayscale and Doppler (color and pulse) ultrasound of bilateral carotid arteries and the vertebral arteries was performed. Stenosis assessment based on Carotid Artery Stenosis: Gray-Scale and Doppler US DiagnosisSociety of Radiologists in Ultrasound Consensus Conference;  Radiology, Nov 2003, Vol. 229:606002     COMPARISON: None     FINDINGS:     All velocities in cm/sec.     Right carotid:     CCA PSV: 37.7 cm/s  Proximal ICA velocities: 46.8 cm/s (Normal < 124/40)  Proximal ICA EDV: 10.3  cm/s  Mid ICA PSV: 46.8 cm/s  Distal ICA PSV: 45.5 cm/s  ICA/CCA PSV ratio: 1.2 (Normal < 2.0)  ECA: 45.5 cm/s  Right vertebral: Antegrade  flow  Comment: Minimal plaque. Visually there is no stenosis. Color and spectral waveforms are normal. Heart rate was mildly irregular.     Left carotid:  CCA PSV: 37.7 cm/s  ICA velocities: 21.1  cm/s(Normal < 124/40)  Proximal ICA EDV: 6.4 cm/s  Mid ICA PSV: 24.6 cm/s  Distal ICA PSV: 36.4 cm/s  ICA/CCA PSV ratio: 1.0 (Normal < 2.0)  ECA: 46.8 cm/s  Left vertebral: Antegrade flow  Comment: Heart rate is mildly irregular. Calcified plaque is seen at the bulb. There is no stenosis. Color and spectral waveforms are within normal limits.     IMPRESSION:     Less than 50% stenosis of the internal carotid arteries bilaterally.     Irregular heart rate.           Electronically signed by:  Michaelle Em MD  7/10/2020 12:38 AM CDT Workstation: [id number]     · Concentric left ventricular hypertrophy.  · Normal left ventricular systolic function. The estimated ejection fraction is 65%.  · Normal LV diastolic function.  · Normal right ventricular systolic function.  · The aortic root is mildly dilated. Trace aortic regurgitation.  · Mild mitral regurgitation.  · Mild pulmonic regurgitation.      Study Details    A complete echo was performed using complete 2D, color flow Doppler and spectral Doppler. This was a portable study performed at the patient's bedside.   Echocardiography Findings    Left Ventricle Normal ejection fraction at 65%.  Normal left ventricular cavity size. Concentric hypertrophy observed. Normal left ventricular diastolic function.   Right Ventricle Normal cavity size and systolic function.   Left Atrium The left atrial volume index is normal.   Right Atrium There is normal right atrial size.   Aortic Valve Aortic valve sclerosis is mild. There is no aortic stenosis. Trace regurgitation.   Mitral Valve Mild regurgitation.   Tricuspid Valve Trace regurgitation.   Pulmonic Valve Mild regurgitation.   IVC/SVC Inferior vena cava is not well visualized. Inferior vena cava not visualized.   Ascending Aorta The  aortic root is mildly dilated.         Assessment and Plan:  79 year old White male with impression:  1. Acute Encephalopathy Rule out acute Stroke/ Rule out Seizures   2. Near Syncope   CT of head negative  -CUS 7/9 less than 50 % stenosis.   - MRI brain w/o contrast ordered  - MRA brain ordered   -TTE limited w bubble study ordered  -EEG 1 hour ordered  -Encephalopathy labs ordered  TSH 3.760  - LDL 75  -CR 1.5  -Ammonia <9  -COVID-19 negative   UDS pending    Recommend no driving.   3. Symptomatic Bradycardia   -Internal MD to manage   4. HTN  -Internal MD to manage   5. CKD   -Internal MD to manage   6. Dementia   -Internal MD to manage   7. Chronic Anemia   -Internal MD to manage  8. Thyroid Disease   -Internal MD to manageTSH 3.760  Patient to follow up with [facility] at [phone] within 2 weeks from discharge.    Stroke education was provided including stroke risk factors modification and any acute neurological changes including weakness, confusion, visual changes to come straight to the ER.  Seizure education was provided including no driving, no swimming by self, no operation of heavy machinery or climbing on ladders.     All side effects of new medications were discussed with patient and/or next of kin and all questions were answered.               Active Diagnoses:    Diagnosis Date Noted POA    AMS (altered mental status) [R41.82] 08/09/2020 Yes      Problems Resolved During this Admission:       VTE Risk Mitigation (From admission, onward)         Ordered     apixaban tablet 5 mg  2 times daily      08/09/20 2203     Place ADONIS hose  Until discontinued      08/09/20 2203     Reason for No Pharmacological VTE Prophylaxis  Once     Question:  Reasons:  Answer:  Already adequately anticoagulated on oral Anticoagulants    08/09/20 2203     IP VTE HIGH RISK PATIENT  Once      08/09/20 2203                Thank you for your consult. I will follow-up with patient. Please contact us if you have  any additional questions.    Helen Valle NP  Neurology  [facility]

## 2020-08-10 NOTE — PLAN OF CARE
08/10/20 1435   ESPINOZA Message   Medicare Outpatient and Observation Notification regarding financial responsibility Given to patient/caregiver;Explained to patient/caregiver;Signed/date by patient/caregiver;Other (comments)  (Called mariusz Scott  [phone] and informed of observation status.)   Date ESPINOZA was signed 08/10/20   Time ESPINOZA was signed [id number]

## 2020-08-10 NOTE — NURSING
dementia patient here for altered mental status has taken out both of his iv's and will not wear the tele monitor, he has not  had an episode of being unresponsive all night. Dr. Tilley notified.

## 2020-08-10 NOTE — PROCEDURES
EEG Report    Patient name: Tomasz CASTELLANOS  36    Date of Service: 8/10/20    Duration: 62 minutes    Requested By: Rosalinda Bonilla NP                                                                                     Reading Physician: Vincent Hull M.D.        Reason for Study: Encephalopathy.         Clinical History: 78yo man who presented with acute confusion.        AED/sedation: none      Exam Notes:  The recording was performed with the standard 10-20 electrode placement with additional ECG electrodes.     Summary:    There is a posterior dominant rhythm of 7-8 Hz which attenuates with drowsiness. Stage I sleep structures are seen.    Photic stimulation is performed with no abnormal reactivity noted. Hyperventilation is not performed.    No epileptiform discharges or seizures are captured.      Impression:    This is an abnormal awake and asleep routine EEG due to diffuse slowing of the background activity consistent with a mild encephalopathy.

## 2020-08-10 NOTE — ED PROVIDER NOTES
Encounter Date: 2020       History     Chief Complaint   Patient presents with    Decreased LOC     HPI     Seen and evaluated.  Presented with a chief complaint of altered mental status.  He arrives with a decreased GCS and unable to provide history for himself.  Chart review shows a history of a similar presentation as well as advanced dementia.  He has no associated fevers.  Upon arrival, he was only awakening to stimulation, did not open eyes spontaneously, was breathing comfortably with no distress, and would withdraw to painful stimuli like the IV being started.  He was found to be bradycardic by EMS.  He was unable to provide any further complaints, and it was noted that he was being brought to us for evaluation of altered mental status.  This is an acute change  from his baseline and is severe and ongoing      Review of patient's allergies indicates:   Allergen Reactions    Hydralazine analogues      Past Medical History:   Diagnosis Date    Dementia     GERD (gastroesophageal reflux disease)     Hypertension     Sleep apnea     does not use machine    Wears glasses      Past Surgical History:   Procedure Laterality Date    APPENDECTOMY      CHOLECYSTECTOMY      FINGER SURGERY      laceration    HERNIA REPAIR       History reviewed. No pertinent family history.  Social History     Tobacco Use    Smoking status: Former Smoker     Quit date:      Years since quittin.6   Substance Use Topics    Alcohol use: No    Drug use: No     Review of Systems   Unable to perform ROS: Mental status change   All other systems reviewed and are negative.      Physical Exam     Initial Vitals [20 1839]   BP Pulse Resp Temp SpO2   (!) 161/75 (!) 42 18 97.8 °F (36.6 °C) 98 %      MAP       --         Physical Exam    Nursing note and vitals reviewed.  Constitutional:   Frail and ill appearing   HENT:   Head: Normocephalic and atraumatic.   Eyes: Conjunctivae are normal.   Cardiovascular: Regular  rhythm.   bradycardia   Pulmonary/Chest: No respiratory distress.   Abdominal: Soft. Normal appearance.   Musculoskeletal: Normal range of motion.   Neurological: He is oriented to person, place, and time.   Skin: Skin is warm and dry.   Psychiatric: He has a normal mood and affect. His speech is normal.         ED Course   Procedures  Labs Reviewed   CBC W/ AUTO DIFFERENTIAL - Abnormal; Notable for the following components:       Result Value    RBC 2.87 (*)     Hemoglobin 9.5 (*)     Hematocrit 29.5 (*)     Mean Corpuscular Volume 103 (*)     Mean Corpuscular Hemoglobin 33.1 (*)     Mono # 0.2 (*)     Mono% 3.7 (*)     All other components within normal limits   COMPREHENSIVE METABOLIC PANEL - Abnormal; Notable for the following components:    Glucose 136 (*)     BUN, Bld 25 (*)     Albumin 3.3 (*)     Alkaline Phosphatase 54 (*)     eGFR if  54.1 (*)     eGFR if non  46.8 (*)     All other components within normal limits   AMMONIA - Abnormal; Notable for the following components:    Ammonia <9 (*)     All other components within normal limits   POCT GLUCOSE - Abnormal; Notable for the following components:    POC Glucose 135 (*)     All other components within normal limits   LACTIC ACID, PLASMA   TROPONIN I   TSH   SARS-COV-2 RNA AMPLIFICATION, QUAL   CK   DRUGS OF ABUSE SCREEN, BLOOD        ECG Results          EKG 12-lead (In process)  Result time 08/09/20 18:53:16    In process by Interface, Lab In [facility] (08/09/20 18:53:16)                 Narrative:    Test Reason : R00.1,    Vent. Rate : 046 BPM     Atrial Rate : 046 BPM     P-R Int : 340 ms          QRS Dur : 086 ms      QT Int : 562 ms       P-R-T Axes : 075 -15 029 degrees     QTc Int : 491 ms    Sinus bradycardia with 1st degree A-V block with occasional Premature  ventricular complexes  Septal infarct ,age undetermined  Abnormal ECG  When compared with ECG of 10-JUL-2020 06:19,  Sinus rhythm has replaced Junctional  rhythm  Septal infarct is now Present  T wave amplitude has increased in Anterior leads    Referred By: [email or web address]   SELF           Confirmed By:                             Imaging Results          CT Head Without Contrast (Final result)  Result time 08/09/20 19:38:01    Final result by Breanne Reese MD (08/09/20 19:38:01)                 Narrative:    CMS MANDATED QUALITY DATA - CT RADIATION 436. CT scans at this  facility utilize dose modulation, iterative reconstruction, and/or  weight based dosing when appropriate to reduce radiation dose to as  low as reasonably achievable.    PROCEDURE:    CT HEAD WITHOUT CONTRAST  dated  8/9/2020 7:26 PM    CLINICAL HISTORY:   Male 79 years of age.   Altered mental status    TECHNIQUE: CT images were acquired from the foramen magnum to the  vertex. Intravenous contrast was not administered.    COMPARISON: July 9, 2020    FINDINGS:    There is no intracranial hemorrhage, extra-axial fluid collection or  edema. Ventricles, cisterns and sulci are age-appropriate. Bilateral  white matter low-attenuation is compatible with mild chronic small  vessel ischemic disease. There is global mild atrophy for age. Mastoid  air cells are clear. There are mucous retention cysts within the floor  of each maxillary sinus.  Extracranial soft tissue is normal. The calvarium is intact.    IMPRESSION:      1. No acute findings.  2. Global atrophy.  3. No change compared with the prior study    Electronically Signed by Breanne Reese on 8/9/2020 7:59 PM                             X-Ray Chest AP Portable (Final result)  Result time 08/09/20 19:07:00   Procedure changed from X-Ray Chest 1 View     Final result by Breanne Reese MD (08/09/20 19:07:00)                 Narrative:    PROCEDURE:   XR CHEST AP PORTABLE  dated  8/9/2020 7:10 PM    CLINICAL HISTORY:   Male 79 years of age.   loc    TECHNIQUE: AP view of the chest obtained portably at 7:11 PM.    PREVIOUS STUDIES:  None  Available    FINDINGS:    Cardiac and mediastinal contours are normal. Lungs are clear. There is  no pleural effusion or pneumothorax. Bones are unremarkable.      IMPRESSION:    No acute findings. Clear lungs. Normal size heart.    Electronically Signed by Breanne Reese on 8/9/2020 7:53 PM                               Medical Decision Making:   Initial Assessment:   Seen and evaluated.  Transiently altered mental status.  Significant bradycardia.  Bradycardia remains stable, mental status markedly improved likely to baseline, the patient has chronic dementia.  Discussed with son.  Raised the possibility of seizure.  Discussed with Neurology.  Will admit to medicine for further evaluation treatment with consultations in place.                                 Clinical Impression:       ICD-10-CM ICD-9-CM   1. Bradycardia  R00.1 427.89   2. Altered awareness, transient  R40.4 780.02   3. AMS (altered mental status)  R41.82 780.97   4. Encephalopathy acute  G93.40 348.30             ED Disposition Condition    Observation                           Stephen Arriaga Jr., MD  08/10/20 6722

## 2020-08-10 NOTE — PLAN OF CARE
Plan of care, medications and safety reviewed with patient.      Problem: Adult Inpatient Plan of Care  Goal: Plan of Care Review  Outcome: Ongoing, Progressing  Goal: Patient-Specific Goal (Individualization)  Outcome: Ongoing, Progressing  Goal: Absence of Hospital-Acquired Illness or Injury  Outcome: Ongoing, Progressing  Goal: Optimal Comfort and Wellbeing  Outcome: Ongoing, Progressing  Goal: Readiness for Transition of Care  Outcome: Ongoing, Progressing  Goal: Rounds/Family Conference  Outcome: Ongoing, Progressing     Problem: Fall Injury Risk  Goal: Absence of Fall and Fall-Related Injury  Outcome: Ongoing, Progressing

## 2020-08-10 NOTE — H&P
[facility] Medicine History & Physical Examination   Patient Name: Tomasz Chavez  MRN: [id number]  Patient Class: OP- Observation   Admission Date: 8/9/2020  6:35 PM  Length of Stay: 0  Attending Physician: Darshan Flores MD  Primary Care Provider: Kendrick Hatfield MD  Face-to-Face encounter date: 08/09/2020  Code Status: DNR  MPOA: Tyler Chavez- son  Chief Complaint: Decreased LOC        Patient information was obtained from patient, past medical records and ER records.   HISTORY OF PRESENT ILLNESS:   Tomasz Chavez is a 79 y.o. White male who  has a past medical history of Dementia, GERD (gastroesophageal reflux disease), Hypertension, Sleep apnea, and Wears glasses.. The patient presented to [facility] on [facility]/9/2020 with a primary complaint of Decreased LOC  .       History was obtained from the patient, the family on the phone and ER physician Sign-out. Patient presents to the ED via ambulance for decreased level of consciousness/AMS. The patient's son states that the patient ate dinner this evening and went to sit outside at about 5:30. 15 minutes later, the patient put his head down and appeared to have dozed off to sleep. His son states that the patient was drooling, but he was still breathing. He was difficult to arouse. The family thought the patient was over heated, so they sprayed cool water on his legs and feet, and the patient moved his legs in response, but did not open his eyes. No jerking or shaking movements were noted. EMS was called and the patient was brought to the ED for evaluation. While in the ED, the patient becomes more alert. He denies any complaints. He denies having any pain or SOB.     In the emergency room, patient is found to be bradycardic with HR 40s. Other vitals stable.  CBC with H/H 9.5/29.5. CMP with creat 1.4/bun 25. Troponin within reference range. I have reviewed the EKG and does not show new ischemic changes. No concerning finding on chest  x ray. CT head negative.    Decision to admit was taken and patient was informed about the plan of care.   REVIEW OF SYSTEMS:   10 Point Review of System was performed and was found to be negative except for that mentioned already in the HPI above.     PAST MEDICAL HISTORY:     Past Medical History:   Diagnosis Date    Dementia     GERD (gastroesophageal reflux disease)     Hypertension     Sleep apnea     does not use machine    Wears glasses        PAST SURGICAL HISTORY:     Past Surgical History:   Procedure Laterality Date    APPENDECTOMY      CHOLECYSTECTOMY      FINGER SURGERY      laceration    HERNIA REPAIR         ALLERGIES:   Hydralazine analogues    FAMILY HISTORY:   No family history on file.    SOCIAL HISTORY:     Social History     Tobacco Use    Smoking status: Former Smoker     Quit date:      Years since quittin.6   Substance Use Topics    Alcohol use: No        Social History     Substance and Sexual Activity   Sexual Activity Not on file        HOME MEDICATIONS:     Prior to Admission medications    Medication Sig Start Date End Date Taking? Authorizing Provider   docusate sodium (COLACE) 100 MG capsule Take 100 mg by mouth 2 (two) times daily.   Yes Historical Provider, MD   amLODIPine (NORVASC) 10 MG tablet Take 1 tablet by mouth once daily. 20   Historical Provider, MD   atorvastatin (LIPITOR) 10 MG tablet 10 mg nightly.  16   Historical Provider, MD   donepezil (ARICEPT) 10 MG tablet once daily.  16   Historical Provider, MD   ELIQUIS 5 mg Tab Take 1 tablet by mouth 2 (two) times daily. 20   Historical Provider, MD   escitalopram oxalate (LEXAPRO) 10 MG tablet Take 1 tablet by mouth once daily. 20   Historical Provider, MD   furosemide (LASIX) 20 MG tablet Take 1 tablet by mouth once daily. 20   Historical Provider, MD   isosorbide mononitrate (IMDUR) 60 MG 24 hr tablet Take 1 tablet (60 mg total) by mouth once daily. 20  Yimi RAYA  "Summers, DO   levothyroxine (SYNTHROID) 25 MCG tablet Take 1 tablet by mouth once daily. 8/6/20   Historical Provider, MD   lisinopriL (PRINIVIL,ZESTRIL) 40 MG tablet Take 1 tablet by mouth once daily. 8/6/20   Historical Provider, MD   metoprolol succinate (TOPROL-XL) 50 MG 24 hr tablet Take 1 tablet by mouth once daily. 8/6/20   Historical Provider, MD   mirtazapine (REMERON) 30 MG tablet Take 1 tablet by mouth every evening. 8/6/20   Historical Provider, MD   multivitamin capsule Take 1 capsule by mouth once daily.    Historical Provider, MD   pantoprazole (PROTONIX) 40 MG tablet  9/15/16   Historical Provider, MD   potassium chloride SA (K-DUR,KLOR-CON) 10 MEQ tablet Take 1 tablet by mouth once daily. 8/6/20   Historical Provider, MD   amlodipine (NORVASC) 5 MG tablet 5 mg 2 (two) times daily.  8/2/16 8/9/20  Historical Provider, MD   cholecalciferol, vitamin D3, (VITAMIN D3) 2,000 unit Cap Take 1 capsule by mouth nightly.  8/9/20  Historical Provider, MD         PHYSICAL EXAM:   BP (!) 141/95   Pulse (!) 54   Temp 98 °F (36.7 °C)   Resp 19   Ht 5' 9" (1.753 m)   Wt 82.1 kg (181 lb)   SpO2 98%   BMI 26.73 kg/m²   Vitals Reviewed  General appearance: Well-developed, well-nourished, disheveledWhite male in no apparent distress.  Skin: No Rash. Warm, dry.  Neuro: Awake, alert. Oriented to self only.  Motor and sensory exams grossly intact. Good tone. Power in all 4 extremities 5/5.   HENT: Atraumatic head. Moist mucous membranes of oral cavity.  Eyes: Normal extraocular movements.   Neck: Supple. No evidence of lymphadenopathy. No thyroidomegaly.  Lungs: Clear to auscultation bilaterally. No wheezing present.   Heart: Bradycardia, Regular rhythm. S1 and S2 present with no murmurs/gallop/rub. No pedal edema. No JVD present.   Abdomen: Soft, non-distended, non-tender. No rebound tenderness/guarding. No masses or organomegaly. Bowel sounds are normal. Bladder is not palpable.   Extremities: No cyanosis, " clubbing. Capillary refill less than 2 seconds  Psych/mental status: Flat affect Cooperative. Tangential speech.   EMERGENCY DEPARTMENT LABS AND IMAGING:     Labs Reviewed   CBC W/ AUTO DIFFERENTIAL - Abnormal; Notable for the following components:       Result Value    RBC 2.87 (*)     Hemoglobin 9.5 (*)     Hematocrit 29.5 (*)     Mean Corpuscular Volume 103 (*)     Mean Corpuscular Hemoglobin 33.1 (*)     Mono # 0.2 (*)     Mono% 3.7 (*)     All other components within normal limits   COMPREHENSIVE METABOLIC PANEL - Abnormal; Notable for the following components:    Glucose 136 (*)     BUN, Bld 25 (*)     Albumin 3.3 (*)     Alkaline Phosphatase 54 (*)     eGFR if  54.1 (*)     eGFR if non  46.8 (*)     All other components within normal limits   AMMONIA - Abnormal; Notable for the following components:    Ammonia <9 (*)     All other components within normal limits   POCT GLUCOSE - Abnormal; Notable for the following components:    POC Glucose 135 (*)     All other components within normal limits   CULTURE, BLOOD   CULTURE, BLOOD   LACTIC ACID, PLASMA   TROPONIN I   TSH   SARS-COV-2 RNA AMPLIFICATION, QUAL   CK   URINALYSIS   DRUGS OF ABUSE SCREEN, BLOOD   CK   CK-MB   TROPONIN I       CT Head Without Contrast   Final Result      X-Ray Chest AP Portable   Final Result          ASSESSMENT & PLAN:   AMS/Near syncope:  Appears to be back to baseline  Admit to Tele  Neuro checks  CT head negative  1 hour EEG per neurology recommendations  Neurology consult    Symptomatic Bradycardia:  Hold metoprolol  Was held on previous admission, but was restarted at some point  BP stable; continuous cardiac monitoring    HTN:  stable  Continue home medication      CKD 3:  stable    Dementia:  Continue Aricept  Awake and calm at this time. Appears to be back to baseline    Chronic anemia:  H/H is 9.5/29.5 which is slightly lower than previous  No signs of acute bleeding  Follow  H/H    DNR:  Confirmed with son the patient is DNR status      DVT Prophylaxis: continue home anticoagulation for DVT prophylaxis and will be advised to be as mobile as possible and sit in a chair as tolerated.   ________________________________________________________________________________    Discharge Planning and Disposition: No mobility needs. Ambulating well. Patient will be discharged in 1-2 days  Face-to-Face encounter date: 08/09/2020  Encounter included review of the medical records, interviewing and examining the patient face-to-face, discussion with family and other health care providers including emergency medicine physician, admission orders, interpreting lab/test results and formulating a plan of care.   Medical Decision Making during this encounter was  [_] Low Complexity  [_] Moderate Complexity  [x] High Complexity  _________________________________________________________________________________    INPATIENT LIST OF MEDICATIONS     Current Facility-Administered Medications:     acetaminophen tablet 650 mg, 650 mg, Oral, Q4H PRN, Rosalinda Bonilla NP    [START ON 8/10/2020] amLODIPine tablet 10 mg, 10 mg, Oral, Daily, Rosalinda Bonilla NP    apixaban tablet 5 mg, 5 mg, Oral, BID, Rosalinda Bonilla NP    atorvastatin tablet 10 mg, 10 mg, Oral, Nightly, Rosalinda Bonilla NP    docusate sodium capsule 100 mg, 100 mg, Oral, BID, Rosalinda Bonilla NP    [START ON 8/10/2020] donepeziL tablet 10 mg, 10 mg, Oral, Daily, Rosalinda Bonilla NP    [START ON 8/10/2020] escitalopram oxalate tablet 10 mg, 10 mg, Oral, Daily, Rosalinda Bonilla NP    [START ON 8/10/2020] furosemide tablet 20 mg, 20 mg, Oral, Daily, Rosalinda Bonilla NP    [START ON 8/10/2020] isosorbide mononitrate 24 hr tablet 60 mg, 60 mg, Oral, Daily, Rosalinda Bonilla NP    [START ON 8/10/2020] levothyroxine tablet 25 mcg, 25 mcg, Oral, Daily, Rosalinda Bonilla NP    [START ON 8/10/2020] lisinopriL tablet 40 mg, 40 mg,  Oral, Daily, Rosalinda Bonilla NP    mirtazapine tablet 30 mg, 30 mg, Oral, QHS, Rosalinda Bonilla NP    [START ON 8/10/2020] multivitamin tablet 1 tablet, 1 tablet, Oral, Daily, Rosalinda Bonilla NP    ondansetron injection 4 mg, 4 mg, Intravenous, Q6H PRN, Rosalinda Bonilla NP    [START ON 8/10/2020] pantoprazole EC tablet 40 mg, 40 mg, Oral, Daily, Rosalinda Bonilla NP    [START ON 8/10/2020] potassium chloride SA CR tablet 10 mEq, 10 mEq, Oral, Daily, Rosalinda Bonilla NP    sodium chloride 0.9% flush 10 mL, 10 mL, Intravenous, PRN, Rosalinda Bonilla NP    Current Outpatient Medications:     docusate sodium (COLACE) 100 MG capsule, Take 100 mg by mouth 2 (two) times daily., Disp: , Rfl:     amLODIPine (NORVASC) 10 MG tablet, Take 1 tablet by mouth once daily., Disp: , Rfl:     atorvastatin (LIPITOR) 10 MG tablet, 10 mg nightly. , Disp: , Rfl:     donepezil (ARICEPT) 10 MG tablet, once daily. , Disp: , Rfl: 2    ELIQUIS 5 mg Tab, Take 1 tablet by mouth 2 (two) times daily., Disp: , Rfl:     escitalopram oxalate (LEXAPRO) 10 MG tablet, Take 1 tablet by mouth once daily., Disp: , Rfl:     furosemide (LASIX) 20 MG tablet, Take 1 tablet by mouth once daily., Disp: , Rfl:     isosorbide mononitrate (IMDUR) 60 MG 24 hr tablet, Take 1 tablet (60 mg total) by mouth once daily., Disp: 30 tablet, Rfl: 0    levothyroxine (SYNTHROID) 25 MCG tablet, Take 1 tablet by mouth once daily., Disp: , Rfl:     lisinopriL (PRINIVIL,ZESTRIL) 40 MG tablet, Take 1 tablet by mouth once daily., Disp: , Rfl:     metoprolol succinate (TOPROL-XL) 50 MG 24 hr tablet, Take 1 tablet by mouth once daily., Disp: , Rfl:     mirtazapine (REMERON) 30 MG tablet, Take 1 tablet by mouth every evening., Disp: , Rfl:     multivitamin capsule, Take 1 capsule by mouth once daily., Disp: , Rfl:     pantoprazole (PROTONIX) 40 MG tablet, , Disp: , Rfl: 1    potassium chloride SA (K-DUR,KLOR-CON) 10 MEQ tablet, Take 1 tablet by mouth  once daily., Disp: , Rfl:       Scheduled Meds:   [START ON 8/10/2020] amLODIPine  10 mg Oral Daily    apixaban  5 mg Oral BID    atorvastatin  10 mg Oral Nightly    docusate sodium  100 mg Oral BID    [START ON 8/10/2020] donepeziL  10 mg Oral Daily    [START ON 8/10/2020] escitalopram oxalate  10 mg Oral Daily    [START ON 8/10/2020] furosemide  20 mg Oral Daily    [START ON 8/10/2020] isosorbide mononitrate  60 mg Oral Daily    [START ON 8/10/2020] levothyroxine  25 mcg Oral Daily    [START ON 8/10/2020] lisinopriL  40 mg Oral Daily    mirtazapine  30 mg Oral QHS    [START ON 8/10/2020] multivitamin  1 tablet Oral Daily    [START ON 8/10/2020] pantoprazole  40 mg Oral Daily    [START ON 8/10/2020] potassium chloride SA  10 mEq Oral Daily     Continuous Infusions:  PRN Meds:.acetaminophen, ondansetron, sodium chloride 0.9%      Rosalinda Bonilla  [facility] Hospitalist  08/09/2020

## 2020-08-11 VITALS
BODY MASS INDEX: 25.01 KG/M2 | WEIGHT: 168.88 LBS | RESPIRATION RATE: 18 BRPM | TEMPERATURE: 99 F | SYSTOLIC BLOOD PRESSURE: 170 MMHG | DIASTOLIC BLOOD PRESSURE: 84 MMHG | HEART RATE: 64 BPM | HEIGHT: 69 IN | OXYGEN SATURATION: 99 %

## 2020-08-11 PROBLEM — R55 SYNCOPE: Status: RESOLVED | Noted: 2020-07-09 | Resolved: 2020-08-11

## 2020-08-11 PROBLEM — R00.1 SYMPTOMATIC BRADYCARDIA: Status: RESOLVED | Noted: 2020-07-09 | Resolved: 2020-08-11

## 2020-08-11 PROBLEM — G93.40 ACUTE ENCEPHALOPATHY: Status: RESOLVED | Noted: 2020-08-10 | Resolved: 2020-08-11

## 2020-08-11 LAB
ALBUMIN SERPL BCP-MCNC: 3.1 G/DL (ref 3.5–5.2)
ALP SERPL-CCNC: 59 U/L (ref 55–135)
ALT SERPL W/O P-5'-P-CCNC: 15 U/L (ref 10–44)
ANION GAP SERPL CALC-SCNC: 11 MMOL/L (ref 8–16)
AST SERPL-CCNC: 18 U/L (ref 10–40)
BILIRUB SERPL-MCNC: 0.5 MG/DL (ref 0.1–1)
BUN SERPL-MCNC: 28 MG/DL (ref 8–23)
CALCIUM SERPL-MCNC: 8.6 MG/DL (ref 8.7–10.5)
CHLORIDE SERPL-SCNC: 107 MMOL/L (ref 95–110)
CO2 SERPL-SCNC: 24 MMOL/L (ref 23–29)
CREAT SERPL-MCNC: 1.4 MG/DL (ref 0.5–1.4)
ERYTHROCYTE [DISTWIDTH] IN BLOOD BY AUTOMATED COUNT: 13.9 % (ref 11.5–14.5)
EST. GFR  (AFRICAN AMERICAN): 54.1 ML/MIN/1.73 M^2
EST. GFR  (NON AFRICAN AMERICAN): 46.8 ML/MIN/1.73 M^2
FOLATE SERPL-MCNC: 11.3 NG/ML (ref 4–24)
GLUCOSE SERPL-MCNC: 140 MG/DL (ref 70–110)
HCT VFR BLD AUTO: 30.1 % (ref 40–54)
HGB BLD-MCNC: 10 G/DL (ref 14–18)
MCH RBC QN AUTO: 34.1 PG (ref 27–31)
MCHC RBC AUTO-ENTMCNC: 33.2 G/DL (ref 32–36)
MCV RBC AUTO: 103 FL (ref 82–98)
PLATELET # BLD AUTO: 250 K/UL (ref 150–350)
PMV BLD AUTO: 10.1 FL (ref 9.2–12.9)
POTASSIUM SERPL-SCNC: 3.4 MMOL/L (ref 3.5–5.1)
PROT SERPL-MCNC: 6.3 G/DL (ref 6–8.4)
RBC # BLD AUTO: 2.93 M/UL (ref 4.6–6.2)
SODIUM SERPL-SCNC: 142 MMOL/L (ref 136–145)
WBC # BLD AUTO: 5.41 K/UL (ref 3.9–12.7)

## 2020-08-11 PROCEDURE — 25000003 PHARM REV CODE 250: Performed by: INTERNAL MEDICINE

## 2020-08-11 PROCEDURE — 36415 COLL VENOUS BLD VENIPUNCTURE: CPT

## 2020-08-11 PROCEDURE — 25000003 PHARM REV CODE 250: Performed by: NURSE PRACTITIONER

## 2020-08-11 PROCEDURE — G0378 HOSPITAL OBSERVATION PER HR: HCPCS

## 2020-08-11 PROCEDURE — 84207 ASSAY OF VITAMIN B-6: CPT

## 2020-08-11 PROCEDURE — 85027 COMPLETE CBC AUTOMATED: CPT

## 2020-08-11 PROCEDURE — 82746 ASSAY OF FOLIC ACID SERUM: CPT

## 2020-08-11 PROCEDURE — 80053 COMPREHEN METABOLIC PANEL: CPT

## 2020-08-11 RX ORDER — CLONIDINE HYDROCHLORIDE 0.1 MG/1
0.1 TABLET ORAL 3 TIMES DAILY PRN
Qty: 30 TABLET | Refills: 0 | Status: SHIPPED | OUTPATIENT
Start: 2020-08-11 | End: 2020-09-10

## 2020-08-11 RX ORDER — HYDROCHLOROTHIAZIDE 25 MG/1
25 TABLET ORAL DAILY
Status: DISCONTINUED | OUTPATIENT
Start: 2020-08-11 | End: 2020-08-11 | Stop reason: HOSPADM

## 2020-08-11 RX ORDER — HYDROCHLOROTHIAZIDE 25 MG/1
25 TABLET ORAL DAILY
Qty: 30 TABLET | Refills: 0 | Status: ON HOLD | OUTPATIENT
Start: 2020-08-12 | End: 2020-08-26 | Stop reason: HOSPADM

## 2020-08-11 RX ADMIN — APIXABAN 5 MG: 5 TABLET, FILM COATED ORAL at 10:08

## 2020-08-11 RX ADMIN — ESCITALOPRAM OXALATE 10 MG: 10 TABLET ORAL at 10:08

## 2020-08-11 RX ADMIN — THERA TABS 1 TABLET: TAB at 10:08

## 2020-08-11 RX ADMIN — HYDROCHLOROTHIAZIDE 25 MG: 25 TABLET ORAL at 12:08

## 2020-08-11 RX ADMIN — DOCUSATE SODIUM 100 MG: 100 CAPSULE, LIQUID FILLED ORAL at 10:08

## 2020-08-11 RX ADMIN — FUROSEMIDE 20 MG: 20 TABLET ORAL at 10:08

## 2020-08-11 RX ADMIN — PANTOPRAZOLE SODIUM 40 MG: 40 TABLET, DELAYED RELEASE ORAL at 10:08

## 2020-08-11 RX ADMIN — ISOSORBIDE MONONITRATE 60 MG: 30 TABLET, EXTENDED RELEASE ORAL at 10:08

## 2020-08-11 RX ADMIN — LISINOPRIL 40 MG: 20 TABLET ORAL at 10:08

## 2020-08-11 RX ADMIN — DONEPEZIL HYDROCHLORIDE 10 MG: 5 TABLET, FILM COATED ORAL at 10:08

## 2020-08-11 RX ADMIN — POTASSIUM CHLORIDE 10 MEQ: 750 CAPSULE, EXTENDED RELEASE ORAL at 10:08

## 2020-08-11 NOTE — PLAN OF CARE
08/11/20 1515   Final Note   Assessment Type Final Discharge Note   Anticipated Discharge Disposition Home-Health   Post-Acute Status   Post-Acute Authorization Home Health   Home Health Status Set-up Complete   Patient choice form signed by patient/caregiver List with quality metrics by geographic area provided  (Signed by Pt's son, Tyler Chavez, at 1414.)   Discharge Delays None known at this time     SW met with Pt, as well as son when he arrived to take Pt home, in order to discuss HH orders.  SW explained HH process with N, and Pt's son verbalized understanding.  After reviewing [facility] list, he had no preference and verbalized agreement to use the agency arranged by [facility] staff.  Referral and orders sent to N via Epic.  SW received message from Nina with N ([phone]), stating that Pt was arranged with [facility].  No further needs addressed at this time.

## 2020-08-11 NOTE — SUBJECTIVE & OBJECTIVE
Interval History:     Review of Systems   Unable to perform ROS: Dementia     Objective:     Vital Signs (Most Recent):  Temp: 97.5 °F (36.4 °C) (08/10/20 1918)  Pulse: (!) 46 (08/10/20 1918)  Resp: 18 (08/10/20 1918)  BP: (!) 165/93 (08/10/20 1918)  SpO2: 99 % (08/10/20 1918) Vital Signs (24h Range):  Temp:  [97.4 °F (36.3 °C)-98.2 °F (36.8 °C)] 97.5 °F (36.4 °C)  Pulse:  [45-56] 46  Resp:  [16-19] 18  SpO2:  [97 %-99 %] 99 %  BP: (133-192)/(59-95) 165/93     Weight: 78.4 kg (172 lb 13.5 oz)  Body mass index is 25.52 kg/m².    Intake/Output Summary (Last 24 hours) at 8/10/2020 2148  Last data filed at 8/10/2020 1333  Gross per 24 hour   Intake 240 ml   Output --   Net 240 ml      Physical Exam  Vitals signs and nursing note reviewed.   Constitutional:       Appearance: He is well-developed.   HENT:      Head: Normocephalic and atraumatic.      Nose: Nose normal.      Mouth/Throat:      Mouth: Mucous membranes are moist.   Eyes:      Pupils: Pupils are equal, round, and reactive to light.   Neck:      Musculoskeletal: Full passive range of motion without pain and normal range of motion.   Cardiovascular:      Rate and Rhythm: Normal rate and regular rhythm.   Pulmonary:      Effort: Pulmonary effort is normal.      Breath sounds: Normal breath sounds.   Abdominal:      General: Bowel sounds are normal.      Palpations: Abdomen is soft.   Musculoskeletal: Normal range of motion.   Skin:     General: Skin is warm.   Neurological:      General: No focal deficit present.      Mental Status: He is alert.   Psychiatric:         Behavior: Behavior normal.         Significant Labs:   CBC:   Recent Labs   Lab 08/09/20  1850 08/10/20  0127   WBC 6.52 5.25   HGB 9.5* 9.9*   HCT 29.5* 30.3*    249     CMP:   Recent Labs   Lab 08/09/20  1850 08/10/20  0127    142   K 3.9 3.7    108   CO2 25 24   * 106   BUN 25* 25*   CREATININE 1.4 1.3   CALCIUM 8.7 8.5*   PROT 6.4 5.8*   ALBUMIN 3.3* 3.1*   BILITOT 0.7  0.6   ALKPHOS 54* 54*   AST 20 18   ALT 17 15   ANIONGAP 9 10   EGFRNONAA 46.8* 51.2*       Significant Imaging: I have reviewed all pertinent imaging results/findings within the past 24 hours.

## 2020-08-11 NOTE — PROGRESS NOTES
[facility]  Neurology  Consult Note    Patient Name: Tomasz Chavez  MRN: [id number]  Admission Date: 8/9/2020  Hospital Length of Stay: 0 days  Code Status: Prior   Attending Provider: No att. providers found   Consulting NP: Nadine Nolan NP  Consulting Provider: Dr. Lane Cameron MD  Primary Care Physician: Kendrick Hatfield MD  Principal Problem:Acute encephalopathy    Consults  Subjective:     Chief Complaint:   Chief Complaint   Patient presents with    Decreased LOC       HPI: Tomasz Chavez is a 79 y.o. White male who  has a past medical history of Dementia, GERD (gastroesophageal reflux disease), Hypertension, Sleep apnea, and Wears glasses.. The patient presented to [facility] on 8/9/2020 with a primary complaint of Decreased LOC    History was obtained from the patient, the family on the phone and ER physician Sign-out. Patient presents to the ED via ambulance for decreased level of consciousness/AMS. The patient's son states that the patient ate dinner this evening and went to sit outside at about 5:30. 15 minutes later, the patient put his head down and appeared to have dozed off to sleep. His son states that the patient was drooling, but he was still breathing. He was difficult to arouse. The family thought the patient was over heated, so they sprayed cool water on his legs and feet, and the patient moved his legs in response, but did not open his eyes. No jerking or shaking movements were noted. EMS was called and the patient was brought to the ED for evaluation. While in the ED, the patient becomes more alert. He denies any complaints. He denies having any pain or SOB.      In the emergency room, patient is found to be bradycardic with HR 40s. Other vitals stable.  CBC with H/H 9.5/29.5. CMP with creat 1.4/bun 25. Troponin within reference range. I have reviewed the EKG and does not show new ischemic changes. No concerning finding on chest x ray. CT head  negative.    Neurology Consult Note: Patient seen and examined with Dr. Lane Cameron. Discussed plan of care. No family at bedside. Patient is 79 year old White male with PMHX: Dementia , GERD, Hypertension, Sleep apnea, thyroid disease, and wears glasses. The patient presented to [facility] with Decreased LOC. According to medical record the family reported that the patient ate dinner 8/9/2020 at 5:30 then 15 mintues the patient put his head down and went to sleep but he was difficult to arouses. The patient was reportedly drooling but still breathing. The patient became more alert in ED. Upon exam patient arouses easily from sleep open eyes to sound. Patient unable to recall events that brought him to the hospital, the patient states that he thought he was at home. Patient is disoriented to time, place, and situation. Patient hard of hearing, patient able to follow simple commands, MAEW, patient is able to ambulate. Patient unable to recall name of familiar objects, patient denies visual disturbance, denies dizziness, nausea, denies pain. Patient CT of head negative, Patient had CUS 7/9 less than 50 % stenosis. Will order MRI brain w/o contrast, MRA brain, Tte limited w bubble study. Will rule out acute process. Will continue to follow. Recommend no driving.        8/11:  Patient bed watching TV.  He reports that he did eat some food today.  He did ambulate around the son.  Patient has limited responses to verbal questioning.  He denies pain.  He follows simple commands.  He has a blunted affect.  Sitter at bedside.    Past Medical History:   Diagnosis Date    Dementia     GERD (gastroesophageal reflux disease)     Hypertension     Sleep apnea     does not use machine    Wears glasses        Past Surgical History:   Procedure Laterality Date    APPENDECTOMY      CHOLECYSTECTOMY      FINGER SURGERY      laceration    HERNIA REPAIR         Review of patient's allergies indicates:   Allergen Reactions    Hydralazine  analogues        Current Neurological Medications:     No current facility-administered medications on file prior to encounter.      Current Outpatient Medications on File Prior to Encounter   Medication Sig    docusate sodium (COLACE) 100 MG capsule Take 100 mg by mouth 2 (two) times daily.    atorvastatin (LIPITOR) 10 MG tablet 10 mg nightly.     donepezil (ARICEPT) 10 MG tablet once daily.     ELIQUIS 5 mg Tab Take 1 tablet by mouth 2 (two) times daily.    escitalopram oxalate (LEXAPRO) 10 MG tablet Take 1 tablet by mouth once daily.    furosemide (LASIX) 20 MG tablet Take 1 tablet by mouth once daily.    isosorbide mononitrate (IMDUR) 60 MG 24 hr tablet Take 1 tablet (60 mg total) by mouth once daily.    levothyroxine (SYNTHROID) 25 MCG tablet Take 1 tablet by mouth once daily.    lisinopriL (PRINIVIL,ZESTRIL) 40 MG tablet Take 1 tablet by mouth once daily.    mirtazapine (REMERON) 30 MG tablet Take 1 tablet by mouth every evening.    multivitamin capsule Take 1 capsule by mouth once daily.    pantoprazole (PROTONIX) 40 MG tablet     potassium chloride SA (K-DUR,KLOR-CON) 10 MEQ tablet Take 1 tablet by mouth once daily.    [DISCONTINUED] amLODIPine (NORVASC) 10 MG tablet Take 1 tablet by mouth once daily.    [DISCONTINUED] metoprolol succinate (TOPROL-XL) 50 MG 24 hr tablet Take 1 tablet by mouth once daily.      Family History     None        Tobacco Use    Smoking status: Former Smoker     Quit date:      Years since quittin.6   Substance and Sexual Activity    Alcohol use: No    Drug use: No    Sexual activity: Not on file     Review of Systems   Unable to perform ROS: Dementia   Constitutional: Negative.    HENT: Positive for hearing loss.    Eyes: Negative.    Respiratory: Negative.    Cardiovascular: Negative.    Gastrointestinal: Negative.    Endocrine: Negative.    Genitourinary: Negative.    Musculoskeletal: Negative.    Allergic/Immunologic: Negative.    Neurological:  Positive for syncope.   Hematological: Negative.    Psychiatric/Behavioral: Positive for confusion and decreased concentration.     Objective:     Vital Signs (Most Recent):  Temp: 98.7 °F (37.1 °C) (08/11/20 0740)  Pulse: 64 (08/11/20 0740)  Resp: 18 (08/11/20 0740)  BP: (!) 170/84 (08/11/20 0740)  SpO2: 99 % (08/10/20 2315) Vital Signs (24h Range):  Temp:  [97.5 °F (36.4 °C)-98.7 °F (37.1 °C)] 98.7 °F (37.1 °C)  Pulse:  [46-64] 64  Resp:  [17-20] 18  SpO2:  [98 %-99 %] 99 %  BP: (165-178)/(81-93) 170/84     Weight: 76.6 kg (168 lb 14 oz)  Body mass index is 24.94 kg/m².    Physical Exam  Constitutional:       General: He is awake.      Appearance: Normal appearance. He is well-developed.   HENT:      Head: Normocephalic and atraumatic.      Mouth/Throat:      Mouth: Mucous membranes are moist.      Pharynx: Oropharynx is clear.   Eyes:      General: Lids are normal. Vision grossly intact. No visual field deficit.     Extraocular Movements: Extraocular movements intact.      Right eye: Normal extraocular motion.      Left eye: Normal extraocular motion.      Conjunctiva/sclera: Conjunctivae normal.      Pupils: Pupils are equal, round, and reactive to light.   Neck:      Musculoskeletal: Full passive range of motion without pain and normal range of motion.   Cardiovascular:      Rate and Rhythm: Normal rate and regular rhythm.      Pulses: Normal pulses.      Heart sounds: Normal heart sounds.   Pulmonary:      Effort: Pulmonary effort is normal.      Breath sounds: Normal breath sounds.   Abdominal:      General: Abdomen is flat. Bowel sounds are normal.   Musculoskeletal: Normal range of motion.   Skin:     General: Skin is warm and dry.      Capillary Refill: Capillary refill takes less than 2 seconds.      Findings: Bruising present.   Neurological:      Mental Status: He is alert. He is disoriented.      GCS: GCS eye subscore is 4 - spontaneous. GCS verbal subscore is 4 - confused. GCS motor subscore is 6 -  obeys commands.      Cranial Nerves: No dysarthria or facial asymmetry.      Sensory: Sensory deficit present.      Motor: No weakness, tremor, atrophy, abnormal muscle tone, seizure activity or pronator drift.      Coordination: Coordination normal. Finger-Nose-Finger Test normal.      Deep Tendon Reflexes:      Reflex Scores:       Tricep reflexes are 2+ on the right side and 2+ on the left side.       Bicep reflexes are 2+ on the right side and 2+ on the left side.       Brachioradialis reflexes are 2+ on the right side and 2+ on the left side.       Patellar reflexes are 2+ on the right side and 2+ on the left side.       Achilles reflexes are 2+ on the right side and 2+ on the left side.     Comments: Oriented to person only, disoriented to time, place and situation. Patient has dementia at base, hearing impaired   Psychiatric:         Attention and Perception: He is inattentive.         Mood and Affect: Affect is flat.         Speech: Speech normal.         Behavior: Behavior is cooperative.         Cognition and Memory: Cognition is impaired. Memory is impaired.      Comments: Patient withdrawn and apprehensive          NEUROLOGICAL EXAMINATION:     MENTAL STATUS   Oriented to person.   Disoriented to place. Disoriented to country, city and area.   Disoriented to year.   Follows 2 step commands.   Attention: normal. Concentration: decreased.   Speech: speech is normal   Level of consciousness: arousable by verbal stimuli    CRANIAL NERVES     CN II   Visual acuity: normal  Right visual field deficit: none  Left visual field deficit: none     CN III, IV, VI   Pupils are equal, round, and reactive to light.  Right pupil: Size: 3 mm. Shape: regular. Reactivity: brisk. Consensual response: intact.   Left pupil: Size: 3 mm. Shape: regular. Reactivity: brisk. Consensual response: intact.     CN V   Facial sensation intact.     CN VII   Facial expression full, symmetric.     CN VIII   Hearing: impaired    CN XI   CN  XI normal.     CN XII   CN XII normal.     MOTOR EXAM   Muscle bulk: normal  Overall muscle tone: normal  Right arm tone: normal  Left arm tone: normal  Right arm pronator drift: absent  Left arm pronator drift: absent  Right leg tone: normal  Left leg tone: normal    REFLEXES     Reflexes   Right brachioradialis: 2+  Left brachioradialis: 2+  Right biceps: 2+  Left biceps: 2+  Right triceps: 2+  Left triceps: 2+  Right patellar: 2+  Left patellar: 2+  Right achilles: 2+  Left achilles: 2+  Right plantar: normal  Left plantar: normal    GAIT AND COORDINATION      Coordination   Finger to nose coordination: normal    Tremor   Resting tremor: absent  Intention tremor: absent  Action tremor: absent       MAINOR    NIH Stroke Scale:    Level of Consciousness: 0 - alert  LOC Questions: 1 - answers one correctly  LOC Commands: 1 - performs one correctly  Best Gaze: 0 - normal  Visual: 0 - no visual loss  Facial Palsy: 0 - normal  Motor Left Arm: 0 - no drift  Motor Right Arm: 0 - no drift  Motor Left Le - no drift  Motor Right Le - no drift  Limb Ataxia: 0 - absent  Sensory: 0 - normal  Best Language: 0 - no aphasia  Dysarthria: 0 - normal articulation  Extinction and Inattention: 0 - no neglect  NIH Stroke Scale Total: 2  Jessica Coma Scale:  Best Eye Response: 4 - spontaneous  Best Motor Response: 6 - obeys commands  Best Verbal Response: 4 - confused  [place] Coma Scale Total: 14      Significant Labs:   Results for orders placed or performed during the hospital encounter of 20   Blood culture #1 **CANNOT BE ORDERED STAT**    Specimen: Peripheral, Wrist, Left; Blood   Result Value Ref Range    Blood Culture, Routine No Growth to date     Blood Culture, Routine No Growth to date    Blood culture #2 **CANNOT BE ORDERED STAT**    Specimen: Peripheral, Antecubital, Left; Blood   Result Value Ref Range    Blood Culture, Routine No Growth to date     Blood Culture, Routine No Growth to date    CBC auto  differential   Result Value Ref Range    WBC 6.52 3.90 - 12.70 K/uL    RBC 2.87 (L) 4.60 - 6.20 M/uL    Hemoglobin 9.5 (L) 14.0 - 18.0 g/dL    Hematocrit 29.5 (L) 40.0 - 54.0 %    Mean Corpuscular Volume 103 (H) 82 - 98 fL    Mean Corpuscular Hemoglobin 33.1 (H) 27.0 - 31.0 pg    Mean Corpuscular Hemoglobin Conc 32.2 32.0 - 36.0 g/dL    RDW 13.9 11.5 - 14.5 %    Platelets 293 150 - 350 K/uL    MPV 9.9 9.2 - 12.9 fL    Immature Granulocytes 0.2 0.0 - 0.5 %    Gran # (ANC) 4.5 1.8 - 7.7 K/uL    Immature Grans (Abs) 0.01 0.00 - 0.04 K/uL    Lymph # 1.6 1.0 - 4.8 K/uL    Mono # 0.2 (L) 0.3 - 1.0 K/uL    Eos # 0.2 0.0 - 0.5 K/uL    Baso # 0.02 0.00 - 0.20 K/uL    nRBC 0 0 /100 WBC    Gran% 69.5 38.0 - 73.0 %    Lymph% 23.8 18.0 - 48.0 %    Mono% 3.7 (L) 4.0 - 15.0 %    Eosinophil% 2.5 0.0 - 8.0 %    Basophil% 0.3 0.0 - 1.9 %    Differential Method Automated    Comprehensive metabolic panel   Result Value Ref Range    Sodium 142 136 - 145 mmol/L    Potassium 3.9 3.5 - 5.1 mmol/L    Chloride 108 95 - 110 mmol/L    CO2 25 23 - 29 mmol/L    Glucose 136 (H) 70 - 110 mg/dL    BUN, Bld 25 (H) 8 - 23 mg/dL    Creatinine 1.4 0.5 - 1.4 mg/dL    Calcium 8.7 8.7 - 10.5 mg/dL    Total Protein 6.4 6.0 - 8.4 g/dL    Albumin 3.3 (L) 3.5 - 5.2 g/dL    Total Bilirubin 0.7 0.1 - 1.0 mg/dL    Alkaline Phosphatase 54 (L) 55 - 135 U/L    AST 20 10 - 40 U/L    ALT 17 10 - 44 U/L    Anion Gap 9 8 - 16 mmol/L    eGFR if African American 54.1 (A) >60 mL/min/1.73 m^2    eGFR if non  46.8 (A) >60 mL/min/1.73 m^2   Lactic acid, plasma   Result Value Ref Range    Lactate (Lactic Acid) 1.2 0.5 - 1.9 mmol/L   Troponin I   Result Value Ref Range    Troponin I <0.030 <=0.040 ng/mL   TSH   Result Value Ref Range    TSH 3.760 0.340 - 5.600 uIU/mL   Ammonia   Result Value Ref Range    Ammonia <9 (A) 10 - 50 umol/L   COVID-19 Rapid Screening   Result Value Ref Range    SARS-CoV-2 RNA, Amplification, Qual Negative Negative   CK   Result Value  Ref Range    CPK 43 20 - 200 U/L   CK-MB   Result Value Ref Range    CPK MB 2.0 0.1 - 6.5 ng/mL   Troponin I   Result Value Ref Range    Troponin I <0.030 <=0.040 ng/mL   Comprehensive metabolic panel   Result Value Ref Range    Sodium 142 136 - 145 mmol/L    Potassium 3.7 3.5 - 5.1 mmol/L    Chloride 108 95 - 110 mmol/L    CO2 24 23 - 29 mmol/L    Glucose 106 70 - 110 mg/dL    BUN, Bld 25 (H) 8 - 23 mg/dL    Creatinine 1.3 0.5 - 1.4 mg/dL    Calcium 8.5 (L) 8.7 - 10.5 mg/dL    Total Protein 5.8 (L) 6.0 - 8.4 g/dL    Albumin 3.1 (L) 3.5 - 5.2 g/dL    Total Bilirubin 0.6 0.1 - 1.0 mg/dL    Alkaline Phosphatase 54 (L) 55 - 135 U/L    AST 18 10 - 40 U/L    ALT 15 10 - 44 U/L    Anion Gap 10 8 - 16 mmol/L    eGFR if African American 59.2 (A) >60 mL/min/1.73 m^2    eGFR if non  51.2 (A) >60 mL/min/1.73 m^2   Lipid panel   Result Value Ref Range    Cholesterol 117 (L) 120 - 199 mg/dL    Triglycerides 35 30 - 150 mg/dL    HDL 35 (L) 40 - 75 mg/dL    LDL Cholesterol 75.0 63.0 - 159.0 mg/dL    Hdl/Cholesterol Ratio 29.9 20.0 - 50.0 %    Total Cholesterol/HDL Ratio 3.3 2.0 - 5.0    Non-HDL Cholesterol 82 mg/dL   Magnesium   Result Value Ref Range    Magnesium 2.0 1.6 - 2.6 mg/dL   CBC auto differential   Result Value Ref Range    WBC 5.25 3.90 - 12.70 K/uL    RBC 2.94 (L) 4.60 - 6.20 M/uL    Hemoglobin 9.9 (L) 14.0 - 18.0 g/dL    Hematocrit 30.3 (L) 40.0 - 54.0 %    Mean Corpuscular Volume 103 (H) 82 - 98 fL    Mean Corpuscular Hemoglobin 33.7 (H) 27.0 - 31.0 pg    Mean Corpuscular Hemoglobin Conc 32.7 32.0 - 36.0 g/dL    RDW 13.7 11.5 - 14.5 %    Platelets 249 150 - 350 K/uL    MPV 10.2 9.2 - 12.9 fL    Immature Granulocytes 0.2 0.0 - 0.5 %    Gran # (ANC) 3.9 1.8 - 7.7 K/uL    Immature Grans (Abs) 0.01 0.00 - 0.04 K/uL    Lymph # 1.1 1.0 - 4.8 K/uL    Mono # 0.2 (L) 0.3 - 1.0 K/uL    Eos # 0.0 0.0 - 0.5 K/uL    Baso # 0.00 0.00 - 0.20 K/uL    nRBC 0 0 /100 WBC    Gran% 74.2 (H) 38.0 - 73.0 %    Lymph% 20.6  18.0 - 48.0 %    Mono% 4.2 4.0 - 15.0 %    Eosinophil% 0.8 0.0 - 8.0 %    Basophil% 0.0 0.0 - 1.9 %    Differential Method Automated    Vitamin B12   Result Value Ref Range    Vitamin B-12 185 (L) 210 - 950 pg/mL   RPR   Result Value Ref Range    RPR Non-reactive Non-reactive   CBC Without Differential   Result Value Ref Range    WBC 5.41 3.90 - 12.70 K/uL    RBC 2.93 (L) 4.60 - 6.20 M/uL    Hemoglobin 10.0 (L) 14.0 - 18.0 g/dL    Hematocrit 30.1 (L) 40.0 - 54.0 %    Mean Corpuscular Volume 103 (H) 82 - 98 fL    Mean Corpuscular Hemoglobin 34.1 (H) 27.0 - 31.0 pg    Mean Corpuscular Hemoglobin Conc 33.2 32.0 - 36.0 g/dL    RDW 13.9 11.5 - 14.5 %    Platelets 250 150 - 350 K/uL    MPV 10.1 9.2 - 12.9 fL   Comprehensive metabolic panel   Result Value Ref Range    Sodium 142 136 - 145 mmol/L    Potassium 3.4 (L) 3.5 - 5.1 mmol/L    Chloride 107 95 - 110 mmol/L    CO2 24 23 - 29 mmol/L    Glucose 140 (H) 70 - 110 mg/dL    BUN, Bld 28 (H) 8 - 23 mg/dL    Creatinine 1.4 0.5 - 1.4 mg/dL    Calcium 8.6 (L) 8.7 - 10.5 mg/dL    Total Protein 6.3 6.0 - 8.4 g/dL    Albumin 3.1 (L) 3.5 - 5.2 g/dL    Total Bilirubin 0.5 0.1 - 1.0 mg/dL    Alkaline Phosphatase 59 55 - 135 U/L    AST 18 10 - 40 U/L    ALT 15 10 - 44 U/L    Anion Gap 11 8 - 16 mmol/L    eGFR if African American 54.1 (A) >60 mL/min/1.73 m^2    eGFR if non  46.8 (A) >60 mL/min/1.73 m^2   Folate   Result Value Ref Range    Folate 11.3 4.0 - 24.0 ng/mL   Echo Color Flow Doppler? No; Limited Follow-Up Exam? Yes; Bubble Contrast? Yes   Result Value Ref Range    BSA 1.95 m2   POCT glucose   Result Value Ref Range    POC Glucose 135 (H) 70 - 110         Significant Imaging:   Narrative & Impression     CMS MANDATED QUALITY DATA - CT RADIATION 436. CT scans at this  facility utilize dose modulation, iterative reconstruction, and/or  weight based dosing when appropriate to reduce radiation dose to as  low as reasonably achievable.     PROCEDURE:    CT HEAD  WITHOUT CONTRAST  dated  8/9/2020 7:26 PM     CLINICAL HISTORY:   Male 79 years of age.   Altered mental status     TECHNIQUE: CT images were acquired from the foramen magnum to the  vertex. Intravenous contrast was not administered.     COMPARISON: July 9, 2020     FINDINGS:     There is no intracranial hemorrhage, extra-axial fluid collection or  edema. Ventricles, cisterns and sulci are age-appropriate. Bilateral  white matter low-attenuation is compatible with mild chronic small  vessel ischemic disease. There is global mild atrophy for age. Mastoid  air cells are clear. There are mucous retention cysts within the floor  of each maxillary sinus.  Extracranial soft tissue is normal. The calvarium is intact.     IMPRESSION:        1. No acute findings.  2. Global atrophy.  3. No change compared with the prior study     Electronically Signed by Breanne Reese on 8/9/2020 7:59 PM           Narrative & Impression     EXAM DESCRIPTION:  US CAROTID BILATERAL     CLINICAL HISTORY:  79 years  Male;  syncope confusion. History of dementia.     TECHNIQUE:  Grayscale and Doppler (color and pulse) ultrasound of bilateral carotid arteries and the vertebral arteries was performed. Stenosis assessment based on Carotid Artery Stenosis: Gray-Scale and Doppler US DiagnosisSociety of Radiologists in Ultrasound Consensus Conference;  Radiology, Nov 2003, Vol. 229:088203     COMPARISON: None     FINDINGS:     All velocities in cm/sec.     Right carotid:     CCA PSV: 37.7 cm/s  Proximal ICA velocities: 46.8 cm/s (Normal < 124/40)  Proximal ICA EDV: 10.3  cm/s  Mid ICA PSV: 46.8 cm/s  Distal ICA PSV: 45.5 cm/s  ICA/CCA PSV ratio: 1.2 (Normal < 2.0)  ECA: 45.5 cm/s  Right vertebral: Antegrade flow  Comment: Minimal plaque. Visually there is no stenosis. Color and spectral waveforms are normal. Heart rate was mildly irregular.     Left carotid:  CCA PSV: 37.7 cm/s  ICA velocities: 21.1  cm/s(Normal < 124/40)  Proximal ICA EDV: 6.4  cm/s  Mid ICA PSV: 24.6 cm/s  Distal ICA PSV: 36.4 cm/s  ICA/CCA PSV ratio: 1.0 (Normal < 2.0)  ECA: 46.8 cm/s  Left vertebral: Antegrade flow  Comment: Heart rate is mildly irregular. Calcified plaque is seen at the bulb. There is no stenosis. Color and spectral waveforms are within normal limits.     IMPRESSION:     Less than 50% stenosis of the internal carotid arteries bilaterally.     Irregular heart rate.           Electronically signed by:  Michaelle Em MD  7/10/2020 12:38 AM CDT Workstation: [id number]     · Concentric left ventricular hypertrophy.  · Normal left ventricular systolic function. The estimated ejection fraction is 65%.  · Normal LV diastolic function.  · Normal right ventricular systolic function.  · The aortic root is mildly dilated. Trace aortic regurgitation.  · Mild mitral regurgitation.  · Mild pulmonic regurgitation.      Study Details    A complete echo was performed using complete 2D, color flow Doppler and spectral Doppler. This was a portable study performed at the patient's bedside.   Echocardiography Findings    Left Ventricle Normal ejection fraction at 65%.  Normal left ventricular cavity size. Concentric hypertrophy observed. Normal left ventricular diastolic function.   Right Ventricle Normal cavity size and systolic function.   Left Atrium The left atrial volume index is normal.   Right Atrium There is normal right atrial size.   Aortic Valve Aortic valve sclerosis is mild. There is no aortic stenosis. Trace regurgitation.   Mitral Valve Mild regurgitation.   Tricuspid Valve Trace regurgitation.   Pulmonic Valve Mild regurgitation.   IVC/SVC Inferior vena cava is not well visualized. Inferior vena cava not visualized.   Ascending Aorta The aortic root is mildly dilated.         Assessment and Plan:  79 year old White male with impression:  1. Acute Encephalopathy Rule out acute Stroke/ Rule out Seizures   2. Near Syncope   CT of head negative  -CUS 7/9 less than 50 % stenosis.    - MRI brain w/o contrast ordered  - MRA brain ordered   -TTE limited w bubble study ordered  -EEG 1 hour ordered  -Encephalopathy labs ordered  TSH 3.760  - LDL 75  -CR 1.5  -B12 185  -Ammonia <9  -COVID-19 negative   UDS pending    Recommend no driving.   3. Symptomatic Bradycardia   -Internal MD to manage   4. HTN  -Internal MD to manage   5. CKD   -Internal MD to manage   6. Dementia   -Internal MD to manage   7. Chronic Anemia   -Internal MD to manage  8. Thyroid Disease   -Internal MD to manageTSH 3.760      PLAN: start B12 supplements for b12 deficiency.        Patient to [facility] with [facility] at [phone] within 3 days from discharge.    Stroke education was provided including stroke risk factors modification and any acute neurological changes including weakness, confusion, visual changes to come straight to the ER.  Seizure education was provided including no driving, no swimming by self, no operation of heavy machinery or climbing on ladders.     All side effects of new medications were discussed with patient and/or next of kin and all questions were answered.               Active Diagnoses:    Diagnosis Date Noted POA    DNR (do not resuscitate) [Z66] 08/10/2020 Unknown    Dementia [F03.90] 07/09/2020 Yes      Problems Resolved During this Admission:    Diagnosis Date Noted Date Resolved POA    PRINCIPAL PROBLEM:  Acute encephalopathy [G93.40] 08/10/2020 08/11/2020 Unknown    Symptomatic bradycardia [R00.1] 07/09/2020 08/11/2020 Yes    Syncope [R55] 07/09/2020 08/11/2020 Yes       VTE Risk Mitigation (From admission, onward)         Ordered     IP VTE HIGH RISK PATIENT  Once      08/09/20 0816                Thank you for your consult. I will follow-up with patient. Please contact us if you have any additional questions.    Nadine Nolan, RIAZ  Neurology  On license of [facility]

## 2020-08-11 NOTE — HOSPITAL COURSE
Patient admitted with acute on chronic encephalopathy/Syncope in the background of advanced dementia  Patient was seen by Neuro Team  Patient was admitted last month with same c/o  Last time and This time he had extensive investigations which were all normal  Last admission pt was advised to discontinue Toprol because of bradycardia  Patient was found to have taking toprol again upon this admission and had baseline bradycardia  Toprol and Amlodipine were discontinued and he got discharged to home with HH nurse/aid  Pts code status is DNR.Ideally pt should be under Hospice care

## 2020-08-11 NOTE — PROGRESS NOTES
[facility] Medicine  Progress Note    Patient Name: Tomasz Chavez  MRN: [id number]  Patient Class: OP- Observation   Admission Date: 8/9/2020  Length of Stay: 0 days  Attending Physician: Casey Tsai MD  Primary Care Provider: Kendrick Hatfield MD        Subjective:     Principal Problem:Acute encephalopathy        HPI:  No notes on file    Overview/Hospital Course:  08/10  Mental status almost back to baseline  No Other issues    Interval History:     Review of Systems   Unable to perform ROS: Dementia     Objective:     Vital Signs (Most Recent):  Temp: 97.5 °F (36.4 °C) (08/10/20 1918)  Pulse: (!) 46 (08/10/20 1918)  Resp: 18 (08/10/20 1918)  BP: (!) 165/93 (08/10/20 1918)  SpO2: 99 % (08/10/20 1918) Vital Signs (24h Range):  Temp:  [97.4 °F (36.3 °C)-98.2 °F (36.8 °C)] 97.5 °F (36.4 °C)  Pulse:  [45-56] 46  Resp:  [16-19] 18  SpO2:  [97 %-99 %] 99 %  BP: (133-192)/(59-95) 165/93     Weight: 78.4 kg (172 lb 13.5 oz)  Body mass index is 25.52 kg/m².    Intake/Output Summary (Last 24 hours) at 8/10/2020 2148  Last data filed at 8/10/2020 1333  Gross per 24 hour   Intake 240 ml   Output --   Net 240 ml      Physical Exam  Vitals signs and nursing note reviewed.   Constitutional:       Appearance: He is well-developed.   HENT:      Head: Normocephalic and atraumatic.      Nose: Nose normal.      Mouth/Throat:      Mouth: Mucous membranes are moist.   Eyes:      Pupils: Pupils are equal, round, and reactive to light.   Neck:      Musculoskeletal: Full passive range of motion without pain and normal range of motion.   Cardiovascular:      Rate and Rhythm: Normal rate and regular rhythm.   Pulmonary:      Effort: Pulmonary effort is normal.      Breath sounds: Normal breath sounds.   Abdominal:      General: Bowel sounds are normal.      Palpations: Abdomen is soft.   Musculoskeletal: Normal range of motion.   Skin:     General: Skin is warm.   Neurological:      General: No focal deficit  present.      Mental Status: He is alert.   Psychiatric:         Behavior: Behavior normal.         Significant Labs:   CBC:   Recent Labs   Lab 08/09/20  1850 08/10/20  0127   WBC 6.52 5.25   HGB 9.5* 9.9*   HCT 29.5* 30.3*    249     CMP:   Recent Labs   Lab 08/09/20  1850 08/10/20  0127    142   K 3.9 3.7    108   CO2 25 24   * 106   BUN 25* 25*   CREATININE 1.4 1.3   CALCIUM 8.7 8.5*   PROT 6.4 5.8*   ALBUMIN 3.3* 3.1*   BILITOT 0.7 0.6   ALKPHOS 54* 54*   AST 20 18   ALT 17 15   ANIONGAP 9 10   EGFRNONAA 46.8* 51.2*       Significant Imaging: I have reviewed all pertinent imaging results/findings within the past 24 hours.      Assessment/Plan:      * Acute encephalopathy  Now Back to baseline  Admitted with same c/o last month with detail work up      Syncope  Admitted with syncope  Admission last month with same c/o      DNR (do not resuscitate)  DNR      Dementia  Chronic issue      Symptomatic bradycardia  Last time upon admission plan was to DC toprol  But pt still takes toprol  DC Toprol and amlodipine        VTE Risk Mitigation (From admission, onward)         Ordered     apixaban tablet 5 mg  2 times daily      08/09/20 2203     Place ADONIS hose  Until discontinued      08/09/20 2203     Reason for No Pharmacological VTE Prophylaxis  Once     Question:  Reasons:  Answer:  Already adequately anticoagulated on oral Anticoagulants    08/09/20 2203     IP VTE HIGH RISK PATIENT  Once      08/09/20 2203                      Casey Tsai MD  Department of Hospital Medicine   Formerly [facility], [facility]

## 2020-08-11 NOTE — ASSESSMENT & PLAN NOTE
Last time upon admission plan was to DC toprol  But pt still takes toprol  DC Toprol and amlodipine

## 2020-08-12 NOTE — DISCHARGE SUMMARY
[facility] Medicine  Discharge Summary      Patient Name: Tomasz Chavez  MRN: [id number]  Admission Date: 8/9/2020  Hospital Length of Stay: 0 days  Discharge Date and Time:  08/11/2020 10:26 PM  Attending Physician: No att. providers found   Discharging Provider: Casey Tsai MD  Primary Care Provider: Kendrick Hatfield MD      HPI:   No notes on file    * No surgery found *      Hospital Course:   Patient admitted with acute on chronic encephalopathy/Syncope in the background of advanced dementia  Patient was seen by Neuro Team  Patient was admitted last month with same c/o  Last time and This time he had extensive investigations which were all normal  Last admission pt was advised to discontinue Toprol because of bradycardia  Patient was found to have taking toprol again upon this admission and had baseline bradycardia  Toprol and Amlodipine were discontinued and he got discharged to home with  nurse/aid  Pts code status is DNR.Ideally pt should be under Hospice care      Consults:   Consults (From admission, onward)        Status Ordering Provider     Inpatient consult to Neurology  Once     Provider:  MD Sandeep Moon NERISSA          No new Assessment & Plan notes have been filed under this hospital service since the last note was generated.  Service: Hospital Medicine    Final Active Diagnoses:    Diagnosis Date Noted POA    DNR (do not resuscitate) [Z66] 08/10/2020 Unknown    Dementia [F03.90] 07/09/2020 Yes      Problems Resolved During this Admission:    Diagnosis Date Noted Date Resolved POA    PRINCIPAL PROBLEM:  Acute encephalopathy [G93.40] 08/10/2020 08/11/2020 Unknown    Syncope [R55] 07/09/2020 08/11/2020 Yes    Symptomatic bradycardia [R00.1] 07/09/2020 08/11/2020 Yes       Discharged Condition: good    Disposition: Home-Health Care c    Follow Up:  Follow-up Information     Kendrick Hatfield MD In 1 week.    Specialty: Family  Medicine  Contact information:  [place] [id number]  [phone]                 Patient Instructions:      Ambulatory referral/consult to Home Health   Standing Status: Future   Referral Priority: Routine Referral Type: Home Health Care   Referral Reason: Specialty Services Required   Requested Specialty: Home Health Services   Number of Visits Requested: 1     Diet Cardiac     Activity as tolerated       Significant Diagnostic Studies: Labs:   CMP   Recent Labs   Lab 08/10/20  0127 08/11/20  0357    142   K 3.7 3.4*    107   CO2 24 24    140*   BUN 25* 28*   CREATININE 1.3 1.4   CALCIUM 8.5* 8.6*   PROT 5.8* 6.3   ALBUMIN 3.1* 3.1*   BILITOT 0.6 0.5   ALKPHOS 54* 59   AST 18 18   ALT 15 15   ANIONGAP 10 11   ESTGFRAFRICA 59.2* 54.1*   EGFRNONAA 51.2* 46.8*    and CBC   Recent Labs   Lab 08/10/20  0127 08/11/20  0357   WBC 5.25 5.41   HGB 9.9* 10.0*   HCT 30.3* 30.1*    250       Pending Diagnostic Studies:     Procedure Component Value Units Date/Time    Drugs of Abuse Screen, Blood [[id number]] Collected: 08/09/20 2025    Order Status: Sent Lab Status: In process Updated: 08/09/20 2033    Specimen: Blood     Vitamin B1 [[id number]] Collected: 08/10/20 1401    Order Status: Sent Lab Status: In process Updated: 08/10/20 1410    Specimen: Blood     Narrative:      Collection has been rescheduled by [facility] at 08/10/2020 11:49 Reason:   ultrasound with pt    Vitamin B6 [[id number]] Collected: 08/11/20 0357    Order Status: Sent Lab Status: In process Updated: 08/11/20 0415    Specimen: Blood          Medications:  Reconciled Home Medications:      Medication List      START taking these medications    cloNIDine 0.1 MG tablet  Commonly known as: CATAPRES  Take 1 tablet (0.1 mg total) by mouth 3 (three) times daily as needed (if Systolic blood pressure greater than 160 mm of Hg).     hydroCHLOROthiazide 25 MG tablet  Commonly known as: HYDRODIURIL  Take 1 tablet (25 mg total) by mouth  once daily.  Start taking on: August 12, 2020        CONTINUE taking these medications    atorvastatin 10 MG tablet  Commonly known as: LIPITOR  10 mg nightly.     docusate sodium 100 MG capsule  Commonly known as: COLACE  Take 100 mg by mouth 2 (two) times daily.     donepeziL 10 MG tablet  Commonly known as: ARICEPT  once daily.     ELIQUIS 5 mg Tab  Generic drug: apixaban  Take 1 tablet by mouth 2 (two) times daily.     escitalopram oxalate 10 MG tablet  Commonly known as: LEXAPRO  Take 1 tablet by mouth once daily.     furosemide 20 MG tablet  Commonly known as: LASIX  Take 1 tablet by mouth once daily.     isosorbide mononitrate 60 MG 24 hr tablet  Commonly known as: IMDUR  Take 1 tablet (60 mg total) by mouth once daily.     levothyroxine 25 MCG tablet  Commonly known as: SYNTHROID  Take 1 tablet by mouth once daily.     lisinopriL 40 MG tablet  Commonly known as: PRINIVIL,ZESTRIL  Take 1 tablet by mouth once daily.     mirtazapine 30 MG tablet  Commonly known as: REMERON  Take 1 tablet by mouth every evening.     multivitamin capsule  Take 1 capsule by mouth once daily.     pantoprazole 40 MG tablet  Commonly known as: PROTONIX     potassium chloride SA 10 MEQ tablet  Commonly known as: K-DUR,KLOR-CON  Take 1 tablet by mouth once daily.        STOP taking these medications    amLODIPine 10 MG tablet  Commonly known as: NORVASC     metoprolol succinate 50 MG 24 hr tablet  Commonly known as: TOPROL-XL            Indwelling Lines/Drains at time of discharge:   Lines/Drains/Airways     None                 Time spent on the discharge of patient: 25  minutes  Patient was seen and examined on the date of discharge and determined to be suitable for discharge.         Casey Tsai MD  Department of Hospital Medicine  Formerly [facility]

## 2020-08-13 LAB
AMPHETAMINES SERPL QL SCN: NEGATIVE NG/ML
BARBITURATES SERPL QL SCN: NEGATIVE UG/ML
BENZODIAZ SERPL QL SCN: NEGATIVE NG/ML
BENZODIAZ SERPL QL SCN: NEGATIVE NG/ML
CANNABINOIDS SERPL QL SCN: NEGATIVE NG/ML
METHADONE SERPL QL SCN: NEGATIVE NG/ML
OPIATES SERPL QL SCN: NEGATIVE NG/ML
OXYCODONE+OXYMORPHONE SERPLBLD QL SCN: NEGATIVE NG/ML
PCP SERPL QL SCN: NEGATIVE NG/ML
PROPOXYPH SERPL QL SCN: NEGATIVE NG/ML
VIT B1 BLD-SCNC: 113.5 NMOL/L (ref 66.5–200)

## 2020-08-14 LAB
BACTERIA BLD CULT: NORMAL
BACTERIA BLD CULT: NORMAL
VIT B6 SERPL-MCNC: 7 UG/L (ref 5.3–46.7)

## 2020-08-15 ENCOUNTER — HOSPITAL ENCOUNTER (INPATIENT)
Facility: HOSPITAL | Age: 80
LOS: 11 days | Discharge: HOSPICE | DRG: 470 | End: 2020-08-26
Attending: EMERGENCY MEDICINE | Admitting: INTERNAL MEDICINE
Payer: MEDICARE

## 2020-08-15 ENCOUNTER — ANESTHESIA EVENT (OUTPATIENT)
Dept: SURGERY | Facility: HOSPITAL | Age: 80
DRG: 470 | End: 2020-08-15
Payer: MEDICARE

## 2020-08-15 DIAGNOSIS — Z01.810 PREOP CARDIOVASCULAR EXAM: ICD-10-CM

## 2020-08-15 DIAGNOSIS — S72.002A CLOSED DISPLACED FRACTURE OF LEFT FEMORAL NECK: ICD-10-CM

## 2020-08-15 DIAGNOSIS — S72.009A CLOSED FRACTURE OF HIP, UNSPECIFIED LATERALITY, INITIAL ENCOUNTER: Primary | ICD-10-CM
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
## 2020-08-15 PROBLEM — I10 HTN (HYPERTENSION): Status: ACTIVE | Noted: 2020-08-15

## 2020-08-15 LAB
ALBUMIN SERPL BCP-MCNC: 3.5 G/DL (ref 3.5–5.2)
ALP SERPL-CCNC: 61 U/L (ref 55–135)
ALT SERPL W/O P-5'-P-CCNC: 19 U/L (ref 10–44)
ANION GAP SERPL CALC-SCNC: 8 MMOL/L (ref 8–16)
APTT PPP: 34.2 SEC (ref 23.6–33.3)
AST SERPL-CCNC: 25 U/L (ref 10–40)
BACTERIA #/AREA URNS HPF: NEGATIVE /HPF
BASOPHILS # BLD AUTO: 0 K/UL (ref 0–0.2)
BASOPHILS NFR BLD: 0 % (ref 0–1.9)
BILIRUB SERPL-MCNC: 0.8 MG/DL (ref 0.1–1)
BILIRUB UR QL STRIP: NEGATIVE
BUN SERPL-MCNC: 22 MG/DL (ref 8–23)
CALCIUM SERPL-MCNC: 8.7 MG/DL (ref 8.7–10.5)
CHLORIDE SERPL-SCNC: 102 MMOL/L (ref 95–110)
CLARITY UR: CLEAR
CO2 SERPL-SCNC: 27 MMOL/L (ref 23–29)
COLOR UR: YELLOW
CREAT SERPL-MCNC: 1.2 MG/DL (ref 0.5–1.4)
DIFFERENTIAL METHOD: ABNORMAL
EOSINOPHIL # BLD AUTO: 0 K/UL (ref 0–0.5)
EOSINOPHIL NFR BLD: 0 % (ref 0–8)
ERYTHROCYTE [DISTWIDTH] IN BLOOD BY AUTOMATED COUNT: 13.6 % (ref 11.5–14.5)
EST. GFR  (AFRICAN AMERICAN): >60 ML/MIN/1.73 M^2
EST. GFR  (NON AFRICAN AMERICAN): 56.4 ML/MIN/1.73 M^2
GLUCOSE SERPL-MCNC: 119 MG/DL (ref 70–110)
GLUCOSE UR QL STRIP: NEGATIVE
HCT VFR BLD AUTO: 34 % (ref 40–54)
HGB BLD-MCNC: 11.1 G/DL (ref 14–18)
HGB UR QL STRIP: ABNORMAL
HYALINE CASTS #/AREA URNS LPF: 7 /LPF
IMM GRANULOCYTES # BLD AUTO: 0.01 K/UL (ref 0–0.04)
IMM GRANULOCYTES NFR BLD AUTO: 0.1 % (ref 0–0.5)
INR PPP: 1.2
KETONES UR QL STRIP: NEGATIVE
LEUKOCYTE ESTERASE UR QL STRIP: NEGATIVE
LYMPHOCYTES # BLD AUTO: 0.6 K/UL (ref 1–4.8)
LYMPHOCYTES NFR BLD: 7.6 % (ref 18–48)
MAGNESIUM SERPL-MCNC: 1.9 MG/DL (ref 1.6–2.6)
MCH RBC QN AUTO: 33.1 PG (ref 27–31)
MCHC RBC AUTO-ENTMCNC: 32.6 G/DL (ref 32–36)
MCV RBC AUTO: 102 FL (ref 82–98)
MICROSCOPIC COMMENT: ABNORMAL
MONOCYTES # BLD AUTO: 0.3 K/UL (ref 0.3–1)
MONOCYTES NFR BLD: 3.1 % (ref 4–15)
NEUTROPHILS # BLD AUTO: 7.5 K/UL (ref 1.8–7.7)
NEUTROPHILS NFR BLD: 89.2 % (ref 38–73)
NITRITE UR QL STRIP: NEGATIVE
NRBC BLD-RTO: 0 /100 WBC
PH UR STRIP: 8 [PH] (ref 5–8)
PLATELET # BLD AUTO: 207 K/UL (ref 150–350)
PMV BLD AUTO: 10.2 FL (ref 9.2–12.9)
POTASSIUM SERPL-SCNC: 3.6 MMOL/L (ref 3.5–5.1)
PROT SERPL-MCNC: 6.4 G/DL (ref 6–8.4)
PROT UR QL STRIP: ABNORMAL
PROTHROMBIN TIME: 14.7 SEC (ref 10.6–14.8)
RBC # BLD AUTO: 3.35 M/UL (ref 4.6–6.2)
RBC #/AREA URNS HPF: 11 /HPF (ref 0–4)
SARS-COV-2 RDRP RESP QL NAA+PROBE: NEGATIVE
SODIUM SERPL-SCNC: 137 MMOL/L (ref 136–145)
SP GR UR STRIP: 1.01 (ref 1–1.03)
SQUAMOUS #/AREA URNS HPF: 8 /HPF
URN SPEC COLLECT METH UR: ABNORMAL
UROBILINOGEN UR STRIP-ACNC: NEGATIVE EU/DL
WBC # BLD AUTO: 8.37 K/UL (ref 3.9–12.7)
WBC #/AREA URNS HPF: 1 /HPF (ref 0–5)

## 2020-08-15 PROCEDURE — 63600175 PHARM REV CODE 636 W HCPCS: Performed by: INTERNAL MEDICINE

## 2020-08-15 PROCEDURE — 96374 THER/PROPH/DIAG INJ IV PUSH: CPT

## 2020-08-15 PROCEDURE — U0002 COVID-19 LAB TEST NON-CDC: HCPCS

## 2020-08-15 PROCEDURE — 25000003 PHARM REV CODE 250: Performed by: INTERNAL MEDICINE

## 2020-08-15 PROCEDURE — 80053 COMPREHEN METABOLIC PANEL: CPT

## 2020-08-15 PROCEDURE — 81001 URINALYSIS AUTO W/SCOPE: CPT

## 2020-08-15 PROCEDURE — 85730 THROMBOPLASTIN TIME PARTIAL: CPT

## 2020-08-15 PROCEDURE — 93005 ELECTROCARDIOGRAM TRACING: CPT | Performed by: INTERNAL MEDICINE

## 2020-08-15 PROCEDURE — 96375 TX/PRO/DX INJ NEW DRUG ADDON: CPT

## 2020-08-15 PROCEDURE — 83735 ASSAY OF MAGNESIUM: CPT

## 2020-08-15 PROCEDURE — 99285 EMERGENCY DEPT VISIT HI MDM: CPT | Mod: 25

## 2020-08-15 PROCEDURE — 85610 PROTHROMBIN TIME: CPT

## 2020-08-15 PROCEDURE — 12000002 HC ACUTE/MED SURGE SEMI-PRIVATE ROOM

## 2020-08-15 PROCEDURE — 63600175 PHARM REV CODE 636 W HCPCS: Performed by: EMERGENCY MEDICINE

## 2020-08-15 PROCEDURE — 99223 1ST HOSP IP/OBS HIGH 75: CPT | Mod: ,,, | Performed by: ORTHOPAEDIC SURGERY

## 2020-08-15 PROCEDURE — 99223 PR INITIAL HOSPITAL CARE,LEVL III: ICD-10-PCS | Mod: ,,, | Performed by: ORTHOPAEDIC SURGERY

## 2020-08-15 PROCEDURE — 85025 COMPLETE CBC W/AUTO DIFF WBC: CPT

## 2020-08-15 RX ORDER — HYDRALAZINE HYDROCHLORIDE 20 MG/ML
10 INJECTION INTRAMUSCULAR; INTRAVENOUS EVERY 6 HOURS PRN
Status: DISCONTINUED | OUTPATIENT
Start: 2020-08-15 | End: 2020-08-26 | Stop reason: HOSPADM

## 2020-08-15 RX ORDER — POTASSIUM CHLORIDE 1.5 G/1.58G
40 POWDER, FOR SOLUTION ORAL
Status: DISCONTINUED | OUTPATIENT
Start: 2020-08-15 | End: 2020-08-26 | Stop reason: HOSPADM

## 2020-08-15 RX ORDER — HYDRALAZINE HYDROCHLORIDE 25 MG/1
25 TABLET, FILM COATED ORAL EVERY 8 HOURS
Status: DISCONTINUED | OUTPATIENT
Start: 2020-08-15 | End: 2020-08-16

## 2020-08-15 RX ORDER — MORPHINE SULFATE 2 MG/ML
2 INJECTION, SOLUTION INTRAMUSCULAR; INTRAVENOUS
Status: COMPLETED | OUTPATIENT
Start: 2020-08-15 | End: 2020-08-15

## 2020-08-15 RX ORDER — AMLODIPINE BESYLATE 10 MG/1
10 TABLET ORAL DAILY
Status: ON HOLD | COMMUNITY
End: 2020-08-26 | Stop reason: HOSPADM

## 2020-08-15 RX ORDER — ONDANSETRON 2 MG/ML
4 INJECTION INTRAMUSCULAR; INTRAVENOUS
Status: COMPLETED | OUTPATIENT
Start: 2020-08-15 | End: 2020-08-15

## 2020-08-15 RX ORDER — HYDROCHLOROTHIAZIDE 25 MG/1
25 TABLET ORAL DAILY
Status: DISCONTINUED | OUTPATIENT
Start: 2020-08-16 | End: 2020-08-17

## 2020-08-15 RX ORDER — MORPHINE SULFATE 2 MG/ML
2 INJECTION, SOLUTION INTRAMUSCULAR; INTRAVENOUS EVERY 4 HOURS PRN
Status: DISCONTINUED | OUTPATIENT
Start: 2020-08-15 | End: 2020-08-20

## 2020-08-15 RX ORDER — DOCUSATE SODIUM 100 MG/1
100 CAPSULE, LIQUID FILLED ORAL 2 TIMES DAILY
Status: DISCONTINUED | OUTPATIENT
Start: 2020-08-15 | End: 2020-08-17

## 2020-08-15 RX ORDER — ISOSORBIDE MONONITRATE 60 MG/1
60 TABLET, EXTENDED RELEASE ORAL DAILY
Status: DISCONTINUED | OUTPATIENT
Start: 2020-08-15 | End: 2020-08-26 | Stop reason: HOSPADM

## 2020-08-15 RX ORDER — CLONIDINE HYDROCHLORIDE 0.1 MG/1
0.1 TABLET ORAL 3 TIMES DAILY PRN
Status: DISCONTINUED | OUTPATIENT
Start: 2020-08-15 | End: 2020-08-26 | Stop reason: HOSPADM

## 2020-08-15 RX ORDER — METOPROLOL SUCCINATE 50 MG/1
50 TABLET, EXTENDED RELEASE ORAL DAILY
Status: ON HOLD | COMMUNITY
End: 2020-08-26 | Stop reason: HOSPADM

## 2020-08-15 RX ORDER — LANOLIN ALCOHOL/MO/W.PET/CERES
800 CREAM (GRAM) TOPICAL
Status: DISCONTINUED | OUTPATIENT
Start: 2020-08-15 | End: 2020-08-26 | Stop reason: HOSPADM

## 2020-08-15 RX ORDER — LISINOPRIL 20 MG/1
40 TABLET ORAL DAILY
Status: DISCONTINUED | OUTPATIENT
Start: 2020-08-15 | End: 2020-08-17

## 2020-08-15 RX ORDER — HYDROCODONE BITARTRATE AND ACETAMINOPHEN 5; 325 MG/1; MG/1
1 TABLET ORAL EVERY 6 HOURS PRN
Status: DISCONTINUED | OUTPATIENT
Start: 2020-08-15 | End: 2020-08-21

## 2020-08-15 RX ORDER — ONDANSETRON 2 MG/ML
4 INJECTION INTRAMUSCULAR; INTRAVENOUS EVERY 6 HOURS PRN
Status: DISCONTINUED | OUTPATIENT
Start: 2020-08-15 | End: 2020-08-16

## 2020-08-15 RX ORDER — ATORVASTATIN CALCIUM 20 MG/1
20 TABLET, FILM COATED ORAL NIGHTLY
Status: DISCONTINUED | OUTPATIENT
Start: 2020-08-15 | End: 2020-08-26 | Stop reason: HOSPADM

## 2020-08-15 RX ADMIN — CLONIDINE HYDROCHLORIDE 0.1 MG: 0.1 TABLET ORAL at 02:08

## 2020-08-15 RX ADMIN — ONDANSETRON 4 MG: 2 INJECTION INTRAMUSCULAR; INTRAVENOUS at 01:08

## 2020-08-15 RX ADMIN — DOCUSATE SODIUM 100 MG: 100 CAPSULE, LIQUID FILLED ORAL at 09:08

## 2020-08-15 RX ADMIN — HYDRALAZINE HYDROCHLORIDE 10 MG: 20 INJECTION INTRAMUSCULAR; INTRAVENOUS at 03:08

## 2020-08-15 RX ADMIN — LISINOPRIL 40 MG: 20 TABLET ORAL at 06:08

## 2020-08-15 RX ADMIN — MORPHINE SULFATE 2 MG: 2 INJECTION, SOLUTION INTRAMUSCULAR; INTRAVENOUS at 01:08

## 2020-08-15 RX ADMIN — ATORVASTATIN CALCIUM 20 MG: 20 TABLET, FILM COATED ORAL at 09:08

## 2020-08-15 RX ADMIN — MORPHINE SULFATE 2 MG: 2 INJECTION, SOLUTION INTRAMUSCULAR; INTRAVENOUS at 10:08

## 2020-08-15 RX ADMIN — HYDROCODONE BITARTRATE AND ACETAMINOPHEN 1 TABLET: 5; 325 TABLET ORAL at 04:08

## 2020-08-15 RX ADMIN — ISOSORBIDE MONONITRATE 60 MG: 60 TABLET, EXTENDED RELEASE ORAL at 05:08

## 2020-08-15 RX ADMIN — HYDRALAZINE HYDROCHLORIDE 25 MG: 25 TABLET, FILM COATED ORAL at 09:08

## 2020-08-15 NOTE — CONSULTS
Past Medical History:   Diagnosis Date    Dementia     GERD (gastroesophageal reflux disease)     Hypertension     Sleep apnea     does not use machine    Wears glasses        Past Surgical History:   Procedure Laterality Date    APPENDECTOMY      CHOLECYSTECTOMY      FINGER SURGERY      laceration    HERNIA REPAIR         Current Facility-Administered Medications   Medication    atorvastatin tablet 20 mg    cloNIDine tablet 0.1 mg    docusate sodium capsule 100 mg    hydrALAZINE injection 10 mg    hydrALAZINE tablet 25 mg    hydroCHLOROthiazide tablet 25 mg    HYDROcodone-acetaminophen 5-325 mg per tablet 1 tablet    isosorbide mononitrate 24 hr tablet 60 mg    lisinopriL tablet 40 mg    magnesium oxide tablet 800 mg    magnesium oxide tablet 800 mg    morphine injection 2 mg    ondansetron injection 4 mg    potassium chloride packet 40 mEq    potassium chloride packet 40 mEq     Current Outpatient Medications   Medication Sig    amLODIPine (NORVASC) 10 MG tablet Take 10 mg by mouth once daily.    atorvastatin (LIPITOR) 10 MG tablet Take 10 mg by mouth nightly.     cloNIDine (CATAPRES) 0.1 MG tablet Take 1 tablet (0.1 mg total) by mouth 3 (three) times daily as needed (if Systolic blood pressure greater than 160 mm of Hg).    docusate sodium (COLACE) 100 MG capsule Take 100 mg by mouth 2 (two) times daily.    donepezil (ARICEPT) 10 MG tablet Take 10 mg by mouth every evening.     ELIQUIS 5 mg Tab Take 1 tablet by mouth 2 (two) times daily.    escitalopram oxalate (LEXAPRO) 10 MG tablet Take 10 mg by mouth once daily.     furosemide (LASIX) 40 MG tablet Take 40 mg by mouth once daily.     hydroCHLOROthiazide (HYDRODIURIL) 25 MG tablet Take 1 tablet (25 mg total) by mouth once daily.    isosorbide mononitrate (IMDUR) 60 MG 24 hr tablet Take 1 tablet (60 mg total) by mouth once daily.    levothyroxine (SYNTHROID) 25 MCG tablet Take 1 tablet by mouth once daily.    lisinopriL  (PRINIVIL,ZESTRIL) 40 MG tablet Take 40 mg by mouth once daily.     metoprolol succinate (TOPROL-XL) 50 MG 24 hr tablet Take 50 mg by mouth once daily.    mirtazapine (REMERON) 30 MG tablet Take 30 mg by mouth every evening.     multivitamin capsule Take 1 capsule by mouth once daily.    pantoprazole (PROTONIX) 40 MG tablet Take 40 mg by mouth once daily.     potassium chloride SA (K-DUR,KLOR-CON) 10 MEQ tablet Take 1 tablet by mouth once daily.       Review of patient's allergies indicates:   Allergen Reactions    Hydralazine analogues        No family history on file.    Social History     Socioeconomic History    Marital status:      Spouse name: Not on file    Number of children: Not on file    Years of education: Not on file    Highest education level: Not on file   Occupational History    Not on file   Social Needs    Financial resource strain: Not on file    Food insecurity     Worry: Not on file     Inability: Not on file    Transportation needs     Medical: Not on file     Non-medical: Not on file   Tobacco Use    Smoking status: Former Smoker     Quit date:      Years since quittin.6   Substance and Sexual Activity    Alcohol use: No    Drug use: No    Sexual activity: Not on file   Lifestyle    Physical activity     Days per week: Not on file     Minutes per session: Not on file    Stress: Not on file   Relationships    Social connections     Talks on phone: Not on file     Gets together: Not on file     Attends Synagogue service: Not on file     Active member of club or organization: Not on file     Attends meetings of clubs or organizations: Not on file     Relationship status: Not on file   Other Topics Concern    Not on file   Social History Narrative    Not on file       Chief Complaint:   Chief Complaint   Patient presents with    Hip Pain     c/o L hip pain s/p fall. unwitnessed.        Consulting Physician: Self, Aaareferral    History of present  illness:    This is a 79 y.o. year old male who complains of left hip pain following an unwitnessed fall this morning.    Review of Systems:    Constitution:   Denies chills, fever, and sweats.  HENT:   Denies headaches or blurry vision.  Cardiovascular:  Denies chest pain or irregular heart beat.  Respiratory:   Denies cough or shortness of breath.  Gastrointestinal:  Denies abdominal pain, nausea, or vomiting.  Musculoskeletal:   Denies muscle cramps.  Neurological:   Denies dizziness or focal weakness.  Psychiatric/Behavior: Normal mental status.  Hematology/Lymph:  Denies bleeding problem or easy bruising/bleeding.  Skin:    Denies rash or suspicious lesions.    Examination:    Vital Signs:    Vitals:    08/15/20 1345 08/15/20 1400 08/15/20 1415 08/15/20 1511   BP:  (!) 210/93 (!) 206/87 (!) 212/100   Pulse:  (!) 58     Resp: 15 18     Temp:       TempSrc:       SpO2:  99%     Weight:       Height:           Body mass index is 24.33 kg/m².    Constitution:   Well-developed, well nourished patient in no acute distress.  Neurological:   Alert and oriented x 3 and cooperative to examination.     Psychiatric/Behavior: Normal mental status.  Respiratory:   No shortness of breath.  Eyes:    Extraoccular muscles intact  Skin:    No scars, rash or suspicious lesions.    Physical Exam: Left Hip Exam    Gait:   Non weight bearing    Skin  Rash:   None  Scars:   None    Inspection  Erythema:  None  Bruising:  Mild  Swelling:  Mild  Masses:  None  Lymphadenopathy: None    Range of Motion: Not tested due to fracture    Tenderness:  Diffuse    Strength:  Not tested due to fracture    Stability:  Not tested due to fracture    Sensation:  Intact    Vascular  Pulses:  Palpable distally          Imaging: X-rays ordered and images interpreted today personally by me of left hip show a femoral neck fracture with displacement.        Assessment: Closed fracture of hip, unspecified laterality, initial encounter    Fall  -     EKG  12-lead; Standing    Preop cardiovascular exam  -     Cancel: EKG 12-lead; Standing    Other orders  -     X-Ray Hip 2 or 3 views Left; Standing  -     X-Ray Chest AP Portable; Standing  -     Inpatient consult to Hospitalist; Standing  -     APTT; Standing  -     Magnesium; Standing  -     CBC auto differential; Standing  -     Comprehensive metabolic panel; Standing  -     Protime-INR; Standing  -     Insert Saline lock IV; Standing  -     morphine injection 2 mg  -     ondansetron injection 4 mg  -     Urinalysis, Reflex to Urine Culture Urine, Clean Catch; Standing  -     Inpatient consult to Hospitalist; Standing  -     atorvastatin tablet 20 mg  -     cloNIDine tablet 0.1 mg  -     docusate sodium capsule 100 mg  -     hydroCHLOROthiazide tablet 25 mg  -     isosorbide mononitrate 24 hr tablet 60 mg  -     lisinopriL tablet 40 mg  -     Vital Signs; Standing  -     Notify Physician; Standing  -     Insert saline lock; Standing  -     IP VTE HIGH RISK PATIENT; Standing  -     potassium chloride packet 40 mEq  -     potassium chloride packet 40 mEq  -     magnesium oxide tablet 800 mg  -     magnesium oxide tablet 800 mg  -     ondansetron injection 4 mg  -     Comprehensive Metabolic Panel (CMP); Standing  -     Magnesium; Standing  -     CBC with Automated Differential; Standing  -     Full code; Standing  -     Admit to Inpatient; Standing  -     Ambulate; Standing  -     Diet NPO; Standing  -     Diet Cardiac; Standing  -     Inpatient consult to Orthopedic Surgery; Standing  -     HYDROcodone-acetaminophen 5-325 mg per tablet 1 tablet  -     morphine injection 2 mg  -     Protime-INR; Standing  -     COVID-19 Rapid Screening; Standing  -     hydrALAZINE injection 10 mg  -     hydrALAZINE tablet 25 mg        Plan:  We discussed with Mr. Chavez's son Tyler the patients diagnosis and treatment options. Given his dementia we recommended a hemiarthroplasty and his son agreed. We will set him up for surgery  tomorrow morning.    After discussing the diagnosis and reviewing treatment options, the patient/family elected to proceed with surgical intervention.    In preparation for surgery:    A pre-operative clearance request was placed with hospital physician.    Pertinent risk factors and comorbidities for surgery were identified and reviewed.    Pre-operative antibiotics were ordered.    The risks, benefits, and alternatives to the procedure were explained to the patient/family including, but not limited to: incomplete pain relief, surgical failure, hardware failure, need for further procedures, damage to nerves, arteries, blood vessels and other structures, infection, Deep Vein Thrombosis (DVT), Pulmonary Embolus (PE), Complex Regional Pain Syndrome as well as general anesthetic complications including seizure, stroke, heart attack and even death. The patient/family understood these risks and wished to proceed and signed the informed consent. All questions were answered. No guarantees were implied or stated.    We discussed COVID19 with the patient. They are aware of our current policies and procedures, were given the option of delaying surgery, and they elected to proceed.    We will obtain the necessary clearances and schedule the patient for surgery.            DISCLAIMER: This note may have been dictated using voice recognition software and may contain grammatical errors.     NOTE: Consult report sent to referring provider via [employer].

## 2020-08-15 NOTE — ED NOTES
EMS states that per son's report, pt had a fall and is now c/o pain in his L hip and thigh area. Pt has hx of dementia and states he did not fall. Pt is resting on  Stretcher, NAD, aaax3 and alone in room.

## 2020-08-16 ENCOUNTER — ANESTHESIA (OUTPATIENT)
Dept: SURGERY | Facility: HOSPITAL | Age: 80
DRG: 470 | End: 2020-08-16
Payer: MEDICARE

## 2020-08-16 PROBLEM — S72.002A CLOSED DISPLACED FRACTURE OF LEFT FEMORAL NECK: Status: ACTIVE | Noted: 2020-08-15

## 2020-08-16 LAB
ALBUMIN SERPL BCP-MCNC: 3.3 G/DL (ref 3.5–5.2)
ALP SERPL-CCNC: 53 U/L (ref 55–135)
ALT SERPL W/O P-5'-P-CCNC: 18 U/L (ref 10–44)
ANION GAP SERPL CALC-SCNC: 8 MMOL/L (ref 8–16)
AST SERPL-CCNC: 19 U/L (ref 10–40)
BASOPHILS # BLD AUTO: 0.01 K/UL (ref 0–0.2)
BASOPHILS NFR BLD: 0.2 % (ref 0–1.9)
BILIRUB SERPL-MCNC: 1.3 MG/DL (ref 0.1–1)
BUN SERPL-MCNC: 27 MG/DL (ref 8–23)
CALCIUM SERPL-MCNC: 8.6 MG/DL (ref 8.7–10.5)
CHLORIDE SERPL-SCNC: 102 MMOL/L (ref 95–110)
CO2 SERPL-SCNC: 29 MMOL/L (ref 23–29)
CREAT SERPL-MCNC: 1.4 MG/DL (ref 0.5–1.4)
DIFFERENTIAL METHOD: ABNORMAL
EOSINOPHIL # BLD AUTO: 0.1 K/UL (ref 0–0.5)
EOSINOPHIL NFR BLD: 1.5 % (ref 0–8)
ERYTHROCYTE [DISTWIDTH] IN BLOOD BY AUTOMATED COUNT: 14 % (ref 11.5–14.5)
EST. GFR  (AFRICAN AMERICAN): 54.1 ML/MIN/1.73 M^2
EST. GFR  (NON AFRICAN AMERICAN): 46.8 ML/MIN/1.73 M^2
GLUCOSE SERPL-MCNC: 101 MG/DL (ref 70–110)
HCT VFR BLD AUTO: 32.1 % (ref 40–54)
HGB BLD-MCNC: 10.5 G/DL (ref 14–18)
IMM GRANULOCYTES # BLD AUTO: 0.02 K/UL (ref 0–0.04)
IMM GRANULOCYTES NFR BLD AUTO: 0.3 % (ref 0–0.5)
INR PPP: 1.3
LYMPHOCYTES # BLD AUTO: 0.9 K/UL (ref 1–4.8)
LYMPHOCYTES NFR BLD: 14.5 % (ref 18–48)
MAGNESIUM SERPL-MCNC: 1.9 MG/DL (ref 1.6–2.6)
MCH RBC QN AUTO: 34.2 PG (ref 27–31)
MCHC RBC AUTO-ENTMCNC: 32.7 G/DL (ref 32–36)
MCV RBC AUTO: 105 FL (ref 82–98)
MONOCYTES # BLD AUTO: 0.2 K/UL (ref 0.3–1)
MONOCYTES NFR BLD: 3.9 % (ref 4–15)
NEUTROPHILS # BLD AUTO: 4.7 K/UL (ref 1.8–7.7)
NEUTROPHILS NFR BLD: 79.6 % (ref 38–73)
NRBC BLD-RTO: 0 /100 WBC
PLATELET # BLD AUTO: 195 K/UL (ref 150–350)
PMV BLD AUTO: 10.7 FL (ref 9.2–12.9)
POTASSIUM SERPL-SCNC: 4 MMOL/L (ref 3.5–5.1)
PROT SERPL-MCNC: 6 G/DL (ref 6–8.4)
PROTHROMBIN TIME: 15.4 SEC (ref 10.6–14.8)
RBC # BLD AUTO: 3.07 M/UL (ref 4.6–6.2)
SODIUM SERPL-SCNC: 139 MMOL/L (ref 136–145)
WBC # BLD AUTO: 5.86 K/UL (ref 3.9–12.7)

## 2020-08-16 PROCEDURE — 25000003 PHARM REV CODE 250: Performed by: INTERNAL MEDICINE

## 2020-08-16 PROCEDURE — 37000008 HC ANESTHESIA 1ST 15 MINUTES: Performed by: ORTHOPAEDIC SURGERY

## 2020-08-16 PROCEDURE — 25000003 PHARM REV CODE 250: Performed by: ANESTHESIOLOGY

## 2020-08-16 PROCEDURE — 27000080 OPTIME MED/SURG SUP & DEVICES GENERAL CLASSIFICATION: Performed by: ORTHOPAEDIC SURGERY

## 2020-08-16 PROCEDURE — 27236 PR FEMORAL FX, OPEN TX: ICD-10-PCS | Mod: LT,,, | Performed by: ORTHOPAEDIC SURGERY

## 2020-08-16 PROCEDURE — 63600175 PHARM REV CODE 636 W HCPCS: Performed by: INTERNAL MEDICINE

## 2020-08-16 PROCEDURE — 36000711: Performed by: ORTHOPAEDIC SURGERY

## 2020-08-16 PROCEDURE — 27000284 HC CANNULA NASAL: Performed by: ANESTHESIOLOGY

## 2020-08-16 PROCEDURE — 63600175 PHARM REV CODE 636 W HCPCS: Performed by: ORTHOPAEDIC SURGERY

## 2020-08-16 PROCEDURE — 27201107 HC STYLET, STANDARD: Performed by: ANESTHESIOLOGY

## 2020-08-16 PROCEDURE — 27236 TREAT THIGH FRACTURE: CPT | Mod: LT,,, | Performed by: ORTHOPAEDIC SURGERY

## 2020-08-16 PROCEDURE — 27000671 HC TUBING MICROBORE EXT: Performed by: ANESTHESIOLOGY

## 2020-08-16 PROCEDURE — 27201423 OPTIME MED/SURG SUP & DEVICES STERILE SUPPLY: Performed by: ORTHOPAEDIC SURGERY

## 2020-08-16 PROCEDURE — 80053 COMPREHEN METABOLIC PANEL: CPT

## 2020-08-16 PROCEDURE — 25000003 PHARM REV CODE 250: Performed by: NURSE ANESTHETIST, CERTIFIED REGISTERED

## 2020-08-16 PROCEDURE — 85610 PROTHROMBIN TIME: CPT

## 2020-08-16 PROCEDURE — 36000710: Performed by: ORTHOPAEDIC SURGERY

## 2020-08-16 PROCEDURE — 27000673 HC TUBING BLOOD Y: Performed by: ANESTHESIOLOGY

## 2020-08-16 PROCEDURE — 37000009 HC ANESTHESIA EA ADD 15 MINS: Performed by: ORTHOPAEDIC SURGERY

## 2020-08-16 PROCEDURE — 85025 COMPLETE CBC W/AUTO DIFF WBC: CPT

## 2020-08-16 PROCEDURE — 63600175 PHARM REV CODE 636 W HCPCS: Performed by: NURSE ANESTHETIST, CERTIFIED REGISTERED

## 2020-08-16 PROCEDURE — C1776 JOINT DEVICE (IMPLANTABLE): HCPCS | Performed by: ORTHOPAEDIC SURGERY

## 2020-08-16 PROCEDURE — 36415 COLL VENOUS BLD VENIPUNCTURE: CPT

## 2020-08-16 PROCEDURE — 25000003 PHARM REV CODE 250: Performed by: ORTHOPAEDIC SURGERY

## 2020-08-16 PROCEDURE — 83735 ASSAY OF MAGNESIUM: CPT

## 2020-08-16 PROCEDURE — 27202107 HC XP QUATRO SENSOR: Performed by: ANESTHESIOLOGY

## 2020-08-16 PROCEDURE — 27100025 HC TUBING, SET FLUID WARMER: Performed by: ANESTHESIOLOGY

## 2020-08-16 PROCEDURE — 12000002 HC ACUTE/MED SURGE SEMI-PRIVATE ROOM

## 2020-08-16 PROCEDURE — 63600175 PHARM REV CODE 636 W HCPCS: Performed by: ANESTHESIOLOGY

## 2020-08-16 PROCEDURE — 71000033 HC RECOVERY, INTIAL HOUR: Performed by: ORTHOPAEDIC SURGERY

## 2020-08-16 DEVICE — IMPLANTABLE DEVICE: Type: IMPLANTABLE DEVICE | Site: HIP | Status: FUNCTIONAL

## 2020-08-16 RX ORDER — ROCURONIUM BROMIDE 10 MG/ML
INJECTION, SOLUTION INTRAVENOUS
Status: DISCONTINUED | OUTPATIENT
Start: 2020-08-16 | End: 2020-08-16

## 2020-08-16 RX ORDER — ONDANSETRON 2 MG/ML
4 INJECTION INTRAMUSCULAR; INTRAVENOUS DAILY PRN
Status: DISCONTINUED | OUTPATIENT
Start: 2020-08-16 | End: 2020-08-26 | Stop reason: HOSPADM

## 2020-08-16 RX ORDER — FENTANYL CITRATE 50 UG/ML
INJECTION, SOLUTION INTRAMUSCULAR; INTRAVENOUS
Status: DISCONTINUED | OUTPATIENT
Start: 2020-08-16 | End: 2020-08-16

## 2020-08-16 RX ORDER — EPHEDRINE SULFATE 50 MG/ML
INJECTION, SOLUTION INTRAVENOUS
Status: DISCONTINUED | OUTPATIENT
Start: 2020-08-16 | End: 2020-08-16

## 2020-08-16 RX ORDER — DIPHENHYDRAMINE HYDROCHLORIDE 50 MG/ML
12.5 INJECTION INTRAMUSCULAR; INTRAVENOUS
Status: DISCONTINUED | OUTPATIENT
Start: 2020-08-16 | End: 2020-08-17

## 2020-08-16 RX ORDER — PHENYLEPHRINE HYDROCHLORIDE 10 MG/ML
INJECTION INTRAVENOUS
Status: DISCONTINUED | OUTPATIENT
Start: 2020-08-16 | End: 2020-08-16

## 2020-08-16 RX ORDER — ONDANSETRON 2 MG/ML
INJECTION INTRAMUSCULAR; INTRAVENOUS
Status: DISCONTINUED | OUTPATIENT
Start: 2020-08-16 | End: 2020-08-16

## 2020-08-16 RX ORDER — HYDROCODONE BITARTRATE AND ACETAMINOPHEN 10; 325 MG/1; MG/1
1 TABLET ORAL EVERY 4 HOURS PRN
Status: DISCONTINUED | OUTPATIENT
Start: 2020-08-16 | End: 2020-08-22

## 2020-08-16 RX ORDER — HYDROCODONE BITARTRATE AND ACETAMINOPHEN 5; 325 MG/1; MG/1
1 TABLET ORAL EVERY 4 HOURS PRN
Status: DISCONTINUED | OUTPATIENT
Start: 2020-08-16 | End: 2020-08-21

## 2020-08-16 RX ORDER — SODIUM CHLORIDE 0.9 % (FLUSH) 0.9 %
10 SYRINGE (ML) INJECTION
Status: DISCONTINUED | OUTPATIENT
Start: 2020-08-16 | End: 2020-08-26 | Stop reason: HOSPADM

## 2020-08-16 RX ORDER — DEXAMETHASONE SODIUM PHOSPHATE 4 MG/ML
INJECTION, SOLUTION INTRA-ARTICULAR; INTRALESIONAL; INTRAMUSCULAR; INTRAVENOUS; SOFT TISSUE
Status: DISCONTINUED | OUTPATIENT
Start: 2020-08-16 | End: 2020-08-16

## 2020-08-16 RX ORDER — IBUPROFEN 400 MG/1
800 TABLET ORAL 3 TIMES DAILY
Status: DISCONTINUED | OUTPATIENT
Start: 2020-08-16 | End: 2020-08-17

## 2020-08-16 RX ORDER — CEFAZOLIN SODIUM 1 G/50ML
1 SOLUTION INTRAVENOUS
Status: COMPLETED | OUTPATIENT
Start: 2020-08-16 | End: 2020-08-17

## 2020-08-16 RX ORDER — PROPOFOL 10 MG/ML
VIAL (ML) INTRAVENOUS
Status: DISCONTINUED | OUTPATIENT
Start: 2020-08-16 | End: 2020-08-16

## 2020-08-16 RX ORDER — OXYCODONE HYDROCHLORIDE 5 MG/1
5 TABLET ORAL
Status: DISCONTINUED | OUTPATIENT
Start: 2020-08-16 | End: 2020-08-17

## 2020-08-16 RX ORDER — FENTANYL CITRATE 50 UG/ML
25 INJECTION, SOLUTION INTRAMUSCULAR; INTRAVENOUS
Status: DISCONTINUED | OUTPATIENT
Start: 2020-08-16 | End: 2020-08-17

## 2020-08-16 RX ORDER — CEFAZOLIN SODIUM 1 G/3ML
INJECTION, POWDER, FOR SOLUTION INTRAMUSCULAR; INTRAVENOUS
Status: DISCONTINUED | OUTPATIENT
Start: 2020-08-16 | End: 2020-08-16

## 2020-08-16 RX ORDER — LIDOCAINE HCL/PF 100 MG/5ML
SYRINGE (ML) INTRAVENOUS
Status: DISCONTINUED | OUTPATIENT
Start: 2020-08-16 | End: 2020-08-16

## 2020-08-16 RX ORDER — ACETAMINOPHEN 10 MG/ML
INJECTION, SOLUTION INTRAVENOUS
Status: DISCONTINUED | OUTPATIENT
Start: 2020-08-16 | End: 2020-08-16

## 2020-08-16 RX ORDER — HYDRALAZINE HYDROCHLORIDE 25 MG/1
25 TABLET, FILM COATED ORAL EVERY 12 HOURS
Status: DISCONTINUED | OUTPATIENT
Start: 2020-08-16 | End: 2020-08-17

## 2020-08-16 RX ORDER — SODIUM CHLORIDE, SODIUM LACTATE, POTASSIUM CHLORIDE, CALCIUM CHLORIDE 600; 310; 30; 20 MG/100ML; MG/100ML; MG/100ML; MG/100ML
INJECTION, SOLUTION INTRAVENOUS CONTINUOUS PRN
Status: DISCONTINUED | OUTPATIENT
Start: 2020-08-16 | End: 2020-08-16

## 2020-08-16 RX ORDER — LIDOCAINE HYDROCHLORIDE 20 MG/ML
JELLY TOPICAL
Status: DISCONTINUED | OUTPATIENT
Start: 2020-08-16 | End: 2020-08-16

## 2020-08-16 RX ORDER — SUCCINYLCHOLINE CHLORIDE 20 MG/ML
INJECTION INTRAMUSCULAR; INTRAVENOUS
Status: DISCONTINUED | OUTPATIENT
Start: 2020-08-16 | End: 2020-08-16

## 2020-08-16 RX ADMIN — ROCURONIUM BROMIDE 5 MG: 10 INJECTION, SOLUTION INTRAVENOUS at 08:08

## 2020-08-16 RX ADMIN — SODIUM CHLORIDE, SODIUM LACTATE, POTASSIUM CHLORIDE, AND CALCIUM CHLORIDE: .6; .31; .03; .02 INJECTION, SOLUTION INTRAVENOUS at 09:08

## 2020-08-16 RX ADMIN — DOCUSATE SODIUM 100 MG: 100 CAPSULE, LIQUID FILLED ORAL at 08:08

## 2020-08-16 RX ADMIN — SODIUM CHLORIDE, SODIUM LACTATE, POTASSIUM CHLORIDE, AND CALCIUM CHLORIDE: .6; .31; .03; .02 INJECTION, SOLUTION INTRAVENOUS at 08:08

## 2020-08-16 RX ADMIN — SUGAMMADEX 200 MG: 100 INJECTION, SOLUTION INTRAVENOUS at 10:08

## 2020-08-16 RX ADMIN — HYDRALAZINE HYDROCHLORIDE 25 MG: 25 TABLET, FILM COATED ORAL at 08:08

## 2020-08-16 RX ADMIN — EPHEDRINE SULFATE 15 MG: 50 INJECTION, SOLUTION INTRAVENOUS at 08:08

## 2020-08-16 RX ADMIN — ACETAMINOPHEN 1000 MG: 10 INJECTION, SOLUTION INTRAVENOUS at 08:08

## 2020-08-16 RX ADMIN — DOCUSATE SODIUM 100 MG: 100 CAPSULE, LIQUID FILLED ORAL at 11:08

## 2020-08-16 RX ADMIN — FENTANYL CITRATE 50 MCG: 50 INJECTION INTRAMUSCULAR; INTRAVENOUS at 09:08

## 2020-08-16 RX ADMIN — EPHEDRINE SULFATE 10 MG: 50 INJECTION, SOLUTION INTRAVENOUS at 08:08

## 2020-08-16 RX ADMIN — OXYCODONE HYDROCHLORIDE 5 MG: 5 TABLET ORAL at 08:08

## 2020-08-16 RX ADMIN — LIDOCAINE HYDROCHLORIDE 3 ML: 20 JELLY TOPICAL at 08:08

## 2020-08-16 RX ADMIN — FENTANYL CITRATE 25 MCG: 50 INJECTION, SOLUTION INTRAMUSCULAR; INTRAVENOUS at 11:08

## 2020-08-16 RX ADMIN — ISOSORBIDE MONONITRATE 60 MG: 60 TABLET, EXTENDED RELEASE ORAL at 11:08

## 2020-08-16 RX ADMIN — PHENYLEPHRINE HYDROCHLORIDE 100 MCG: 10 INJECTION INTRAVENOUS at 08:08

## 2020-08-16 RX ADMIN — ATORVASTATIN CALCIUM 20 MG: 20 TABLET, FILM COATED ORAL at 08:08

## 2020-08-16 RX ADMIN — LIDOCAINE HYDROCHLORIDE 100 MG: 20 INJECTION, SOLUTION INTRAVENOUS at 08:08

## 2020-08-16 RX ADMIN — ROCURONIUM BROMIDE 20 MG: 10 INJECTION, SOLUTION INTRAVENOUS at 09:08

## 2020-08-16 RX ADMIN — CEFAZOLIN SODIUM 1 G: 1 SOLUTION INTRAVENOUS at 10:08

## 2020-08-16 RX ADMIN — CEFAZOLIN 2 G: 330 INJECTION, POWDER, FOR SOLUTION INTRAMUSCULAR; INTRAVENOUS at 08:08

## 2020-08-16 RX ADMIN — DEXAMETHASONE SODIUM PHOSPHATE 8 MG: 4 INJECTION, SOLUTION INTRAMUSCULAR; INTRAVENOUS at 08:08

## 2020-08-16 RX ADMIN — HYDROCHLOROTHIAZIDE 25 MG: 25 TABLET ORAL at 11:08

## 2020-08-16 RX ADMIN — ONDANSETRON 4 MG: 2 INJECTION INTRAMUSCULAR; INTRAVENOUS at 08:08

## 2020-08-16 RX ADMIN — ROCURONIUM BROMIDE 25 MG: 10 INJECTION, SOLUTION INTRAVENOUS at 08:08

## 2020-08-16 RX ADMIN — LISINOPRIL 40 MG: 20 TABLET ORAL at 11:08

## 2020-08-16 RX ADMIN — PROPOFOL 130 MG: 10 INJECTION, EMULSION INTRAVENOUS at 08:08

## 2020-08-16 RX ADMIN — IBUPROFEN 800 MG: 400 TABLET, FILM COATED ORAL at 10:08

## 2020-08-16 RX ADMIN — FENTANYL CITRATE 50 MCG: 50 INJECTION INTRAMUSCULAR; INTRAVENOUS at 08:08

## 2020-08-16 RX ADMIN — SUCCINYLCHOLINE CHLORIDE 120 MG: 20 INJECTION, SOLUTION INTRAMUSCULAR; INTRAVENOUS at 08:08

## 2020-08-16 RX ADMIN — MORPHINE SULFATE 2 MG: 2 INJECTION, SOLUTION INTRAMUSCULAR; INTRAVENOUS at 10:08

## 2020-08-16 NOTE — OP NOTE
DATE OF PROCEDURE:  8/16/2020     PREOPERATIVE DIAGNOSIS: Left hip fracture     POSTOPERATIVE DIAGNOSIS: Left hip fracture.     PROCEDURE: Left hip hemiarthroplasty     SURGEON:  Rubio Brown M.D.     ASSISTANT: Odessa Kennedy      ANESTHESIA:  General     HISTORY: This is a 79 y.o. year old male who complains of left hip pain.    Imaging studies and examination were consistent with left hip fracture. We discussed different treatment options and the patient/family elected to proceed with hip hemiarthroplasty.  The risks and benefits of surgical intervention, including the potential for incomplete pain relief and the need for further procedures, were explained to the patient/family who expressed they understood and wished to proceed. Informed consent was obtained.     PROCEDURE IN DETAIL:  After verifying informed consent and correct site, the   patient was brought back to the Operating Room and placed on the OR table in   supine position.  Preoperative IV antibiotics were administered and a timeout   was performed.  The patient was turned onto their right side and supported using   the pegboard.  All bony prominences were well padded.  The left hip was then   prepped and draped in sterile fashion.      We began by creating a 15 cm long incision directly over the greater trochanter.    Dissection was carried down through skin and subcutaneous tissue until we   approached the IT band.  The IT band was then incised in line with its fibers  and retracted. We then reflected the anterior third of the lateral musculature off   of the hip and advanced anteriorly along the neck of the femur.  Dissection was   carried proximally and distally to allow us to visualize the femoral neck. The   fracture site was identified.  We then made a cleanup cut of the fracture site   based off of the height of the lesser trochanter.  Once we removed our femoral   neck cut, we extracted the femoral head from the acetabulum. The femoral  head  was measured and we trialed a similar size prosthetic head.    At this point, we turned our attention to the femur.  A box cutting osteotome was   used to gain access into the femoral canal.  A femoral canal finder was then   used followed by lateralizing reamer.  We then successively reamed in larger  sizes until the reamer had a snug fit. We then broached successively larger   until the broach fit well. We placed our trial femoral head and neck and reduced  the hip.  We then used the C-arm to compare the leg length based off the   lesser trochanter of the operative leg versus the nonoperative leg.  We then   extracted the trial components and copiously irrigated the femoral canal and the   acetabulum.  We then inserted our final femoral stem along with the neck and   bipolar head.  These were then reduced into the acetabulum.  Once again we   verified the position of our components and our leg length using the C-arm.  We   copiously irrigated the wound and began our closure.  The lateral musculature   was pulled back over to the greater trochanter using #1 Vicryl. The IT band was   closed using #1 Vicryl and finally the skin was approximated with 2-0 Vicryl and   closed with staples.  A sterile dressing was applied.  The patient was then   awoken from anesthesia, extubated, and brought to the PACU in good condition.     COMPONENTS: Depuy size 5 Femoral stem with 52 mm bipolar head    TOTAL TOURNIQUET TIME:  none     DRAINS:  None.     SPECIMENS:  None.     COMPLICATIONS:  None.     ESTIMATED BLOOD LOSS:  100 mL.     POSTOPERATIVE PLAN:  The patient will be admitted to post-op for care.

## 2020-08-16 NOTE — H&P
Critical access hospital Medicine  History & Physical    Patient Name: Tomasz Chavez  MRN: [id number]  Admission Date: 8/15/2020  Attending Physician: Casey Tsai MD   Primary Care Provider: Kendrick Hatfield MD         Patient information was obtained from patient and ER records.     Subjective:     Principal Problem:Closed fracture of hip    Chief Complaint:   Chief Complaint   Patient presents with    Hip Pain     c/o L hip pain s/p fall. unwitnessed.         HPI: 79 year old patient admitted with  Acute subcapital fracture of left femoral neck, with mild impaction  Patient was walking around today and had a fall  Later he felt severe pain on L hip area and was unable to move LLE  Patient described pain as severe/constant/with no radiation  Also he was unable to support his weight       Past Medical History:   Diagnosis Date    Dementia     GERD (gastroesophageal reflux disease)     Hypertension     Sleep apnea     does not use machine    Wears glasses        Past Surgical History:   Procedure Laterality Date    APPENDECTOMY      CHOLECYSTECTOMY      FINGER SURGERY      laceration    HERNIA REPAIR         Review of patient's allergies indicates:  No Known Allergies    No current facility-administered medications on file prior to encounter.      Current Outpatient Medications on File Prior to Encounter   Medication Sig    amLODIPine (NORVASC) 10 MG tablet Take 10 mg by mouth once daily.    atorvastatin (LIPITOR) 10 MG tablet Take 10 mg by mouth nightly.     cloNIDine (CATAPRES) 0.1 MG tablet Take 1 tablet (0.1 mg total) by mouth 3 (three) times daily as needed (if Systolic blood pressure greater than 160 mm of Hg).    docusate sodium (COLACE) 100 MG capsule Take 100 mg by mouth 2 (two) times daily.    donepezil (ARICEPT) 10 MG tablet Take 10 mg by mouth every evening.     ELIQUIS 5 mg Tab Take 1 tablet by mouth 2 (two) times daily.    escitalopram oxalate (LEXAPRO) 10 MG tablet Take 10  mg by mouth once daily.     furosemide (LASIX) 40 MG tablet Take 40 mg by mouth once daily.     hydroCHLOROthiazide (HYDRODIURIL) 25 MG tablet Take 1 tablet (25 mg total) by mouth once daily.    isosorbide mononitrate (IMDUR) 60 MG 24 hr tablet Take 1 tablet (60 mg total) by mouth once daily.    levothyroxine (SYNTHROID) 25 MCG tablet Take 1 tablet by mouth once daily.    lisinopriL (PRINIVIL,ZESTRIL) 40 MG tablet Take 40 mg by mouth once daily.     metoprolol succinate (TOPROL-XL) 50 MG 24 hr tablet Take 50 mg by mouth once daily.    mirtazapine (REMERON) 30 MG tablet Take 30 mg by mouth every evening.     multivitamin capsule Take 1 capsule by mouth once daily.    pantoprazole (PROTONIX) 40 MG tablet Take 40 mg by mouth once daily.     potassium chloride SA (K-DUR,KLOR-CON) 10 MEQ tablet Take 1 tablet by mouth once daily.     Family History     Problem Relation (Age of Onset)    Hypertension Father        Tobacco Use    Smoking status: Former Smoker     Quit date:      Years since quittin.6   Substance and Sexual Activity    Alcohol use: No    Drug use: No    Sexual activity: Not on file     Review of Systems   Constitutional: Negative for activity change and appetite change.   HENT: Negative for congestion and dental problem.    Eyes: Negative for discharge and itching.   Respiratory: Negative for shortness of breath.    Cardiovascular: Negative for chest pain.   Gastrointestinal: Negative for abdominal distention and abdominal pain.   Endocrine: Negative for cold intolerance.   Genitourinary: Negative for difficulty urinating and dysuria.   Musculoskeletal: Positive for arthralgias and back pain.   Skin: Negative for color change.   Neurological: Negative for dizziness and facial asymmetry.   Hematological: Negative for adenopathy.   Psychiatric/Behavioral: Negative for agitation and behavioral problems.     Objective:     Vital Signs (Most Recent):  Temp: 98.1 °F (36.7 °C) (08/15/20  1751)  Pulse: 73 (08/15/20 1843)  Resp: 15 (08/15/20 1751)  BP: (!) 145/79 (08/15/20 1843)  SpO2: 100 % (08/15/20 1751) Vital Signs (24h Range):  Temp:  [97.9 °F (36.6 °C)-98.1 °F (36.7 °C)] 98.1 °F (36.7 °C)  Pulse:  [55-73] 73  Resp:  [15-18] 15  SpO2:  [99 %-100 %] 100 %  BP: (123-212)/() 145/79     Weight: 74.8 kg (165 lb)  Body mass index is 25.09 kg/m².    Physical Exam  Vitals signs and nursing note reviewed.   Constitutional:       Appearance: He is well-developed.   HENT:      Head: Atraumatic.      Nose: Nose normal.      Mouth/Throat:      Mouth: Mucous membranes are moist.   Eyes:      Conjunctiva/sclera: Conjunctivae normal.   Neck:      Musculoskeletal: Full passive range of motion without pain and normal range of motion.   Cardiovascular:      Rate and Rhythm: Normal rate and regular rhythm.   Pulmonary:      Effort: Pulmonary effort is normal.      Breath sounds: Normal breath sounds.   Abdominal:      General: Bowel sounds are normal.      Palpations: Abdomen is soft.   Musculoskeletal:      Comments: LLE Limited movements   Skin:     General: Skin is warm.   Neurological:      Mental Status: He is alert and oriented to person, place, and time.   Psychiatric:         Mood and Affect: Mood normal.             Significant Labs:   CBC:   Recent Labs   Lab 08/15/20  1354   WBC 8.37   HGB 11.1*   HCT 34.0*        CMP:   Recent Labs   Lab 08/15/20  1354      K 3.6      CO2 27   *   BUN 22   CREATININE 1.2   CALCIUM 8.7   PROT 6.4   ALBUMIN 3.5   BILITOT 0.8   ALKPHOS 61   AST 25   ALT 19   ANIONGAP 8   EGFRNONAA 56.4*       Significant Imaging: I have reviewed all pertinent imaging results/findings within the past 24 hours.    Assessment/Plan:     * Closed fracture of hip  OR Visit Tomorrow Morning  Patient had ECHO 35 days ago which was normal  Preoperative clearance:Pt cleared for surgery Tmrw AM       HTN (hypertension)  Continue present Tx      DNR (do not  resuscitate)  DNR      Dementia  Stable issue      CKD (chronic kidney disease), stage III  Chronic stable issue        VTE Risk Mitigation (From admission, onward)         Ordered     IP VTE HIGH RISK PATIENT  Once      08/15/20 1410                   Casey Tsai MD  Department of Hospital Medicine   [facility]

## 2020-08-16 NOTE — ASSESSMENT & PLAN NOTE
OR Visit Tomorrow Morning  Patient had ECHO 35 days ago which was normal  Preoperative clearance:Pt cleared for surgery Tmrw AM

## 2020-08-16 NOTE — TRANSFER OF CARE
"Anesthesia Transfer of Care Note    Patient: Tomasz Chavez    Procedure(s) Performed: Procedure(s) (LRB):  HEMIARTHROPLASTY, HIP (Left)    Patient location: PACU    Anesthesia Type: general    Transport from OR: Transported from OR on room air with adequate spontaneous ventilation    Post pain: adequate analgesia    Post assessment: no apparent anesthetic complications    Post vital signs: stable    Level of consciousness: awake and alert    Nausea/Vomiting: no nausea/vomiting    Complications: none    Transfer of care protocol was followed      Last vitals:   Visit Vitals  BP (!) 173/79   Pulse (!) 53   Temp 36.6 °C (97.8 °F) (Oral)   Resp 18   Ht 5' 8" (1.727 m)   Wt 74.8 kg (165 lb)   SpO2 96%   BMI 25.09 kg/m²     "

## 2020-08-16 NOTE — HPI
79 year old patient admitted with  Acute subcapital fracture of left femoral neck, with mild impaction  Patient was walking around today and had a fall  Later he felt severe pain on L hip area and was unable to move LLE  Patient described pain as severe/constant/with no radiation  Also he was unable to support his weight

## 2020-08-16 NOTE — ANESTHESIA POSTPROCEDURE EVALUATION
Anesthesia Post Evaluation    Patient: Tomasz Chavez    Procedure(s) Performed: Procedure(s) (LRB):  HEMIARTHROPLASTY, HIP (Left)    Final Anesthesia Type: general    Patient location during evaluation: PACU  Patient participation: Yes- Able to Participate  Level of consciousness: awake and alert, oriented and awake  Post-procedure vital signs: reviewed and stable  Pain management: adequate  Airway patency: patent  DELORIS mitigation strategies: Multimodal analgesia, Extubation while patient is awake, Verification of full reversal of neuromuscular block and Extubation and recovery carried out in lateral, semiupright, or other nonsupine position  PONV status at discharge: No PONV  Anesthetic complications: no      Cardiovascular status: blood pressure returned to baseline, hemodynamically stable and stable  Respiratory status: unassisted, spontaneous ventilation and room air  Hydration status: euvolemic  Follow-up not needed.          Vitals Value Taken Time   /77 08/16/20 1115   Temp 36.4 °C (97.6 °F) 08/16/20 1115   Pulse 67 08/16/20 1125   Resp 18 08/16/20 1110   SpO2 96 % 08/16/20 1125   Vitals shown include unvalidated device data.      No case tracking events are documented in the log.      Pain/Jesse Score: Pain Rating Prior to Med Admin: 6 (8/16/2020 11:10 AM)  Pain Rating Post Med Admin: 0 (8/15/2020 10:45 PM)  Jesse Score: 10 (8/16/2020 11:15 AM)

## 2020-08-16 NOTE — SUBJECTIVE & OBJECTIVE
Interval History:     Review of Systems   Constitutional: Negative for activity change and appetite change.   HENT: Negative for congestion and dental problem.    Eyes: Negative for discharge and itching.   Respiratory: Negative for shortness of breath.    Cardiovascular: Negative for chest pain.   Gastrointestinal: Negative for abdominal distention and abdominal pain.   Endocrine: Negative for cold intolerance.   Genitourinary: Negative for difficulty urinating and dysuria.   Musculoskeletal: Negative for arthralgias and back pain.   Skin: Negative for color change.   Neurological: Negative for dizziness and facial asymmetry.   Hematological: Negative for adenopathy.   Psychiatric/Behavioral: Negative for agitation and behavioral problems.     Objective:     Vital Signs (Most Recent):  Temp: 97.7 °F (36.5 °C) (08/16/20 1546)  Pulse: 74 (08/16/20 1546)  Resp: 18 (08/16/20 1546)  BP: 131/65 (08/16/20 1546)  SpO2: 96 % (08/16/20 1546) Vital Signs (24h Range):  Temp:  [97.3 °F (36.3 °C)-99.2 °F (37.3 °C)] 97.7 °F (36.5 °C)  Pulse:  [53-77] 74  Resp:  [14-20] 18  SpO2:  [96 %-100 %] 96 %  BP: (106-173)/(53-81) 131/65     Weight: 74.8 kg (165 lb)  Body mass index is 25.09 kg/m².    Intake/Output Summary (Last 24 hours) at 8/16/2020 1839  Last data filed at 8/16/2020 1800  Gross per 24 hour   Intake 3000 ml   Output 915 ml   Net 2085 ml      Physical Exam  Vitals signs and nursing note reviewed.   Constitutional:       Appearance: He is well-developed.   HENT:      Head: Normocephalic and atraumatic.      Nose: Nose normal.      Mouth/Throat:      Mouth: Mucous membranes are moist.   Eyes:      Pupils: Pupils are equal, round, and reactive to light.   Neck:      Musculoskeletal: Full passive range of motion without pain and normal range of motion.   Cardiovascular:      Rate and Rhythm: Normal rate and regular rhythm.   Pulmonary:      Effort: Pulmonary effort is normal.      Breath sounds: Normal breath sounds.    Abdominal:      General: Bowel sounds are normal.      Palpations: Abdomen is soft.   Musculoskeletal: Normal range of motion.      Comments: LLE hip area limited movements   Skin:     General: Skin is warm.   Neurological:      Mental Status: He is alert and oriented to person, place, and time.   Psychiatric:         Mood and Affect: Mood normal.         Significant Labs:   CBC:   Recent Labs   Lab 08/15/20  1354 08/16/20  0702   WBC 8.37 5.86   HGB 11.1* 10.5*   HCT 34.0* 32.1*    195     CMP:   Recent Labs   Lab 08/15/20  1354 08/16/20  0702    139   K 3.6 4.0    102   CO2 27 29   * 101   BUN 22 27*   CREATININE 1.2 1.4   CALCIUM 8.7 8.6*   PROT 6.4 6.0   ALBUMIN 3.5 3.3*   BILITOT 0.8 1.3*   ALKPHOS 61 53*   AST 25 19   ALT 19 18   ANIONGAP 8 8   EGFRNONAA 56.4* 46.8*

## 2020-08-16 NOTE — HOSPITAL COURSE
08/16  Pt today had Left hip hemiarthroplasty  No other issues  BP well controlled    08/17  Pt had confused episodes yesterday night  Sitter near bedside    8/18/2020  Pt denies any pain and is progressing with PT. Interaction limited by dementia.    8/19/2020  Pt had PM encephalopathy that has resolved. He states that he has no pain, no problems.    8/20/2020  Mr Chavez is cooperative and working with PT this AM. No agitation    8/21/2020  Mr Chavez has had no agitation. He is cooperating with PT and staff.    8/22/2020  Pt had an episode of somnolence S/P a dose of pain medication. The patient recovered in a very short period of time and his pain medication was stopped for the present time    8/23/2020  Mr Riggins has noted pain in the post op extremity. Pt has an episode of encephalopathy and pain medications were discontinued. Pt had 6-8/10 painful face this date. He is unable to provide any more information.    8/24/2020  Mr Chavez had an episode of urinary retention last PM. He will not tolerate in and out cath's and he could pull out a brock. I have spoken to his son who desires to take the patient home on hospice. It is the only reasonable option as all facilities have refused him.

## 2020-08-16 NOTE — SUBJECTIVE & OBJECTIVE
Past Medical History:   Diagnosis Date    Dementia     GERD (gastroesophageal reflux disease)     Hypertension     Sleep apnea     does not use machine    Wears glasses        Past Surgical History:   Procedure Laterality Date    APPENDECTOMY      CHOLECYSTECTOMY      FINGER SURGERY      laceration    HERNIA REPAIR         Review of patient's allergies indicates:  No Known Allergies    No current facility-administered medications on file prior to encounter.      Current Outpatient Medications on File Prior to Encounter   Medication Sig    amLODIPine (NORVASC) 10 MG tablet Take 10 mg by mouth once daily.    atorvastatin (LIPITOR) 10 MG tablet Take 10 mg by mouth nightly.     cloNIDine (CATAPRES) 0.1 MG tablet Take 1 tablet (0.1 mg total) by mouth 3 (three) times daily as needed (if Systolic blood pressure greater than 160 mm of Hg).    docusate sodium (COLACE) 100 MG capsule Take 100 mg by mouth 2 (two) times daily.    donepezil (ARICEPT) 10 MG tablet Take 10 mg by mouth every evening.     ELIQUIS 5 mg Tab Take 1 tablet by mouth 2 (two) times daily.    escitalopram oxalate (LEXAPRO) 10 MG tablet Take 10 mg by mouth once daily.     furosemide (LASIX) 40 MG tablet Take 40 mg by mouth once daily.     hydroCHLOROthiazide (HYDRODIURIL) 25 MG tablet Take 1 tablet (25 mg total) by mouth once daily.    isosorbide mononitrate (IMDUR) 60 MG 24 hr tablet Take 1 tablet (60 mg total) by mouth once daily.    levothyroxine (SYNTHROID) 25 MCG tablet Take 1 tablet by mouth once daily.    lisinopriL (PRINIVIL,ZESTRIL) 40 MG tablet Take 40 mg by mouth once daily.     metoprolol succinate (TOPROL-XL) 50 MG 24 hr tablet Take 50 mg by mouth once daily.    mirtazapine (REMERON) 30 MG tablet Take 30 mg by mouth every evening.     multivitamin capsule Take 1 capsule by mouth once daily.    pantoprazole (PROTONIX) 40 MG tablet Take 40 mg by mouth once daily.     potassium chloride SA (K-DUR,KLOR-CON) 10 MEQ tablet  Take 1 tablet by mouth once daily.     Family History     Problem Relation (Age of Onset)    Hypertension Father        Tobacco Use    Smoking status: Former Smoker     Quit date:      Years since quittin.6   Substance and Sexual Activity    Alcohol use: No    Drug use: No    Sexual activity: Not on file     Review of Systems   Constitutional: Negative for activity change and appetite change.   HENT: Negative for congestion and dental problem.    Eyes: Negative for discharge and itching.   Respiratory: Negative for shortness of breath.    Cardiovascular: Negative for chest pain.   Gastrointestinal: Negative for abdominal distention and abdominal pain.   Endocrine: Negative for cold intolerance.   Genitourinary: Negative for difficulty urinating and dysuria.   Musculoskeletal: Positive for arthralgias and back pain.   Skin: Negative for color change.   Neurological: Negative for dizziness and facial asymmetry.   Hematological: Negative for adenopathy.   Psychiatric/Behavioral: Negative for agitation and behavioral problems.     Objective:     Vital Signs (Most Recent):  Temp: 98.1 °F (36.7 °C) (08/15/20 175)  Pulse: 73 (08/15/20 1843)  Resp: 15 (08/15/20 1751)  BP: (!) 145/79 (08/15/20 1843)  SpO2: 100 % (08/15/20 1751) Vital Signs (24h Range):  Temp:  [97.9 °F (36.6 °C)-98.1 °F (36.7 °C)] 98.1 °F (36.7 °C)  Pulse:  [55-73] 73  Resp:  [15-18] 15  SpO2:  [99 %-100 %] 100 %  BP: (123-212)/() 145/79     Weight: 74.8 kg (165 lb)  Body mass index is 25.09 kg/m².    Physical Exam  Vitals signs and nursing note reviewed.   Constitutional:       Appearance: He is well-developed.   HENT:      Head: Atraumatic.      Nose: Nose normal.      Mouth/Throat:      Mouth: Mucous membranes are moist.   Eyes:      Conjunctiva/sclera: Conjunctivae normal.   Neck:      Musculoskeletal: Full passive range of motion without pain and normal range of motion.   Cardiovascular:      Rate and Rhythm: Normal rate and regular  rhythm.   Pulmonary:      Effort: Pulmonary effort is normal.      Breath sounds: Normal breath sounds.   Abdominal:      General: Bowel sounds are normal.      Palpations: Abdomen is soft.   Musculoskeletal:      Comments: LLE Limited movements   Skin:     General: Skin is warm.   Neurological:      Mental Status: He is alert and oriented to person, place, and time.   Psychiatric:         Mood and Affect: Mood normal.             Significant Labs:   CBC:   Recent Labs   Lab 08/15/20  1354   WBC 8.37   HGB 11.1*   HCT 34.0*        CMP:   Recent Labs   Lab 08/15/20  1354      K 3.6      CO2 27   *   BUN 22   CREATININE 1.2   CALCIUM 8.7   PROT 6.4   ALBUMIN 3.5   BILITOT 0.8   ALKPHOS 61   AST 25   ALT 19   ANIONGAP 8   EGFRNONAA 56.4*       Significant Imaging: I have reviewed all pertinent imaging results/findings within the past 24 hours.

## 2020-08-16 NOTE — PROGRESS NOTES
[facility] Medicine  Progress Note    Patient Name: Tomasz Chavez  MRN: [id number]  Patient Class: IP- Inpatient   Admission Date: 8/15/2020  Length of Stay: 1 days  Attending Physician: Casey Tsai MD  Primary Care Provider: Kendrick Hatfield MD        Subjective:     Principal Problem:Closed displaced fracture of left femoral neck        HPI:  79 year old patient admitted with  Acute subcapital fracture of left femoral neck, with mild impaction  Patient was walking around today and had a fall  Later he felt severe pain on L hip area and was unable to move LLE  Patient described pain as severe/constant/with no radiation  Also he was unable to support his weight       Overview/Hospital Course:  08/16  Pt today had Left hip hemiarthroplasty  No other issues  BP well controlled    Interval History:     Review of Systems   Constitutional: Negative for activity change and appetite change.   HENT: Negative for congestion and dental problem.    Eyes: Negative for discharge and itching.   Respiratory: Negative for shortness of breath.    Cardiovascular: Negative for chest pain.   Gastrointestinal: Negative for abdominal distention and abdominal pain.   Endocrine: Negative for cold intolerance.   Genitourinary: Negative for difficulty urinating and dysuria.   Musculoskeletal: Negative for arthralgias and back pain.   Skin: Negative for color change.   Neurological: Negative for dizziness and facial asymmetry.   Hematological: Negative for adenopathy.   Psychiatric/Behavioral: Negative for agitation and behavioral problems.     Objective:     Vital Signs (Most Recent):  Temp: 97.7 °F (36.5 °C) (08/16/20 1546)  Pulse: 74 (08/16/20 1546)  Resp: 18 (08/16/20 1546)  BP: 131/65 (08/16/20 1546)  SpO2: 96 % (08/16/20 1546) Vital Signs (24h Range):  Temp:  [97.3 °F (36.3 °C)-99.2 °F (37.3 °C)] 97.7 °F (36.5 °C)  Pulse:  [53-77] 74  Resp:  [14-20] 18  SpO2:  [96 %-100 %] 96 %  BP: (106-173)/(53-81) 131/65      Weight: 74.8 kg (165 lb)  Body mass index is 25.09 kg/m².    Intake/Output Summary (Last 24 hours) at 8/16/2020 1839  Last data filed at 8/16/2020 1800  Gross per 24 hour   Intake 3000 ml   Output 915 ml   Net 2085 ml      Physical Exam  Vitals signs and nursing note reviewed.   Constitutional:       Appearance: He is well-developed.   HENT:      Head: Normocephalic and atraumatic.      Nose: Nose normal.      Mouth/Throat:      Mouth: Mucous membranes are moist.   Eyes:      Pupils: Pupils are equal, round, and reactive to light.   Neck:      Musculoskeletal: Full passive range of motion without pain and normal range of motion.   Cardiovascular:      Rate and Rhythm: Normal rate and regular rhythm.   Pulmonary:      Effort: Pulmonary effort is normal.      Breath sounds: Normal breath sounds.   Abdominal:      General: Bowel sounds are normal.      Palpations: Abdomen is soft.   Musculoskeletal: Normal range of motion.      Comments: LLE hip area limited movements   Skin:     General: Skin is warm.   Neurological:      Mental Status: He is alert and oriented to person, place, and time.   Psychiatric:         Mood and Affect: Mood normal.         Significant Labs:   CBC:   Recent Labs   Lab 08/15/20  1354 08/16/20  0702   WBC 8.37 5.86   HGB 11.1* 10.5*   HCT 34.0* 32.1*    195     CMP:   Recent Labs   Lab 08/15/20  1354 08/16/20  0702    139   K 3.6 4.0    102   CO2 27 29   * 101   BUN 22 27*   CREATININE 1.2 1.4   CALCIUM 8.7 8.6*   PROT 6.4 6.0   ALBUMIN 3.5 3.3*   BILITOT 0.8 1.3*   ALKPHOS 61 53*   AST 25 19   ALT 19 18   ANIONGAP 8 8   EGFRNONAA 56.4* 46.8*             Assessment/Plan:      * Closed displaced fracture of left femoral neck  S/p  Left hip hemiarthroplasty    HTN (hypertension)  Continue present Tx      DNR (do not resuscitate)  DNR      Dementia  Stable issue      CKD (chronic kidney disease), stage III  Chronic stable issue        VTE Risk Mitigation (From  admission, onward)         Ordered     IP VTE HIGH RISK PATIENT  Once      08/15/20 1410                Discharge Planning   RACHEL:      Code Status: DNR   Is the patient medically ready for discharge?:     Reason for patient still in hospital (select all that apply): Treatment                     Casey Tsai MD  Department of Hospital Medicine   [facility]

## 2020-08-16 NOTE — ANESTHESIA PROCEDURE NOTES
Intubation  Performed by: Charli Colon CRNA  Authorized by: Arsen Jurado MD     Intubation:     Induction:  Intravenous    Intubated:  Postinduction    Mask Ventilation:  Easy mask    Attempts:  1    Attempted By:  CRNA    Method of Intubation:  Direct    Blade:  Suárez 2    Laryngeal View Grade: Grade IIA - cords partially seen      Difficult Airway Encountered?: No      Complications:  None    Airway Device:  Oral endotracheal tube    Airway Device Size:  7.5    Style/Cuff Inflation:  Cuffed (inflated to minimal occlusive pressure)    Tube secured:  23    Secured at:  The lips    Placement Verified By:  Capnometry    Complicating Factors:  None    Findings Post-Intubation:  BS equal bilateral and atraumatic/condition of teeth unchanged

## 2020-08-16 NOTE — ED PROVIDER NOTES
Encounter Date: 8/15/2020       History     Chief Complaint   Patient presents with    Hip Pain     c/o L hip pain s/p fall. unwitnessed.      Patient here with reported left hip pain after fall patient has a history of dementia so he is unable to participate in his medical history therefore history is obtained per EMS apparently patient fell this morning he does have an apparent skin tear to the right elbow but no other noted injuries    The history is provided by the EMS personnel. The history is limited by the condition of the patient.     Review of patient's allergies indicates:  No Known Allergies  Past Medical History:   Diagnosis Date    Dementia     GERD (gastroesophageal reflux disease)     Hypertension     Sleep apnea     does not use machine    Wears glasses      Past Surgical History:   Procedure Laterality Date    APPENDECTOMY      CHOLECYSTECTOMY      FINGER SURGERY      laceration    HERNIA REPAIR       Family History   Problem Relation Age of Onset    Hypertension Father      Social History     Tobacco Use    Smoking status: Former Smoker     Quit date:      Years since quittin.6   Substance Use Topics    Alcohol use: No    Drug use: No     Review of Systems   Unable to perform ROS: Dementia       Physical Exam     Initial Vitals [08/15/20 1159]   BP Pulse Resp Temp SpO2   (!) 188/93 (!) 55 17 97.9 °F (36.6 °C) 99 %      MAP       --         Physical Exam    Constitutional: He appears well-developed and well-nourished. He appears distressed.   HENT:   Head: Normocephalic and atraumatic.   Right Ear: External ear normal.   Left Ear: External ear normal.   Mouth/Throat: Oropharynx is clear and moist.   TMs clear   Eyes: Conjunctivae and EOM are normal. Pupils are equal, round, and reactive to light.   Neck: Normal range of motion. Neck supple.   No palpable midline tenderness or deformity   Cardiovascular: Normal rate, regular rhythm, normal heart sounds and intact distal pulses.    Pulmonary/Chest: Breath sounds normal. No respiratory distress. He has no wheezes. He has no rales.   Abdominal: Soft. Bowel sounds are normal. He exhibits no distension. There is no abdominal tenderness.   Musculoskeletal: No edema.      Comments: Legs held in flexion pain with range of motion of the left hip 2+ dorsalis pedis and posterior tibial pulses bilaterally no palpable crepitance noted mild tenderness noted to palpation of the left hip   Lymphadenopathy:     He has no cervical adenopathy.   Neurological: He is alert.   Skin: Skin is warm and dry. Capillary refill takes less than 2 seconds.   Skin tear noted to the right elbow measuring approximately 2 cm   Psychiatric:   Confused flat affect         ED Course   Procedures  Labs Reviewed   APTT - Abnormal; Notable for the following components:       Result Value    aPTT 34.2 (*)     All other components within normal limits   CBC W/ AUTO DIFFERENTIAL - Abnormal; Notable for the following components:    RBC 3.35 (*)     Hemoglobin 11.1 (*)     Hematocrit 34.0 (*)     Mean Corpuscular Volume 102 (*)     Mean Corpuscular Hemoglobin 33.1 (*)     Lymph # 0.6 (*)     Gran% 89.2 (*)     Lymph% 7.6 (*)     Mono% 3.1 (*)     All other components within normal limits   COMPREHENSIVE METABOLIC PANEL - Abnormal; Notable for the following components:    Glucose 119 (*)     eGFR if non  56.4 (*)     All other components within normal limits   MAGNESIUM   PROTIME-INR   SARS-COV-2 RNA AMPLIFICATION, QUAL        ECG Results          EKG 12-lead (In process)  Result time 08/15/20 16:19:42    In process by Interface, Lab In [facility] (08/15/20 16:19:42)                 Narrative:    Test Reason : W19.XXXA,    Vent. Rate : 054 BPM     Atrial Rate : 054 BPM     P-R Int : 298 ms          QRS Dur : 076 ms      QT Int : 486 ms       P-R-T Axes : 042 -23 019 degrees     QTc Int : 460 ms    Sinus bradycardia with 1st degree A-V block with frequent  Premature  ventricular complexes  Otherwise normal ECG  When compared with ECG of 09-AUG-2020 18:37,  Criteria for Septal infarct are no longer Present    Referred By: [email or web address]   SELF           Confirmed By:                   In process by Interface, Lab In [facility] (08/15/20 12:31:16)                 Narrative:    Test Reason : W19.XXXA,    Vent. Rate : 054 BPM     Atrial Rate : 054 BPM     P-R Int : 298 ms          QRS Dur : 076 ms      QT Int : 486 ms       P-R-T Axes : 042 -23 019 degrees     QTc Int : 460 ms    Sinus bradycardia with 1st degree A-V block with frequent Premature  ventricular complexes  Otherwise normal ECG  When compared with ECG of 09-AUG-2020 18:37,  Criteria for Septal infarct are no longer Present    Referred By: [email or web address]   SELF           Confirmed By:                             Imaging Results          X-Ray Chest AP Portable (Final result)  Result time 08/15/20 13:07:35    Final result by Alvin Batista MD (08/15/20 13:07:35)                 Narrative:    XR CHEST AP PORTABLE    CLINICAL HISTORY:  79 years Male Fall    COMPARISON: August 9, 2020    FINDINGS: Cardiac silhouette size is within normal limits.  Atherosclerotic calcification of the aorta. No confluent airspace  disease. No large pleural effusion or pneumothorax. No acute osseous  abnormality.    There is soft tissue gas in the left axillary region which could be  within the left axillary fold. Consider left rib radiography is  patient is symptomatic here.    IMPRESSION:    No acute pulmonary process.    Left axillary region soft tissue gas.    Electronically Signed by Alvin HECK on 8/15/2020 1:45 PM                             X-Ray Hip 2 or 3 views Left (Final result)  Result time 08/15/20 13:06:35    Final result by Alvin Batista MD (08/15/20 13:06:35)                 Narrative:    XR HIP 2 VIEW LEFT    CLINICAL HISTORY:  79 years Male fall    COMPARISON: None    FINDINGS: Acute subcapital fracture of the  left femoral neck which  appears mildly impacted, with slight varus angulation. No  intertrochanteric involvement. Soft tissues are unremarkable.    IMPRESSION:    Acute subcapital fracture of left femoral neck, with mild impaction.    Electronically Signed by Alvin HECK on 8/15/2020 1:42 PM                               Medical Decision Making:   ED Management:  Patient's x-rays reveal evidence of a femoral neck fracture did left hip I have discussed the radiographic findings with Dr. Brown I have begun preop laboratory assessment and perform chest x-ray an EKG I have discussed the case with Dr. Tsai who will evaluate patient emergency department for admission                                 Clinical Impression:       ICD-10-CM ICD-9-CM   1. Closed fracture of hip, unspecified laterality, initial encounter  S72.009A 820.8   2. Fall  W19.XXXA E888.9   3. Preop cardiovascular exam  Z01.810 V72.81   4. Closed displaced fracture of left femoral neck  S72.002A 820.8             ED Disposition Condition    Admit                           Zaid Germain MD  08/16/20 0717

## 2020-08-16 NOTE — BRIEF OP NOTE
[facility]  Brief Operative Note    SUMMARY     Surgery Date: 8/16/2020     Surgeon(s) and Role:     * Rubio Brown MD - Primary    Assisting Surgeon: None    Pre-op Diagnosis:  Closed displaced fracture of left femoral neck [S72.002A]    Post-op Diagnosis:  Post-Op Diagnosis Codes:     * Closed displaced fracture of left femoral neck [S72.002A]    Procedure(s) (LRB):  HEMIARTHROPLASTY, HIP (Left)    Anesthesia: General    Description of Procedure: left hip hemiarthroplasty    Description of the findings of the procedure: See Dictation     Estimated Blood Loss: 250 mL         Specimens:   Specimen (12h ago, onward)    None

## 2020-08-16 NOTE — ANESTHESIA PREPROCEDURE EVALUATION
08/16/2020  Tomasz Chavez is a 79 y.o., male.    Patient Active Problem List   Diagnosis    Calculus of gallbladder without cholecystitis without obstruction    CKD (chronic kidney disease), stage III    Dementia    DNR (do not resuscitate)    Closed fracture of hip    HTN (hypertension)       Past Surgical History:   Procedure Laterality Date    APPENDECTOMY      CHOLECYSTECTOMY      FINGER SURGERY      laceration    HERNIA REPAIR          Tobacco Use:  The patient  reports that he quit smoking about 40 years ago. He does not have any smokeless tobacco history on file.     Results for orders placed or performed during the hospital encounter of 08/15/20   EKG 12-lead    Collection Time: 08/15/20 12:16 PM    Narrative    Test Reason : W19.XXXA,    Vent. Rate : 054 BPM     Atrial Rate : 054 BPM     P-R Int : 298 ms          QRS Dur : 076 ms      QT Int : 486 ms       P-R-T Axes : 042 -23 019 degrees     QTc Int : 460 ms    Sinus bradycardia with 1st degree A-V block with frequent Premature  ventricular complexes  Otherwise normal ECG  When compared with ECG of 09-AUG-2020 18:37,  Criteria for Septal infarct are no longer Present    Referred By: [email or web address]   SELF           Confirmed By:         Imaging Results          X-Ray Chest AP Portable (Final result)  Result time 08/15/20 13:07:35    Final result by Alvin Batista MD (08/15/20 13:07:35)                 Narrative:    XR CHEST AP PORTABLE    CLINICAL HISTORY:  79 years Male Fall    COMPARISON: August 9, 2020    FINDINGS: Cardiac silhouette size is within normal limits.  Atherosclerotic calcification of the aorta. No confluent airspace  disease. No large pleural effusion or pneumothorax. No acute osseous  abnormality.    There is soft tissue gas in the left axillary region which could be  within the left axillary fold. Consider left rib  radiography is  patient is symptomatic here.    IMPRESSION:    No acute pulmonary process.    Left axillary region soft tissue gas.    Electronically Signed by Alvin HECK on 8/15/2020 1:45 PM                             X-Ray Hip 2 or 3 views Left (Final result)  Result time 08/15/20 13:06:35    Final result by Alvin Batista MD (08/15/20 13:06:35)                 Narrative:    XR HIP 2 VIEW LEFT    CLINICAL HISTORY:  79 years Male fall    COMPARISON: None    FINDINGS: Acute subcapital fracture of the left femoral neck which  appears mildly impacted, with slight varus angulation. No  intertrochanteric involvement. Soft tissues are unremarkable.    IMPRESSION:    Acute subcapital fracture of left femoral neck, with mild impaction.    Electronically Signed by Alvin HECK on 8/15/2020 1:42 PM                           Transthoracic echo (TTE) complete  Order# [id number]  Reading physician: Mary Phillips MD Ordering physician: Ramya Cervantes NP Study date: 7/10/20   Reason for Exam  Priority: Routine  Dx: Symptomatic bradycardia [R00.1 (ICD-10-CM)]   Comments:    Staff    Performing Sonographer   Tari Naidu    Vitals    Height Weight BMI (Calculated) BSA (Calculated - sq m) BP             Conclusion    · Concentric left ventricular hypertrophy.  · Normal left ventricular systolic function. The estimated ejection fraction is 65%.  · Normal LV diastolic function.  · Normal right ventricular systolic function.  · The aortic root is mildly dilated. Trace aortic regurgitation.  · Mild mitral regurgitation.  · Mild pulmonic regurgitation.         Lab Results   Component Value Date    WBC 8.37 08/15/2020    HGB 11.1 (L) 08/15/2020    HCT 34.0 (L) 08/15/2020     (H) 08/15/2020     08/15/2020     BMP  Lab Results   Component Value Date     08/15/2020    K 3.6 08/15/2020     08/15/2020    CO2 27 08/15/2020    BUN 22 08/15/2020    CREATININE 1.2 08/15/2020    CALCIUM 8.7  08/15/2020    ANIONGAP 8 08/15/2020     (H) 08/15/2020     (H) 08/11/2020     08/10/2020       Results for orders placed during the hospital encounter of 08/09/20   Echo Color Flow Doppler? No; Limited Follow-Up Exam? Yes; Bubble Contrast? Yes    Narrative 1.  Saline bubble study did not reveal PFO.           Anesthesia Evaluation    I have reviewed the Patient Summary Reports.     I have reviewed the Nursing Notes. I have reviewed the NPO Status.   I have reviewed the Medications.     Review of Systems  Anesthesia Hx:  No problems with previous Anesthesia  Denies Family Hx of Anesthesia complications.   Denies Personal Hx of Anesthesia complications.   Social:  Former Smoker    Hematology/Oncology:     Oncology Normal    -- Anemia:   EENT/Dental:EENT/Dental Normal   Cardiovascular:   Hypertension, well controlled ECG has been reviewed.    Pulmonary:   Sleep Apnea, CPAP  Obstructive Sleep Apnea (DELORIS) (does not use CPAP).   Education provided regarding risk of obstructive sleep apnea     Renal/:   Chronic Renal Disease (stage 3), CRI CKD (chronic kidney disease), stage III   Hepatic/GI:   GERD, well controlled    Musculoskeletal:  Musculoskeletal Normal    Neurological:  Dementia (severe dementia) severe    Endocrine:  Endocrine Normal    Dermatological:  Skin Normal    Psych:   Psychiatric History          Physical Exam  General:  Well nourished    Airway/Jaw/Neck:  Airway Findings: Mouth Opening: Normal Tongue: Normal  General Airway Assessment: Adult  Mallampati: II  TM Distance: Normal, at least 6 cm  Jaw/Neck Findings:  Neck ROM: Normal ROM     Eyes/Ears/Nose:  Eyes/Ears/Nose Findings:    Dental:  Dental Findings: In tact   Chest/Lungs:  Chest/Lungs Findings: Clear to auscultation, Normal Respiratory Rate     Heart/Vascular:  Heart Findings: Rate: Normal  Rhythm: Regular Rhythm  Sounds: Normal     Abdomen:  Abdomen Findings:     Musculoskeletal:  Musculoskeletal Findings:    Skin:  Skin  Findings:     Mental Status:  Mental Status Findings:  Cooperative, Alert and Oriented         Anesthesia Plan  Type of Anesthesia, risks & benefits discussed:  Anesthesia Type:  general  Patient's Preference:   Intra-op Monitoring Plan: standard ASA monitors  Intra-op Monitoring Plan Comments:   Post Op Pain Control Plan: multimodal analgesia  Post Op Pain Control Plan Comments:   Induction:   IV  Beta Blocker:  Patient is on a Beta-Blocker and has received one dose within the past 24 hours (No further documentation required).       Informed Consent: Patient understands risks and agrees with Anesthesia plan.  Questions answered. Anesthesia consent signed with patient.  ASA Score: 3     Day of Surgery Review of History & Physical:    H&P update referred to the surgeon.     Anesthesia Plan Notes: GETA.  No versed, reduce narcotic dose.  Sleep apnea precaution.  Multimodal analgesia with ofirmev 1000 mg and decadron 8 mg.          Ready For Surgery From Anesthesia Perspective.

## 2020-08-17 PROBLEM — N17.9 AKI (ACUTE KIDNEY INJURY): Status: ACTIVE | Noted: 2020-08-17

## 2020-08-17 PROBLEM — Z79.01 ANTICOAGULATED: Status: ACTIVE | Noted: 2020-08-17

## 2020-08-17 LAB
ALBUMIN SERPL BCP-MCNC: 2.8 G/DL (ref 3.5–5.2)
ALP SERPL-CCNC: 47 U/L (ref 55–135)
ALT SERPL W/O P-5'-P-CCNC: 14 U/L (ref 10–44)
ANION GAP SERPL CALC-SCNC: 10 MMOL/L (ref 8–16)
ANION GAP SERPL CALC-SCNC: 10 MMOL/L (ref 8–16)
AST SERPL-CCNC: 39 U/L (ref 10–40)
BASOPHILS # BLD AUTO: 0 K/UL (ref 0–0.2)
BASOPHILS # BLD AUTO: 0 K/UL (ref 0–0.2)
BASOPHILS NFR BLD: 0 % (ref 0–1.9)
BASOPHILS NFR BLD: 0 % (ref 0–1.9)
BILIRUB SERPL-MCNC: 0.6 MG/DL (ref 0.1–1)
BUN SERPL-MCNC: 35 MG/DL (ref 8–23)
BUN SERPL-MCNC: 35 MG/DL (ref 8–23)
CALCIUM SERPL-MCNC: 8.3 MG/DL (ref 8.7–10.5)
CALCIUM SERPL-MCNC: 8.3 MG/DL (ref 8.7–10.5)
CHLORIDE SERPL-SCNC: 101 MMOL/L (ref 95–110)
CHLORIDE SERPL-SCNC: 101 MMOL/L (ref 95–110)
CO2 SERPL-SCNC: 26 MMOL/L (ref 23–29)
CO2 SERPL-SCNC: 26 MMOL/L (ref 23–29)
CREAT SERPL-MCNC: 1.7 MG/DL (ref 0.5–1.4)
CREAT SERPL-MCNC: 1.7 MG/DL (ref 0.5–1.4)
DIFFERENTIAL METHOD: ABNORMAL
DIFFERENTIAL METHOD: ABNORMAL
EOSINOPHIL # BLD AUTO: 0 K/UL (ref 0–0.5)
EOSINOPHIL # BLD AUTO: 0 K/UL (ref 0–0.5)
EOSINOPHIL NFR BLD: 0.2 % (ref 0–8)
EOSINOPHIL NFR BLD: 0.2 % (ref 0–8)
ERYTHROCYTE [DISTWIDTH] IN BLOOD BY AUTOMATED COUNT: 14 % (ref 11.5–14.5)
ERYTHROCYTE [DISTWIDTH] IN BLOOD BY AUTOMATED COUNT: 14 % (ref 11.5–14.5)
EST. GFR  (AFRICAN AMERICAN): 42.8 ML/MIN/1.73 M^2
EST. GFR  (AFRICAN AMERICAN): 42.8 ML/MIN/1.73 M^2
EST. GFR  (NON AFRICAN AMERICAN): 37 ML/MIN/1.73 M^2
EST. GFR  (NON AFRICAN AMERICAN): 37 ML/MIN/1.73 M^2
GLUCOSE SERPL-MCNC: 122 MG/DL (ref 70–110)
GLUCOSE SERPL-MCNC: 122 MG/DL (ref 70–110)
HCT VFR BLD AUTO: 27.6 % (ref 40–54)
HCT VFR BLD AUTO: 27.6 % (ref 40–54)
HGB BLD-MCNC: 9 G/DL (ref 14–18)
HGB BLD-MCNC: 9 G/DL (ref 14–18)
IMM GRANULOCYTES # BLD AUTO: 0.03 K/UL (ref 0–0.04)
IMM GRANULOCYTES # BLD AUTO: 0.03 K/UL (ref 0–0.04)
IMM GRANULOCYTES NFR BLD AUTO: 0.4 % (ref 0–0.5)
IMM GRANULOCYTES NFR BLD AUTO: 0.4 % (ref 0–0.5)
INR PPP: 1.5
LYMPHOCYTES # BLD AUTO: 0.9 K/UL (ref 1–4.8)
LYMPHOCYTES # BLD AUTO: 0.9 K/UL (ref 1–4.8)
LYMPHOCYTES NFR BLD: 11.6 % (ref 18–48)
LYMPHOCYTES NFR BLD: 11.6 % (ref 18–48)
MAGNESIUM SERPL-MCNC: 1.8 MG/DL (ref 1.6–2.6)
MCH RBC QN AUTO: 33.7 PG (ref 27–31)
MCH RBC QN AUTO: 33.7 PG (ref 27–31)
MCHC RBC AUTO-ENTMCNC: 32.6 G/DL (ref 32–36)
MCHC RBC AUTO-ENTMCNC: 32.6 G/DL (ref 32–36)
MCV RBC AUTO: 103 FL (ref 82–98)
MCV RBC AUTO: 103 FL (ref 82–98)
MONOCYTES # BLD AUTO: 0.3 K/UL (ref 0.3–1)
MONOCYTES # BLD AUTO: 0.3 K/UL (ref 0.3–1)
MONOCYTES NFR BLD: 3.3 % (ref 4–15)
MONOCYTES NFR BLD: 3.3 % (ref 4–15)
NEUTROPHILS # BLD AUTO: 6.8 K/UL (ref 1.8–7.7)
NEUTROPHILS # BLD AUTO: 6.8 K/UL (ref 1.8–7.7)
NEUTROPHILS NFR BLD: 84.5 % (ref 38–73)
NEUTROPHILS NFR BLD: 84.5 % (ref 38–73)
NRBC BLD-RTO: 0 /100 WBC
NRBC BLD-RTO: 0 /100 WBC
PLATELET # BLD AUTO: 171 K/UL (ref 150–350)
PLATELET # BLD AUTO: 171 K/UL (ref 150–350)
PMV BLD AUTO: 11.1 FL (ref 9.2–12.9)
PMV BLD AUTO: 11.1 FL (ref 9.2–12.9)
POTASSIUM SERPL-SCNC: 3.7 MMOL/L (ref 3.5–5.1)
POTASSIUM SERPL-SCNC: 3.7 MMOL/L (ref 3.5–5.1)
PROT SERPL-MCNC: 5.4 G/DL (ref 6–8.4)
PROTHROMBIN TIME: 17.2 SEC (ref 10.6–14.8)
RBC # BLD AUTO: 2.67 M/UL (ref 4.6–6.2)
RBC # BLD AUTO: 2.67 M/UL (ref 4.6–6.2)
SODIUM SERPL-SCNC: 137 MMOL/L (ref 136–145)
SODIUM SERPL-SCNC: 137 MMOL/L (ref 136–145)
WBC # BLD AUTO: 8.1 K/UL (ref 3.9–12.7)
WBC # BLD AUTO: 8.1 K/UL (ref 3.9–12.7)

## 2020-08-17 PROCEDURE — 97530 THERAPEUTIC ACTIVITIES: CPT

## 2020-08-17 PROCEDURE — 63600175 PHARM REV CODE 636 W HCPCS: Performed by: INTERNAL MEDICINE

## 2020-08-17 PROCEDURE — 97162 PT EVAL MOD COMPLEX 30 MIN: CPT

## 2020-08-17 PROCEDURE — 25000003 PHARM REV CODE 250: Performed by: INTERNAL MEDICINE

## 2020-08-17 PROCEDURE — 12000002 HC ACUTE/MED SURGE SEMI-PRIVATE ROOM

## 2020-08-17 PROCEDURE — 83735 ASSAY OF MAGNESIUM: CPT

## 2020-08-17 PROCEDURE — 85025 COMPLETE CBC W/AUTO DIFF WBC: CPT

## 2020-08-17 PROCEDURE — 85610 PROTHROMBIN TIME: CPT

## 2020-08-17 PROCEDURE — 63600175 PHARM REV CODE 636 W HCPCS: Performed by: ORTHOPAEDIC SURGERY

## 2020-08-17 PROCEDURE — 97116 GAIT TRAINING THERAPY: CPT

## 2020-08-17 PROCEDURE — 97166 OT EVAL MOD COMPLEX 45 MIN: CPT

## 2020-08-17 PROCEDURE — 36415 COLL VENOUS BLD VENIPUNCTURE: CPT

## 2020-08-17 PROCEDURE — 80053 COMPREHEN METABOLIC PANEL: CPT

## 2020-08-17 PROCEDURE — 25000003 PHARM REV CODE 250: Performed by: ORTHOPAEDIC SURGERY

## 2020-08-17 RX ORDER — HYDRALAZINE HYDROCHLORIDE 25 MG/1
25 TABLET, FILM COATED ORAL EVERY 8 HOURS
Status: DISCONTINUED | OUTPATIENT
Start: 2020-08-17 | End: 2020-08-26 | Stop reason: HOSPADM

## 2020-08-17 RX ORDER — ENOXAPARIN SODIUM 100 MG/ML
30 INJECTION SUBCUTANEOUS
Status: DISCONTINUED | OUTPATIENT
Start: 2020-08-17 | End: 2020-08-17

## 2020-08-17 RX ORDER — ENOXAPARIN SODIUM 100 MG/ML
40 INJECTION SUBCUTANEOUS
Status: DISCONTINUED | OUTPATIENT
Start: 2020-08-17 | End: 2020-08-26 | Stop reason: HOSPADM

## 2020-08-17 RX ADMIN — MORPHINE SULFATE 2 MG: 2 INJECTION, SOLUTION INTRAMUSCULAR; INTRAVENOUS at 05:08

## 2020-08-17 RX ADMIN — ENOXAPARIN SODIUM 40 MG: 100 INJECTION SUBCUTANEOUS at 05:08

## 2020-08-17 RX ADMIN — ISOSORBIDE MONONITRATE 60 MG: 60 TABLET, EXTENDED RELEASE ORAL at 09:08

## 2020-08-17 RX ADMIN — HYDRALAZINE HYDROCHLORIDE 25 MG: 25 TABLET, FILM COATED ORAL at 08:08

## 2020-08-17 RX ADMIN — HYDROCODONE BITARTRATE AND ACETAMINOPHEN 1 TABLET: 10; 325 TABLET ORAL at 08:08

## 2020-08-17 RX ADMIN — ATORVASTATIN CALCIUM 20 MG: 20 TABLET, FILM COATED ORAL at 08:08

## 2020-08-17 RX ADMIN — CEFAZOLIN SODIUM 1 G: 1 SOLUTION INTRAVENOUS at 06:08

## 2020-08-17 RX ADMIN — HYDROCODONE BITARTRATE AND ACETAMINOPHEN 1 TABLET: 5; 325 TABLET ORAL at 02:08

## 2020-08-17 RX ADMIN — HYDROCODONE BITARTRATE AND ACETAMINOPHEN 1 TABLET: 10; 325 TABLET ORAL at 09:08

## 2020-08-17 RX ADMIN — CEFAZOLIN SODIUM 1 G: 1 SOLUTION INTRAVENOUS at 02:08

## 2020-08-17 NOTE — PT/OT/SLP EVAL
Physical Therapy Evaluation    Patient Name:  Tomasz Chavez   MRN:  [id number]    Recommendations:     Discharge Recommendations:  nursing facility, skilled   Discharge Equipment Recommendations: walker, rolling   Barriers to discharge: pt decreased cognitive function    Assessment:     Tomasz Chavez is a 79 y.o. male admitted with a medical diagnosis of Closed displaced fracture of left femoral neck.  He presents with the following impairments/functional limitations:  weakness, impaired endurance, impaired self care skills, impaired functional mobilty, gait instability, impaired balance, impaired cognition, decreased coordination, decreased lower extremity function, decreased safety awareness, pain, impaired coordination, impaired skin, impaired cardiopulmonary response to activity, impaired joint extensibility, orthopedic precautions; pt will benefit from skilled acute PT services to improve current level of functional mob and improve overall capacity to perform activities.    Rehab Prognosis: Good; patient would benefit from acute skilled PT services to address these deficits and reach maximum level of function.    Recent Surgery: Procedure(s) (LRB):  HEMIARTHROPLASTY, HIP (Left) 1 Day Post-Op    Plan:     During this hospitalization, patient to be seen daily to address the identified rehab impairments via gait training, therapeutic activities, therapeutic exercises and progress toward the following goals:    · Plan of Care Expires:  09/15/20    Subjective     Chief Complaint: pain to (L) hip, fearful to get up initially  Patient/Family Comments/goals: Pt wanted to get better, cooperative with PT though confused  Pain/Comfort:  · Pain Rating 1: 3/10  · Location - Side 1: Left  · Location 1: hip  · Pain Addressed 1: Reposition, Nurse notified  · Pain Rating Post-Intervention 1: 3/10    Patients cultural, spiritual, Taoist conflicts given the current situation: no    Living Environment:  Pt is a poor  historian but OT informed PT that pt lives alone but the son lives nearby  Prior to admission, patients level of function was unknown, likely Mod (I).  Equipment used at home: (unknown as pt is a poor historian).  DME owned (not currently used): unknown pt is a poor historian.  Upon discharge, patient will have assistance from family; see [facility] notes for details.    Objective:     Communicated with SANDIP Araya prior to session.  Patient found supine with bed alarm, peripheral IV(sitter at bedside)  upon PT entry to room.    General Precautions: Standard, fall   Orthopedic Precautions:LLE weight bearing as tolerated, LLE posterior precautions   Braces:       Exams:  · Cognitive Exam:  Patient is oriented to alert, disoriented x 4  · Gross Motor Coordination:  impaired  · Postural Exam:  Patient presented with the following abnormalities:    · -       Rounded shoulders  · -       Forward head  · RLE ROM: WFL  · RLE Strength: WFL  · LLE ROM: Deficits: limited hip motion by 50% due to pain with empty end-feel  · LLE Strength: graded 3-/5 to hip; 3/5 to 3+/5 to knee and ankle    Functional Mobility:  · Bed Mobility:     · Rolling Left:  minimum assistance  · Rolling Right: minimum assistance  · Scooting: minimum assistance  · Supine to Sit: moderate assistance  · Sit to Supine: moderate assistance  · Transfers:     · Sit to Stand:  moderate assistance with rolling walker  · Gait: 14 ft RW, min A, WBAT (L)LE with hip precautions  · Balance: Siting: G; Standing: F    Therapeutic Activities and Exercises:   Functional mobility training as above; pt educated on safety precautions and mobility techniques, as well as the role and benefits of PT and mobilization.    AM-PAC 6 CLICK MOBILITY  Total Score:13     Patient left HOB elevated with all lines intact, call button in reach, bed alarm on, RN notified and sitter Margarita present.    GOALS:   Multidisciplinary Problems     Physical Therapy Goals        Problem: Physical Therapy  Goal    Goal Priority Disciplines Outcome Goal Variances Interventions   Physical Therapy Goal     PT, PT/OT      Description: Goals to be met by: 9/15/20     Patient will increase functional independence with mobility by performin. Supine to sit with Stand-by Assistance  2. Sit to stand transfer with Minimal Assistance  3. Gait  x 150 feet with Minimal Assistance using Rolling Walker.   4. Lower extremity exercise program x20-30 reps per handout, with supervision                     History:     Past Medical History:   Diagnosis Date    Dementia     GERD (gastroesophageal reflux disease)     Hypertension     Sleep apnea     does not use machine    Wears glasses        Past Surgical History:   Procedure Laterality Date    APPENDECTOMY      CHOLECYSTECTOMY      FINGER SURGERY      laceration    HEMIARTHROPLASTY OF HIP Left 2020    Procedure: HEMIARTHROPLASTY, HIP;  Surgeon: Rubio Brown MD;  Location[facility];  Service: Orthopedics;  Laterality: Left;    HERNIA REPAIR         Time Tracking:     PT Received On: 20  PT Start Time: 0940     PT Stop Time: 1025  PT Total Time (min): 45 min     Billable Minutes: Evaluation 12, Gait Training 15 and Therapeutic Activity 16      Memo Lr, PT  2020

## 2020-08-17 NOTE — NURSING
Informed Dr. Chavez, pt very restless in bed. pt hollaring out for momma, pt 79 yrs old. pt had hip surgery today. pt pulled off his surgical dressing. pt keeps pulling on brock cath tubing causing bright red urine in tubing. pt pulling off telemetry monitoring, keeps taking gown off. can we get something to help relax pt. thanks

## 2020-08-17 NOTE — PT/OT/SLP EVAL
Occupational Therapy   Evaluation    Name: Tomasz Chavez  MRN: [id number]  Admitting Diagnosis:  Closed displaced fracture of left femoral neck 1 Day Post-Op    Recommendations:     Discharge Recommendations: nursing facility, skilled  Discharge Equipment Recommendations:  (TBD next level of care)  Barriers to discharge:  Decreased caregiver support    Assessment:     Tomasz Chavez is a 79 y.o. male with a medical diagnosis of Closed displaced fracture of left femoral neck.  Pt agreeable to OT evaluation this AM. Performance deficits affecting function: weakness, impaired endurance, impaired self care skills, impaired functional mobilty, gait instability, impaired balance, impaired cognition, decreased coordination, decreased lower extremity function, decreased safety awareness, pain, decreased ROM, orthopedic precautions.  Pt oriented to person only, sitter present at bedside. Pt with increased confusion during session, with patient requiring constant cues that he had a brock and the purpose of it. Pt unable to answer PLOF questions, but from chart review, pt lives alone but nextdoor to his son. Rec SNF at d/c.     Rehab Prognosis: Good; patient would benefit from acute skilled OT services to address these deficits and reach maximum level of function.       Plan:     Patient to be seen 6 x/week to address the above listed problems via self-care/home management, therapeutic activities, therapeutic exercises  · Plan of Care Expires: 09/17/20  · Plan of Care Reviewed with: patient    Subjective     Chief Complaint: LLE pain  Patient/Family Comments/goals: none stated    Occupational Profile:  Living Environment: Unknown, as patient was unable to answer the questions.  Previous level of function: Unknown  Roles and Routines: unkonwn  Equipment Used at Home:  (Unknown, pt confused and no family at bedside)  Assistance upon Discharge: yes, from son    Pain/Comfort:  · Pain Rating 1: (not rated)  · Location - Side 1:  Left  · Location 1: leg  · Pain Addressed 1: Reposition, Cessation of Activity, Distraction    Patients cultural, spiritual, Mosque conflicts given the current situation:      Objective:     Communicated with: nursing prior to session.  Patient found HOB elevated with brock catheter, telemetry, peripheral IV, bed alarm(sitter) upon OT entry to room.    General Precautions: Standard, fall   Orthopedic Precautions:LLE weight bearing as tolerated   Braces: N/A     Occupational Performance:    Bed Mobility:    · Patient completed Scooting/Bridging with total assistance and 2 persons  · Patient completed Supine to Sit with moderate assistance  · Patient completed Sit to Supine with moderate assistance and 2 persons    Functional Mobility/Transfers:  · Patient completed Sit <> Stand Transfer with moderate assistance and of 2 persons  with  rolling walker   · Functional Mobility: pt unable to take steps 2/2 pain    Activities of Daily Living:  · Lower Body Dressing: total assistance to don socks    Cognitive/Visual Perceptual:  Cognitive/Psychosocial Skills:     -       Oriented to: Person   -       Follows Commands/attention:Easily distracted and Follows one-step commands  -       Communication: clear/fluent  -       Memory: deficits  -       Safety awareness/insight to disability: impaired   -       Mood/Affect/Coping skills/emotional control: Cooperative    Physical Exam:  Balance:    -       SBA seated balance; Mod A standing balance   Upper Extremity Range of Motion:     -       Right Upper Extremity: WFL  -       Left Upper Extremity: WFL  Upper Extremity Strength:    -       Right Upper Extremity: WFL  -       Left Upper Extremity: WFL   Strength:    -       Right Upper Extremity: fair   -       Left Upper Extremity: fair   Gross motor coordination:   WFL    AMPAC 6 Click ADL:  AMPAC Total Score: 14    Treatment & Education:  Role of OT/POC; WB precautions; use of call bell  Education:    Patient  left HOB elevated with all lines intact, call button in reach, bed alarm on and RN and sitter present    GOALS:   Multidisciplinary Problems     Occupational Therapy Goals        Problem: Occupational Therapy Goal    Goal Priority Disciplines Outcome Interventions   Occupational Therapy Goal     OT, PT/OT     Description: Goals to be met by: discharge     Patient will increase functional independence with ADLs by performing:    LE Dressing with Minimal Assistance and AD as needed.  Grooming while seated with Supervision.  Toileting from bedside commode with Stand-by Assistance for hygiene and clothing management.   Toilet transfer to bedside commode with Stand-by Assistance.                     History:     Past Medical History:   Diagnosis Date    Dementia     GERD (gastroesophageal reflux disease)     Hypertension     Sleep apnea     does not use machine    Wears glasses        Past Surgical History:   Procedure Laterality Date    APPENDECTOMY      CHOLECYSTECTOMY      FINGER SURGERY      laceration    HEMIARTHROPLASTY OF HIP Left 8/16/2020    Procedure: HEMIARTHROPLASTY, HIP;  Surgeon: Rubio Brown MD;  Location: [facility];  Service: Orthopedics;  Laterality: Left;    HERNIA REPAIR         Time Tracking:     OT Date of Treatment: 08/17/20  OT Start Time: 0851  OT Stop Time: 0915  OT Total Time (min): 24 min    Billable Minutes:Evaluation 10  Therapeutic Activity 14    Rashmi Huff OT  8/17/2020

## 2020-08-17 NOTE — PLAN OF CARE
08/17/20 1150   Discharge Assessment   Assessment Type Discharge Planning Assessment   Confirmed/corrected address and phone number on facesheet? Yes   Assessment information obtained from? Other   Communicated expected length of stay with patient/caregiver yes   Prior to hospitilization cognitive status: Alert/Oriented   Prior to hospitalization functional status: Needs Assistance   Current cognitive status: Alert/Oriented   Current Functional Status: Needs Assistance   Lives With child(mike), adult   Able to Return to Prior Arrangements yes   Is patient able to care for self after discharge? Unable to determine at this time (comments)   Patient's perception of discharge disposition home or selfcare   Readmission Within the Last 30 Days no previous admission in last 30 days;lack of support   If yes, most recent facility name: [facility]   Patient currently being followed by outpatient case management? No   Patient currently receives any other outside agency services? No   Equipment Currently Used at Home none   Do you have any problems affording any of your prescribed medications? No   Is the patient taking medications as prescribed? yes   Does the patient have transportation home? Yes   Transportation Anticipated family or friend will provide   Dialysis Name and Scheduled days N/A   Does the patient receive services at the [facility]? No   Discharge Plan A Skilled Nursing Facility   Discharge Plan B Skilled Nursing Facility   DME Needed Upon Discharge  none   Patient/Family in Agreement with Plan yes   Readmission Questionnaire   At the time of your discharge, did someone talk to you about what your health problems were? Yes   At the time of discharge, did someone talk to you about what to watch out for regarding worsening of your health problem? No   At the time of discharge, did someone talk to you about which medication to take when you left the hospital and which ones to stop taking? Yes    At the time of discharge, did someone talk to you about when and where to follow up with a doctor after you left the hospital? Yes   What do you believe caused you to be sick enough to be re-admitted? pneumonia   How often do you need to have someone help you when you read instructions, pamphlets, or other written material from your doctor or pharmacy? Never   Do you have problems taking your medications as prescribed? No   Do you have any problems affording any of  your prescribed medications? No   Do you have problems obtaining/receiving your medications? No   Does the patient have transportation to healthcare appointments? Yes   Living Arrangements house   Does the patient have family/friends to help with healtcare needs after discharge? yes   Talked to Son and the plan is to maybe go to Skilled Nursing. He was in [facility] before. Would like to try places in [place].

## 2020-08-17 NOTE — PROGRESS NOTES
Pharmacist Renal Dose Adjustment Note    Tomasz Chavez is a 79 y.o. male being treated with the medication Lovenox    Patient Data:    Vital Signs (Most Recent):  Temp: 97.5 °F (36.4 °C) (08/17/20 0710)  Pulse: 88 (08/17/20 0710)  Resp: 20 (08/17/20 0710)  BP: 114/75 (08/17/20 0710)  SpO2: (!) 94 % (08/17/20 0710)   Vital Signs (72h Range):  Temp:  [97 °F (36.1 °C)-99.2 °F (37.3 °C)]   Pulse:  [53-90]   Resp:  [14-20]   BP: (106-212)/()   SpO2:  [94 %-100 %]      Recent Labs   Lab 08/15/20  1354 08/16/20  0702 08/17/20  0421   CREATININE 1.2 1.4 1.7*  1.7*     Serum creatinine: 1.7 mg/dL (H) 08/17/20 0421  Estimated creatinine clearance: 33 mL/min (A)    Medication: 30 mg SQ dose: every 24 hours frequency will be changed to medication: 40 mg SQ dose: every 24 hours frequency.    Pharmacist's Name: Eden Aj  Pharmacist's Extension: [phone]

## 2020-08-18 LAB
ALBUMIN SERPL BCP-MCNC: 2.7 G/DL (ref 3.5–5.2)
ALP SERPL-CCNC: 46 U/L (ref 55–135)
ALT SERPL W/O P-5'-P-CCNC: 7 U/L (ref 10–44)
ANION GAP SERPL CALC-SCNC: 9 MMOL/L (ref 8–16)
AST SERPL-CCNC: 31 U/L (ref 10–40)
BASOPHILS # BLD AUTO: 0.02 K/UL (ref 0–0.2)
BASOPHILS NFR BLD: 0.3 % (ref 0–1.9)
BILIRUB SERPL-MCNC: 0.6 MG/DL (ref 0.1–1)
BUN SERPL-MCNC: 37 MG/DL (ref 8–23)
CALCIUM SERPL-MCNC: 8 MG/DL (ref 8.7–10.5)
CHLORIDE SERPL-SCNC: 103 MMOL/L (ref 95–110)
CO2 SERPL-SCNC: 26 MMOL/L (ref 23–29)
CREAT SERPL-MCNC: 1.7 MG/DL (ref 0.5–1.4)
DIFFERENTIAL METHOD: ABNORMAL
EOSINOPHIL # BLD AUTO: 0.3 K/UL (ref 0–0.5)
EOSINOPHIL NFR BLD: 3.9 % (ref 0–8)
ERYTHROCYTE [DISTWIDTH] IN BLOOD BY AUTOMATED COUNT: 14.3 % (ref 11.5–14.5)
EST. GFR  (AFRICAN AMERICAN): 42.8 ML/MIN/1.73 M^2
EST. GFR  (NON AFRICAN AMERICAN): 37 ML/MIN/1.73 M^2
GLUCOSE SERPL-MCNC: 98 MG/DL (ref 70–110)
HCT VFR BLD AUTO: 26.7 % (ref 40–54)
HGB BLD-MCNC: 8.6 G/DL (ref 14–18)
IMM GRANULOCYTES # BLD AUTO: 0.02 K/UL (ref 0–0.04)
IMM GRANULOCYTES NFR BLD AUTO: 0.3 % (ref 0–0.5)
INR PPP: 1.3
LYMPHOCYTES # BLD AUTO: 1 K/UL (ref 1–4.8)
LYMPHOCYTES NFR BLD: 14.2 % (ref 18–48)
MAGNESIUM SERPL-MCNC: 2 MG/DL (ref 1.6–2.6)
MCH RBC QN AUTO: 33.9 PG (ref 27–31)
MCHC RBC AUTO-ENTMCNC: 32.2 G/DL (ref 32–36)
MCV RBC AUTO: 105 FL (ref 82–98)
MONOCYTES # BLD AUTO: 0.3 K/UL (ref 0.3–1)
MONOCYTES NFR BLD: 4.3 % (ref 4–15)
NEUTROPHILS # BLD AUTO: 5.6 K/UL (ref 1.8–7.7)
NEUTROPHILS NFR BLD: 77 % (ref 38–73)
NRBC BLD-RTO: 0 /100 WBC
PLATELET # BLD AUTO: 157 K/UL (ref 150–350)
PMV BLD AUTO: 11.4 FL (ref 9.2–12.9)
POTASSIUM SERPL-SCNC: 3.7 MMOL/L (ref 3.5–5.1)
PROT SERPL-MCNC: 5.5 G/DL (ref 6–8.4)
PROTHROMBIN TIME: 15.5 SEC (ref 10.6–14.8)
RBC # BLD AUTO: 2.54 M/UL (ref 4.6–6.2)
SODIUM SERPL-SCNC: 138 MMOL/L (ref 136–145)
WBC # BLD AUTO: 7.26 K/UL (ref 3.9–12.7)

## 2020-08-18 PROCEDURE — 85025 COMPLETE CBC W/AUTO DIFF WBC: CPT

## 2020-08-18 PROCEDURE — 25000003 PHARM REV CODE 250: Performed by: INTERNAL MEDICINE

## 2020-08-18 PROCEDURE — 12000002 HC ACUTE/MED SURGE SEMI-PRIVATE ROOM

## 2020-08-18 PROCEDURE — 97530 THERAPEUTIC ACTIVITIES: CPT | Mod: CQ

## 2020-08-18 PROCEDURE — 63600175 PHARM REV CODE 636 W HCPCS: Performed by: INTERNAL MEDICINE

## 2020-08-18 PROCEDURE — 97535 SELF CARE MNGMENT TRAINING: CPT

## 2020-08-18 PROCEDURE — 83735 ASSAY OF MAGNESIUM: CPT

## 2020-08-18 PROCEDURE — 80053 COMPREHEN METABOLIC PANEL: CPT

## 2020-08-18 PROCEDURE — 36415 COLL VENOUS BLD VENIPUNCTURE: CPT

## 2020-08-18 PROCEDURE — 85610 PROTHROMBIN TIME: CPT

## 2020-08-18 PROCEDURE — 97116 GAIT TRAINING THERAPY: CPT | Mod: CQ

## 2020-08-18 RX ORDER — HALOPERIDOL 5 MG/ML
5 INJECTION INTRAMUSCULAR ONCE
Status: COMPLETED | OUTPATIENT
Start: 2020-08-18 | End: 2020-08-18

## 2020-08-18 RX ORDER — HALOPERIDOL 5 MG/ML
5 INJECTION INTRAMUSCULAR EVERY 6 HOURS PRN
Status: DISCONTINUED | OUTPATIENT
Start: 2020-08-18 | End: 2020-08-20

## 2020-08-18 RX ORDER — LORAZEPAM 2 MG/ML
1 INJECTION INTRAMUSCULAR ONCE
Status: COMPLETED | OUTPATIENT
Start: 2020-08-18 | End: 2020-08-18

## 2020-08-18 RX ADMIN — HALOPERIDOL LACTATE 5 MG: 5 INJECTION, SOLUTION INTRAMUSCULAR at 03:08

## 2020-08-18 RX ADMIN — LORAZEPAM 1 MG: 2 INJECTION INTRAMUSCULAR; INTRAVENOUS at 06:08

## 2020-08-18 RX ADMIN — ISOSORBIDE MONONITRATE 60 MG: 60 TABLET, EXTENDED RELEASE ORAL at 09:08

## 2020-08-18 RX ADMIN — HALOPERIDOL LACTATE 5 MG: 5 INJECTION, SOLUTION INTRAMUSCULAR at 05:08

## 2020-08-18 RX ADMIN — HYDRALAZINE HYDROCHLORIDE 25 MG: 25 TABLET, FILM COATED ORAL at 05:08

## 2020-08-18 RX ADMIN — HYDRALAZINE HYDROCHLORIDE 25 MG: 25 TABLET, FILM COATED ORAL at 02:08

## 2020-08-18 NOTE — PROGRESS NOTES
[facility]  Progress Note    Patient Name: Tomasz Chavez  MRN: [id number]  Patient Class: IP- Inpatient   Admission Date: 8/15/2020  Length of Stay: 2 days  Attending Physician: Casey Tsai MD  Primary Care Provider: Kendrick Hatfield MD        Subjective:     Principal Problem:Closed displaced fracture of left femoral neck        HPI:  79 year old patient admitted with  Acute subcapital fracture of left femoral neck, with mild impaction  Patient was walking around today and had a fall  Later he felt severe pain on L hip area and was unable to move LLE  Patient described pain as severe/constant/with no radiation  Also he was unable to support his weight       Overview/Hospital Course:  08/16  Pt today had Left hip hemiarthroplasty  No other issues  BP well controlled    08/17  Pt had confused episodes yesterday night  Sitter near bedside    Interval History:     Review of Systems   Unable to perform ROS: Dementia     Objective:     Vital Signs (Most Recent):  Temp: 97.6 °F (36.4 °C) (08/17/20 1554)  Pulse: 98 (08/17/20 1554)  Resp: 20 (08/17/20 1735)  BP: 116/71 (08/17/20 1554)  SpO2: (!) 77 % (08/17/20 1554) Vital Signs (24h Range):  Temp:  [97 °F (36.1 °C)-97.6 °F (36.4 °C)] 97.6 °F (36.4 °C)  Pulse:  [86-98] 98  Resp:  [16-20] 20  SpO2:  [77 %-96 %] 77 %  BP: (114-175)/() 116/71     Weight: 74.8 kg (165 lb)  Body mass index is 25.09 kg/m².    Intake/Output Summary (Last 24 hours) at 8/17/2020 1936  Last data filed at 8/17/2020 1900  Gross per 24 hour   Intake 870 ml   Output 400 ml   Net 470 ml      Physical Exam  Vitals signs and nursing note reviewed.   Constitutional:       Appearance: He is well-developed.   HENT:      Head: Atraumatic.      Nose: Nose normal.      Mouth/Throat:      Mouth: Mucous membranes are moist.   Eyes:      Conjunctiva/sclera: Conjunctivae normal.   Neck:      Musculoskeletal: Full passive range of motion without pain and normal range of motion.    Cardiovascular:      Rate and Rhythm: Normal rate.   Pulmonary:      Effort: Pulmonary effort is normal.   Abdominal:      General: There is no distension.   Musculoskeletal:         General: No swelling.   Skin:     General: Skin is warm.   Neurological:      Mental Status: He is alert and oriented to person, place, and time.   Psychiatric:         Behavior: Behavior normal.      Comments: Confused but alert and pleasant         Significant Labs:   CBC:   Recent Labs   Lab 08/16/20  0702 08/17/20  0421   WBC 5.86 8.10  8.10   HGB 10.5* 9.0*  9.0*   HCT 32.1* 27.6*  27.6*    171  171     CMP:   Recent Labs   Lab 08/16/20  0702 08/17/20  0421    137  137   K 4.0 3.7  3.7    101  101   CO2 29 26  26    122*  122*   BUN 27* 35*  35*   CREATININE 1.4 1.7*  1.7*   CALCIUM 8.6* 8.3*  8.3*   PROT 6.0 5.4*   ALBUMIN 3.3* 2.8*   BILITOT 1.3* 0.6   ALKPHOS 53* 47*   AST 19 39   ALT 18 14   ANIONGAP 8 10  10   EGFRNONAA 46.8* 37.0*  37.0*             Assessment/Plan:      * Closed displaced fracture of left femoral neck  S/p  Left hip hemiarthroplasty POD#1  PT/OT recommended SNF  SW Consulted    Anticoagulated  No idea why pt is on Eliquis at home      BRITT (acute kidney injury)  BRITT per today's labs  D/C all Nephrotoxins(HCTZ/ACE Inh/Lasix)      HTN (hypertension)  Continue present Tx  Patient need prescription for hydralazine when he goes home  Seems Hydralazine is the only med which finally brought his BP down    DNR (do not resuscitate)  DNR      Dementia  Stable issue      CKD (chronic kidney disease), stage III  Chronic stable issue        VTE Risk Mitigation (From admission, onward)         Ordered     enoxaparin injection 40 mg  Every 24 hours (non-standard times)      08/17/20 0858     IP VTE HIGH RISK PATIENT  Once      08/16/20 2128     Place ADONIS hose  Until discontinued      08/16/20 2128     Place sequential compression device  Until discontinued      08/16/20 2128                 Discharge Planning   RACHEL:      Code Status: DNR   Is the patient medically ready for discharge?:     Reason for patient still in hospital (select all that apply): Treatment  Discharge Plan A: Skilled Nursing Facility                  Casey Tsia MD  Department of Hospital Medicine   [facility] hospital

## 2020-08-18 NOTE — PT/OT/SLP PROGRESS
Occupational Therapy   Treatment    Name: Tomasz Chavez  MRN: [id number]  Admitting Diagnosis:  Closed displaced fracture of left femoral neck  2 Days Post-Op    Recommendations:     Discharge Recommendations: nursing facility, skilled  Discharge Equipment Recommendations:  (TBD next level of care)  Barriers to discharge:  Decreased caregiver support    Assessment:     Tomasz Chavez is a 79 y.o. male with a medical diagnosis of Closed displaced fracture of left femoral neck.  Pt agreeable to OT therapy session this AM. Performance deficits affecting function are weakness, impaired endurance, impaired self care skills, impaired functional mobilty, gait instability, impaired balance, impaired cognition, decreased lower extremity function, decreased safety awareness, pain, decreased ROM, orthopedic precautions. Pt limited today 2/2 pain and was only able to sit EOB a few minutes before needing to return to supine.    Rehab Prognosis:  Good; patient would benefit from acute skilled OT services to address these deficits and reach maximum level of function.       Plan:     Patient to be seen 6 x/week to address the above listed problems via self-care/home management, therapeutic activities, therapeutic exercises  · Plan of Care Expires: 08/18/20  · Plan of Care Reviewed with: patient    Subjective     Pain/Comfort:  · Pain Rating 1: (not rated)  · Location - Side 1: Left  · Location 1: hip  · Pain Addressed 1: Reposition, Cessation of Activity    Objective:     Communicated with: nursing/PTA prior to session.  Patient found HOB elevated with peripheral IV, bed alarm upon OT entry to room.    General Precautions: Standard, fall   Orthopedic Precautions:LLE weight bearing as tolerated   Braces: N/A     Occupational Performance:     Bed Mobility:    · Patient completed Supine to Sit with maximal assistance and 2 persons  · Patient completed Sit to Supine with minimum assistance for LE's  · Pt sat EOB ~3 mins with  SBA      Activities of Daily Living:  · Grooming: stand by assistance seated EOB to brush teeth; Pt required cues for intiation and for correct cup to spit and rinse with    Treatment & Education:  Pt educated on WB precautions, use of call bell, and safety during ADLs and transfers.    Patient left HOB elevated with all lines intact, call button in reach and bed alarm onEducation:      GOALS:   Multidisciplinary Problems     Occupational Therapy Goals        Problem: Occupational Therapy Goal    Goal Priority Disciplines Outcome Interventions   Occupational Therapy Goal     OT, PT/OT Ongoing, Progressing    Description: Goals to be met by: discharge     Patient will increase functional independence with ADLs by performing:    LE Dressing with Minimal Assistance and AD as needed.  Grooming while seated with Supervision.  Toileting from bedside commode with Stand-by Assistance for hygiene and clothing management.   Toilet transfer to bedside commode with Stand-by Assistance.                     Time Tracking:     OT Date of Treatment: 08/18/20  OT Start Time: 1017  OT Stop Time: 1032  OT Total Time (min): 15 min    Billable Minutes:Self Care/Home Management 15    Rashmi Huff OT  8/18/2020

## 2020-08-18 NOTE — PROGRESS NOTES
Pt has developed encephalopathy which his son states occurs with any change in environment  A)  Dementia early delirium wit agitation and a danger to medical equipment  Pt's son agrees with treatment  P)  Haldol 5 mg IM Q 6 prn agitation  Ativan 2 mg IM X 1

## 2020-08-18 NOTE — PLAN OF CARE
Problem: Fall Injury Risk  Goal: Absence of Fall and Fall-Related Injury  Outcome: Ongoing, Progressing     Problem: Adult Inpatient Plan of Care  Goal: Patient-Specific Goal (Individualization)  Outcome: Ongoing, Progressing     Problem: Infection  Goal: Infection Symptom Resolution  Outcome: Ongoing, Progressing     Problem: Skin Injury Risk Increased  Goal: Skin Health and Integrity  Outcome: Ongoing, Progressing

## 2020-08-18 NOTE — SUBJECTIVE & OBJECTIVE
Interval History: Pt denies any pain. He is progressing with PT    Review of Systems   Unable to perform ROS: Dementia     Objective:     Vital Signs (Most Recent):  Temp: 98.1 °F (36.7 °C) (08/18/20 1209)  Pulse: 94 (08/18/20 1209)  Resp: 18 (08/18/20 1209)  BP: (!) 145/78 (08/18/20 1209)  SpO2: 95 % (08/18/20 1209) Vital Signs (24h Range):  Temp:  [97.6 °F (36.4 °C)-98.6 °F (37 °C)] 98.1 °F (36.7 °C)  Pulse:  [] 94  Resp:  [16-20] 18  SpO2:  [77 %-97 %] 95 %  BP: (116-173)/(71-91) 145/78     Weight: 74.8 kg (165 lb)  Body mass index is 25.09 kg/m².    Intake/Output Summary (Last 24 hours) at 8/18/2020 1242  Last data filed at 8/18/2020 0500  Gross per 24 hour   Intake 830 ml   Output --   Net 830 ml      Physical Exam  Vitals signs and nursing note reviewed.   Constitutional:       General: He is not in acute distress.     Appearance: Normal appearance.   HENT:      Head: Normocephalic and atraumatic.      Nose: Nose normal.      Mouth/Throat:      Mouth: Mucous membranes are moist.   Eyes:      Extraocular Movements: Extraocular movements intact.      Pupils: Pupils are equal, round, and reactive to light.   Neck:      Musculoskeletal: Normal range of motion.   Cardiovascular:      Rate and Rhythm: Normal rate and regular rhythm.   Pulmonary:      Effort: Pulmonary effort is normal.      Breath sounds: Normal breath sounds.   Abdominal:      General: Bowel sounds are normal. There is no distension.      Tenderness: There is no abdominal tenderness.   Musculoskeletal: Normal range of motion.   Skin:     General: Skin is warm.   Neurological:      General: No focal deficit present.      Mental Status: He is alert.   Psychiatric:      Comments: Pt could not cooperate with the exam         Significant Labs:   Recent Lab Results       08/18/20  0526   08/18/20  0525        Albumin   2.7     Alkaline Phosphatase   46     ALT   7     Anion Gap   9     AST   31     Baso #   0.02     Basophil%   0.3     BILIRUBIN  TOTAL   0.6  Comment:  For infants and newborns, interpretation of results should be based  on gestational age, weight and in agreement with clinical  observations.  Premature Infant recommended reference ranges:  Up to 24 hours.............<8.0 mg/dL  Up to 48 hours............<12.0 mg/dL  3-5 days..................<15.0 mg/dL  6-29 days.................<15.0 mg/dL       BUN, Bld   37     Calcium   8.0     Chloride   103     CO2   26     Creatinine   1.7     Differential Method   Automated     eGFR if African American   42.8     eGFR if non    37.0  Comment:  Calculation used to obtain the estimated glomerular filtration  rate (eGFR) is the CKD-EPI equation.        Eos #   0.3     Eosinophil%   3.9     Glucose   98     Gran # (ANC)   5.6     Gran%   77.0     Hematocrit   26.7     Hemoglobin   8.6     Immature Grans (Abs)   0.02  Comment:  Mild elevation in immature granulocytes is non specific and   can be seen in a variety of conditions including stress response,   acute inflammation, trauma and pregnancy. Correlation with other   laboratory and clinical findings is essential.       Immature Granulocytes   0.3     INR 1.3  Comment:  Coumadin Therapy:  INR: 2.0-3.0 conventional anticoagulation  INR: 2.5-3.5 intensive anticoagulation         Lymph #   1.0     Lymph%   14.2     Magnesium   2.0     MCH   33.9     MCHC   32.2     MCV   105     Mono #   0.3     Mono%   4.3     MPV   11.4     nRBC   0     Platelets   157     Potassium   3.7     PROTEIN TOTAL   5.5     PT 15.5       RBC   2.54     RDW   14.3     Sodium   138     WBC   7.26           Significant Imaging: I have reviewed all pertinent imaging results/findings within the past 24 hours.

## 2020-08-18 NOTE — PT/OT/SLP PROGRESS
Physical Therapy Treatment    Patient Name:  Tomasz Chavez   MRN:  [id number]    Recommendations:     Discharge Recommendations:  nursing facility, skilled   Discharge Equipment Recommendations: walker, rolling   Barriers to discharge: Decreased caregiver support    Assessment:     Tomasz Chavez is a 79 y.o. male admitted with a medical diagnosis of Closed displaced fracture of left femoral neck.  He presents with the following impairments/functional limitations:  weakness, impaired endurance, impaired self care skills, impaired functional mobilty, gait instability, impaired balance, decreased lower extremity function, decreased safety awareness, pain, decreased ROM. Pt requires increased time throughout tx 2/2 frequent rest breaks due to L hip pain. Pt with onset of posterior lean once standing requiring mod A to maintain upright position. Pt completed 4 side steps noting Pt required verbal and tactile cuing for walker management. Pt declined further ambulation 2/2 L hip pain. Pt with attempts to return self to supine position.  Continue with PT and POC.     Rehab Prognosis: Good; patient would benefit from acute skilled PT services to address these deficits and reach maximum level of function.    Recent Surgery: Procedure(s) (LRB):  HEMIARTHROPLASTY, HIP (Left) 2 Days Post-Op    Plan:     During this hospitalization, patient to be seen daily to address the identified rehab impairments via gait training, therapeutic activities, therapeutic exercises and progress toward the following goals:    · Plan of Care Expires:  09/15/20    Subjective     Chief Complaint: Pt reports L hip pain throughout tx  Patient/Family Comments/goals: return home   Pain/Comfort:  · Pain Rating 1: (not rated)  · Location - Side 1: Left  · Location 1: hip  · Pain Addressed 1: Pre-medicate for activity, Reposition, Distraction      Objective:     Communicated with RN prior to session.  Patient found HOB elevated with peripheral IV, bed  alarm upon PT entry to room.     General Precautions: Standard, fall   Orthopedic Precautions:LLE weight bearing as tolerated   Braces:       Functional Mobility:  · Bed Mobility:     · Scooting: maximal assistance  · Bridging: moderate assistance  · Supine to Sit: moderate assistance  · Sit to Supine: moderate assistance  · Transfers:     · Sit to Stand:  moderate assistance with rolling walker  · Gait: 4 side steps with RW and min A requiring verbal cuing for walker management       AM-PAC 6 CLICK MOBILITY          Therapeutic Activities and Exercises:   bed mobility; sitting EOB for trunk control and midline orientation; sit<> stands; transfer training; gait training     Patient left HOB elevated with all lines intact, call button in reach and bed alarm on..    GOALS:   Multidisciplinary Problems     Physical Therapy Goals        Problem: Physical Therapy Goal    Goal Priority Disciplines Outcome Goal Variances Interventions   Physical Therapy Goal     PT, PT/OT Ongoing, Progressing     Description: Goals to be met by: 9/15/20     Patient will increase functional independence with mobility by performin. Supine to sit with Stand-by Assistance  2. Sit to stand transfer with Minimal Assistance  3. Gait  x 150 feet with Minimal Assistance using Rolling Walker.   4. Lower extremity exercise program x20-30 reps per handout, with supervision                     Time Tracking:     PT Received On: 20  PT Start Time: 902     PT Stop Time: 933  PT Total Time (min): 31 min     Billable Minutes: Gait Training 10 and Therapeutic Activity 21    Treatment Type: Treatment  PT/PTA: PTA     PTA Visit Number: 1     Hillary Hammant, PTA  2020

## 2020-08-18 NOTE — SUBJECTIVE & OBJECTIVE
Interval History:     Review of Systems   Unable to perform ROS: Dementia     Objective:     Vital Signs (Most Recent):  Temp: 97.6 °F (36.4 °C) (08/17/20 1554)  Pulse: 98 (08/17/20 1554)  Resp: 20 (08/17/20 1735)  BP: 116/71 (08/17/20 1554)  SpO2: (!) 77 % (08/17/20 1554) Vital Signs (24h Range):  Temp:  [97 °F (36.1 °C)-97.6 °F (36.4 °C)] 97.6 °F (36.4 °C)  Pulse:  [86-98] 98  Resp:  [16-20] 20  SpO2:  [77 %-96 %] 77 %  BP: (114-175)/() 116/71     Weight: 74.8 kg (165 lb)  Body mass index is 25.09 kg/m².    Intake/Output Summary (Last 24 hours) at 8/17/2020 1936  Last data filed at 8/17/2020 1900  Gross per 24 hour   Intake 870 ml   Output 400 ml   Net 470 ml      Physical Exam  Vitals signs and nursing note reviewed.   Constitutional:       Appearance: He is well-developed.   HENT:      Head: Atraumatic.      Nose: Nose normal.      Mouth/Throat:      Mouth: Mucous membranes are moist.   Eyes:      Conjunctiva/sclera: Conjunctivae normal.   Neck:      Musculoskeletal: Full passive range of motion without pain and normal range of motion.   Cardiovascular:      Rate and Rhythm: Normal rate.   Pulmonary:      Effort: Pulmonary effort is normal.   Abdominal:      General: There is no distension.   Musculoskeletal:         General: No swelling.   Skin:     General: Skin is warm.   Neurological:      Mental Status: He is alert and oriented to person, place, and time.   Psychiatric:         Behavior: Behavior normal.      Comments: Confused but alert and pleasant         Significant Labs:   CBC:   Recent Labs   Lab 08/16/20  0702 08/17/20  0421   WBC 5.86 8.10  8.10   HGB 10.5* 9.0*  9.0*   HCT 32.1* 27.6*  27.6*    171  171     CMP:   Recent Labs   Lab 08/16/20  0702 08/17/20  0421    137  137   K 4.0 3.7  3.7    101  101   CO2 29 26  26    122*  122*   BUN 27* 35*  35*   CREATININE 1.4 1.7*  1.7*   CALCIUM 8.6* 8.3*  8.3*   PROT 6.0 5.4*   ALBUMIN 3.3* 2.8*   BILITOT 1.3*  0.6   ALKPHOS 53* 47*   AST 19 39   ALT 18 14   ANIONGAP 8 10  10   EGFRNONAA 46.8* 37.0*  37.0*

## 2020-08-18 NOTE — PLAN OF CARE
Problem: Physical Therapy Goal  Goal: Physical Therapy Goal  Description: Goals to be met by: 9/15/20     Patient will increase functional independence with mobility by performin. Supine to sit with Stand-by Assistance  2. Sit to stand transfer with Minimal Assistance  3. Gait  x 150 feet with Minimal Assistance using Rolling Walker.   4. Lower extremity exercise program x20-30 reps per handout, with supervision    Outcome: Ongoing, Progressing   Pt progressing toward meeting goals

## 2020-08-18 NOTE — PROGRESS NOTES
[facility] Medicine  Progress Note    Patient Name: Tomasz Chavez  MRN: [id number]  Patient Class: IP- Inpatient   Admission Date: 8/15/2020  Length of Stay: 3 days  Attending Physician: Otilio Amador MD  Primary Care Provider: Kendrick Hatfield MD        Subjective:     Principal Problem:Closed displaced fracture of left femoral neck        HPI:  79 year old patient admitted with  Acute subcapital fracture of left femoral neck, with mild impaction  Patient was walking around today and had a fall  Later he felt severe pain on L hip area and was unable to move LLE  Patient described pain as severe/constant/with no radiation  Also he was unable to support his weight       Overview/Hospital Course:  08/16  Pt today had Left hip hemiarthroplasty  No other issues  BP well controlled    08/17  Pt had confused episodes yesterday night  Sitter near bedside    8/18/2020  Pt denies any pain and is progressing with PT. Interaction limited by dementia.    Interval History: Pt denies any pain. He is progressing with PT    Review of Systems   Unable to perform ROS: Dementia     Objective:     Vital Signs (Most Recent):  Temp: 98.1 °F (36.7 °C) (08/18/20 1209)  Pulse: 94 (08/18/20 1209)  Resp: 18 (08/18/20 1209)  BP: (!) 145/78 (08/18/20 1209)  SpO2: 95 % (08/18/20 1209) Vital Signs (24h Range):  Temp:  [97.6 °F (36.4 °C)-98.6 °F (37 °C)] 98.1 °F (36.7 °C)  Pulse:  [] 94  Resp:  [16-20] 18  SpO2:  [77 %-97 %] 95 %  BP: (116-173)/(71-91) 145/78     Weight: 74.8 kg (165 lb)  Body mass index is 25.09 kg/m².    Intake/Output Summary (Last 24 hours) at 8/18/2020 1242  Last data filed at 8/18/2020 0500  Gross per 24 hour   Intake 830 ml   Output --   Net 830 ml      Physical Exam  Vitals signs and nursing note reviewed.   Constitutional:       General: He is not in acute distress.     Appearance: Normal appearance.   HENT:      Head: Normocephalic and atraumatic.      Nose: Nose normal.       Mouth/Throat:      Mouth: Mucous membranes are moist.   Eyes:      Extraocular Movements: Extraocular movements intact.      Pupils: Pupils are equal, round, and reactive to light.   Neck:      Musculoskeletal: Normal range of motion.   Cardiovascular:      Rate and Rhythm: Normal rate and regular rhythm.   Pulmonary:      Effort: Pulmonary effort is normal.      Breath sounds: Normal breath sounds.   Abdominal:      General: Bowel sounds are normal. There is no distension.      Tenderness: There is no abdominal tenderness.   Musculoskeletal: Normal range of motion.   Skin:     General: Skin is warm.   Neurological:      General: No focal deficit present.      Mental Status: He is alert.   Psychiatric:      Comments: Pt could not cooperate with the exam         Significant Labs:   Recent Lab Results       08/18/20  0526   08/18/20  0525        Albumin   2.7     Alkaline Phosphatase   46     ALT   7     Anion Gap   9     AST   31     Baso #   0.02     Basophil%   0.3     BILIRUBIN TOTAL   0.6  Comment:  For infants and newborns, interpretation of results should be based  on gestational age, weight and in agreement with clinical  observations.  Premature Infant recommended reference ranges:  Up to 24 hours.............<8.0 mg/dL  Up to 48 hours............<12.0 mg/dL  3-5 days..................<15.0 mg/dL  6-29 days.................<15.0 mg/dL       BUN, Bld   37     Calcium   8.0     Chloride   103     CO2   26     Creatinine   1.7     Differential Method   Automated     eGFR if African American   42.8     eGFR if non    37.0  Comment:  Calculation used to obtain the estimated glomerular filtration  rate (eGFR) is the CKD-EPI equation.        Eos #   0.3     Eosinophil%   3.9     Glucose   98     Gran # (ANC)   5.6     Gran%   77.0     Hematocrit   26.7     Hemoglobin   8.6     Immature Grans (Abs)   0.02  Comment:  Mild elevation in immature granulocytes is non specific and   can be seen in a variety  of conditions including stress response,   acute inflammation, trauma and pregnancy. Correlation with other   laboratory and clinical findings is essential.       Immature Granulocytes   0.3     INR 1.3  Comment:  Coumadin Therapy:  INR: 2.0-3.0 conventional anticoagulation  INR: 2.5-3.5 intensive anticoagulation         Lymph #   1.0     Lymph%   14.2     Magnesium   2.0     MCH   33.9     MCHC   32.2     MCV   105     Mono #   0.3     Mono%   4.3     MPV   11.4     nRBC   0     Platelets   157     Potassium   3.7     PROTEIN TOTAL   5.5     PT 15.5       RBC   2.54     RDW   14.3     Sodium   138     WBC   7.26           Significant Imaging: I have reviewed all pertinent imaging results/findings within the past 24 hours.      Assessment/Plan:   8/18/2020  Pt denies any pain  He is progressing with PT  A)  Left hip Fx-S/P repair and progressing with PT  P)  SNF placement       * Closed displaced fracture of left femoral neck  S/p  Left hip hemiarthroplasty POD#1  PT/OT recommended SNF  SW Consulted    Anticoagulated  No idea why pt is on Eliquis at home      BRITT (acute kidney injury)  BRITT per today's labs  D/C all Nephrotoxins(HCTZ/ACE Inh/Lasix)      HTN (hypertension)  Continue present Tx  Patient need prescription for hydralazine when he goes home  Seems Hydralazine is the only med which finally brought his BP down    DNR (do not resuscitate)  DNR      Dementia  Stable issue      CKD (chronic kidney disease), stage III  Chronic stable issue        VTE Risk Mitigation (From admission, onward)         Ordered     enoxaparin injection 40 mg  Every 24 hours (non-standard times)      08/17/20 0858     IP VTE HIGH RISK PATIENT  Once      08/16/20 2128     Place ADONIS hose  Until discontinued      08/16/20 2128     Place sequential compression device  Until discontinued      08/16/20 2128                Discharge Planning   RACHEL:      Code Status: DNR   Is the patient medically ready for discharge?:     Reason for patient  still in hospital (select all that apply): PT / OT recommendations  Discharge Plan A: Skilled Nursing Facility                  Otilio Amador MD  Department of Hospital Medicine   [facility]

## 2020-08-18 NOTE — PROGRESS NOTES
Post Op    Pt awake in bed. Appears comfortable.    Left hip - dressing CDI.    Plan - Continue care and PT. Stable from ortho standpoint. Needs DC planning. Can FU my office in 2 weeks.

## 2020-08-18 NOTE — ASSESSMENT & PLAN NOTE
Continue present Tx  Patient need prescription for hydralazine when he goes home  Seems Hydralazine is the only med which finally brought his BP down

## 2020-08-19 LAB
ALBUMIN SERPL BCP-MCNC: 2.6 G/DL (ref 3.5–5.2)
ALP SERPL-CCNC: 48 U/L (ref 55–135)
ALT SERPL W/O P-5'-P-CCNC: 8 U/L (ref 10–44)
ANION GAP SERPL CALC-SCNC: 9 MMOL/L (ref 8–16)
AST SERPL-CCNC: 33 U/L (ref 10–40)
BASOPHILS # BLD AUTO: 0.01 K/UL (ref 0–0.2)
BASOPHILS NFR BLD: 0.2 % (ref 0–1.9)
BILIRUB SERPL-MCNC: 0.6 MG/DL (ref 0.1–1)
BUN SERPL-MCNC: 34 MG/DL (ref 8–23)
CALCIUM SERPL-MCNC: 8.1 MG/DL (ref 8.7–10.5)
CHLORIDE SERPL-SCNC: 105 MMOL/L (ref 95–110)
CO2 SERPL-SCNC: 26 MMOL/L (ref 23–29)
CREAT SERPL-MCNC: 1.5 MG/DL (ref 0.5–1.4)
DIFFERENTIAL METHOD: ABNORMAL
EOSINOPHIL # BLD AUTO: 0.2 K/UL (ref 0–0.5)
EOSINOPHIL NFR BLD: 2.8 % (ref 0–8)
ERYTHROCYTE [DISTWIDTH] IN BLOOD BY AUTOMATED COUNT: 14.2 % (ref 11.5–14.5)
EST. GFR  (AFRICAN AMERICAN): 49.8 ML/MIN/1.73 M^2
EST. GFR  (NON AFRICAN AMERICAN): 43 ML/MIN/1.73 M^2
GLUCOSE SERPL-MCNC: 105 MG/DL (ref 70–110)
HCT VFR BLD AUTO: 24.9 % (ref 40–54)
HGB BLD-MCNC: 8.1 G/DL (ref 14–18)
IMM GRANULOCYTES # BLD AUTO: 0.02 K/UL (ref 0–0.04)
IMM GRANULOCYTES NFR BLD AUTO: 0.4 % (ref 0–0.5)
INR PPP: 1.3
LYMPHOCYTES # BLD AUTO: 1 K/UL (ref 1–4.8)
LYMPHOCYTES NFR BLD: 17.6 % (ref 18–48)
MAGNESIUM SERPL-MCNC: 1.9 MG/DL (ref 1.6–2.6)
MCH RBC QN AUTO: 33.2 PG (ref 27–31)
MCHC RBC AUTO-ENTMCNC: 32.5 G/DL (ref 32–36)
MCV RBC AUTO: 102 FL (ref 82–98)
MONOCYTES # BLD AUTO: 0.2 K/UL (ref 0.3–1)
MONOCYTES NFR BLD: 4.4 % (ref 4–15)
NEUTROPHILS # BLD AUTO: 4.1 K/UL (ref 1.8–7.7)
NEUTROPHILS NFR BLD: 74.6 % (ref 38–73)
NRBC BLD-RTO: 0 /100 WBC
PLATELET # BLD AUTO: 163 K/UL (ref 150–350)
PMV BLD AUTO: 11 FL (ref 9.2–12.9)
POTASSIUM SERPL-SCNC: 3.5 MMOL/L (ref 3.5–5.1)
PROT SERPL-MCNC: 5.6 G/DL (ref 6–8.4)
PROTHROMBIN TIME: 15.6 SEC (ref 10.6–14.8)
RBC # BLD AUTO: 2.44 M/UL (ref 4.6–6.2)
SARS-COV-2 RNA RESP QL NAA+PROBE: NOT DETECTED
SODIUM SERPL-SCNC: 140 MMOL/L (ref 136–145)
WBC # BLD AUTO: 5.45 K/UL (ref 3.9–12.7)

## 2020-08-19 PROCEDURE — 80053 COMPREHEN METABOLIC PANEL: CPT

## 2020-08-19 PROCEDURE — 63600175 PHARM REV CODE 636 W HCPCS: Performed by: INTERNAL MEDICINE

## 2020-08-19 PROCEDURE — 85025 COMPLETE CBC W/AUTO DIFF WBC: CPT

## 2020-08-19 PROCEDURE — 25000003 PHARM REV CODE 250: Performed by: ORTHOPAEDIC SURGERY

## 2020-08-19 PROCEDURE — 85610 PROTHROMBIN TIME: CPT

## 2020-08-19 PROCEDURE — 25000003 PHARM REV CODE 250: Performed by: INTERNAL MEDICINE

## 2020-08-19 PROCEDURE — 36415 COLL VENOUS BLD VENIPUNCTURE: CPT

## 2020-08-19 PROCEDURE — U0003 INFECTIOUS AGENT DETECTION BY NUCLEIC ACID (DNA OR RNA); SEVERE ACUTE RESPIRATORY SYNDROME CORONAVIRUS 2 (SARS-COV-2) (CORONAVIRUS DISEASE [COVID-19]), AMPLIFIED PROBE TECHNIQUE, MAKING USE OF HIGH THROUGHPUT TECHNOLOGIES AS DESCRIBED BY CMS-2020-01-R: HCPCS

## 2020-08-19 PROCEDURE — 12000002 HC ACUTE/MED SURGE SEMI-PRIVATE ROOM

## 2020-08-19 PROCEDURE — 97535 SELF CARE MNGMENT TRAINING: CPT

## 2020-08-19 PROCEDURE — 30200315 PPD INTRADERMAL TEST REV CODE 302: Performed by: INTERNAL MEDICINE

## 2020-08-19 PROCEDURE — 86580 TB INTRADERMAL TEST: CPT | Performed by: INTERNAL MEDICINE

## 2020-08-19 PROCEDURE — 97116 GAIT TRAINING THERAPY: CPT | Mod: CQ

## 2020-08-19 PROCEDURE — 83735 ASSAY OF MAGNESIUM: CPT

## 2020-08-19 RX ADMIN — HYDRALAZINE HYDROCHLORIDE 25 MG: 25 TABLET, FILM COATED ORAL at 09:08

## 2020-08-19 RX ADMIN — HYDROCODONE BITARTRATE AND ACETAMINOPHEN 1 TABLET: 10; 325 TABLET ORAL at 01:08

## 2020-08-19 RX ADMIN — ENOXAPARIN SODIUM 40 MG: 100 INJECTION SUBCUTANEOUS at 05:08

## 2020-08-19 RX ADMIN — HYDRALAZINE HYDROCHLORIDE 25 MG: 25 TABLET, FILM COATED ORAL at 01:08

## 2020-08-19 RX ADMIN — HYDRALAZINE HYDROCHLORIDE 25 MG: 25 TABLET, FILM COATED ORAL at 05:08

## 2020-08-19 RX ADMIN — HALOPERIDOL LACTATE 5 MG: 5 INJECTION, SOLUTION INTRAMUSCULAR at 10:08

## 2020-08-19 RX ADMIN — TUBERCULIN PURIFIED PROTEIN DERIVATIVE 5 UNITS: 5 INJECTION, SOLUTION INTRADERMAL at 11:08

## 2020-08-19 RX ADMIN — ISOSORBIDE MONONITRATE 60 MG: 60 TABLET, EXTENDED RELEASE ORAL at 11:08

## 2020-08-19 RX ADMIN — ATORVASTATIN CALCIUM 20 MG: 20 TABLET, FILM COATED ORAL at 09:08

## 2020-08-19 NOTE — NURSING
Patient very confused, agitated and combative. Pulling out IV's, and Bandages, picking at staples.

## 2020-08-19 NOTE — PROGRESS NOTES
Post op    Pt awake and alert. Not coherent communication.    Left hip - Wound CDI.    Plan - Continue care and mobilize as tolerated. Stable from ortho standpoint. Continue dressing changes as needed to keep patient from picking at wound.

## 2020-08-19 NOTE — PLAN OF CARE
Problem: Fall Injury Risk  Goal: Absence of Fall and Fall-Related Injury  Outcome: Ongoing, Progressing     Problem: Adult Inpatient Plan of Care  Goal: Plan of Care Review  Outcome: Ongoing, Progressing  Goal: Patient-Specific Goal (Individualization)  Outcome: Ongoing, Progressing  Goal: Absence of Hospital-Acquired Illness or Injury  Outcome: Ongoing, Progressing  Goal: Optimal Comfort and Wellbeing  Outcome: Ongoing, Progressing  Goal: Readiness for Transition of Care  Outcome: Ongoing, Progressing  Goal: Rounds/Family Conference  Outcome: Ongoing, Progressing     Problem: Infection  Goal: Infection Symptom Resolution  Outcome: Ongoing, Progressing     Problem: Pain Acute  Goal: Optimal Pain Control  Outcome: Ongoing, Progressing     Problem: Skin Injury Risk Increased  Goal: Skin Health and Integrity  Outcome: Ongoing, Progressing     Problem: Obstructive Sleep Apnea Risk or Actual  Goal: Unobstructed Breathing During Sleep  Outcome: Ongoing, Progressing     Problem: Wound  Goal: Optimal Wound Healing  Outcome: Ongoing, Progressing     Problem: Electrolyte Imbalance (Acute Kidney Injury/Impairment)  Goal: Serum Electrolyte Balance  Outcome: Ongoing, Progressing     Problem: Fluid Imbalance (Acute Kidney Injury/Impairment)  Goal: Optimal Fluid Balance  Outcome: Ongoing, Progressing     Problem: Hematologic Alteration (Acute Kidney Injury/Impairment)  Goal: Hemoglobin, Hematocrit and Platelets Within Normal Range  Outcome: Ongoing, Progressing     Problem: Oral Intake Inadequate (Acute Kidney Injury/Impairment)  Goal: Optimal Nutrition Intake  Outcome: Ongoing, Progressing     Problem: Renal Function Impairment (Acute Kidney Injury/Impairment)  Goal: Effective Renal Function  Outcome: Ongoing, Progressing

## 2020-08-19 NOTE — PLAN OF CARE
Problem: Physical Therapy Goal  Goal: Physical Therapy Goal  Description: Goals to be met by: 9/15/20     Patient will increase functional independence with mobility by performin. Supine to sit with Stand-by Assistance  2. Sit to stand transfer with Minimal Assistance  3. Gait  x 150 feet with Minimal Assistance using Rolling Walker.   4. Lower extremity exercise program x20-30 reps per handout, with supervision    Outcome: Ongoing, Progressing   Pt continues to progress towards goals.

## 2020-08-19 NOTE — PT/OT/SLP PROGRESS
Occupational Therapy   Treatment    Name: Tomasz Chavez  MRN: [id number]  Admitting Diagnosis:  Closed displaced fracture of left femoral neck  3 Days Post-Op    Recommendations:     Discharge Recommendations: nursing facility, skilled  Discharge Equipment Recommendations:  (TBD next level of care)  Barriers to discharge:  Decreased caregiver support    Assessment:     Tomasz Chavez is a 79 y.o. male with a medical diagnosis of Closed displaced fracture of left femoral neck.  Pt agreeable to OT therapy session. Performance deficits affecting function are weakness, impaired endurance, impaired self care skills, impaired functional mobilty, gait instability, impaired balance, impaired cognition, decreased lower extremity function, decreased safety awareness, orthopedic precautions. Pt with improved functional performance this session, with patient reporting no pain during session.    Rehab Prognosis:  Fair; patient would benefit from acute skilled OT services to address these deficits and reach maximum level of function.       Plan:     Patient to be seen 6 x/week to address the above listed problems via self-care/home management, therapeutic activities, therapeutic exercises  · Plan of Care Expires: 08/18/20  · Plan of Care Reviewed with: patient    Subjective     Pain/Comfort:  · Pain Rating 1: 0/10    Objective:     Communicated with: nursing prior to session.  Patient found HOB elevated with bed alarm, telemetry upon OT entry to room.    General Precautions: Standard, fall   Orthopedic Precautions:LLE weight bearing as tolerated   Braces: N/A     Occupational Performance:     Bed Mobility:    · Patient completed Supine to Sit with moderate assistance  · Patient completed Sit to Supine with minimum assistance     Functional Mobility/Transfers:  · Patient completed Sit <> Stand Transfer with minimum assistance  with  rolling walker   · Functional Mobility: pt amb from bed <> door with RW with Min A with max  verbal/tactile cues for RW management and for safety    Activities of Daily Living:  · Grooming: stand by assistance seated EOB for oral care; pt required cues for where to place toothpaste and cues for correct cup to rinse and spit in    Treatment & Education:  Pt educated RW technique and on safety during ADLs, transfers, and functional mobility.    Patient left HOB elevated with all lines intact, call button in reach, bed alarm on and sitter presentEducation:      GOALS:   Multidisciplinary Problems     Occupational Therapy Goals        Problem: Occupational Therapy Goal    Goal Priority Disciplines Outcome Interventions   Occupational Therapy Goal     OT, PT/OT Ongoing, Progressing    Description: Goals to be met by: discharge     Patient will increase functional independence with ADLs by performing:    LE Dressing with Minimal Assistance and AD as needed.  Grooming while seated with Supervision.  Toileting from bedside commode with Stand-by Assistance for hygiene and clothing management.   Toilet transfer to bedside commode with Stand-by Assistance.                     Time Tracking:     OT Date of Treatment: 08/19/20  OT Start Time: 1304  OT Stop Time: 1320  OT Total Time (min): 16 min    Billable Minutes:Self Care/Home Management 16    Rashmi Huff OT  8/19/2020

## 2020-08-19 NOTE — PROGRESS NOTES
[facility]  Progress Note    Patient Name: Tomasz Chavez  MRN: [id number]  Patient Class: IP- Inpatient   Admission Date: 8/15/2020  Length of Stay: 4 days  Attending Physician: Otilio Amador MD  Primary Care Provider: Kendrick Hatfield MD        Subjective:     Principal Problem:Closed displaced fracture of left femoral neck        HPI:  79 year old patient admitted with  Acute subcapital fracture of left femoral neck, with mild impaction  Patient was walking around today and had a fall  Later he felt severe pain on L hip area and was unable to move LLE  Patient described pain as severe/constant/with no radiation  Also he was unable to support his weight       Overview/Hospital Course:  08/16  Pt today had Left hip hemiarthroplasty  No other issues  BP well controlled    08/17  Pt had confused episodes yesterday night  Sitter near bedside    8/18/2020  Pt denies any pain and is progressing with PT. Interaction limited by dementia.    8/19/2020  Pt had PM encephalopathy that has resolved. He states that he has no pain, no problems.    Interval History: Mr Chavez is calm and cooperative with no complaints of pain    Review of Systems   Unable to perform ROS: Dementia     Objective:     Vital Signs (Most Recent):  Temp: 98.1 °F (36.7 °C) (08/19/20 0818)  Pulse: 84 (08/19/20 0818)  Resp: 18 (08/19/20 0818)  BP: (!) 163/80 (08/19/20 0818)  SpO2: 98 % (08/19/20 0818) Vital Signs (24h Range):  Temp:  [98.1 °F (36.7 °C)-99.3 °F (37.4 °C)] 98.1 °F (36.7 °C)  Pulse:  [] 84  Resp:  [18-20] 18  SpO2:  [93 %-98 %] 98 %  BP: (145-166)/(78-85) 163/80     Weight: 74.8 kg (165 lb)  Body mass index is 25.09 kg/m².    Intake/Output Summary (Last 24 hours) at 8/19/2020 1120  Last data filed at 8/19/2020 0700  Gross per 24 hour   Intake 200 ml   Output --   Net 200 ml      Physical Exam  Vitals signs and nursing note reviewed.   Constitutional:       Appearance: Normal appearance.       Comments: 0/10 painful faces   HENT:      Head: Normocephalic and atraumatic.      Nose: Nose normal.      Mouth/Throat:      Mouth: Mucous membranes are moist.   Eyes:      Extraocular Movements: Extraocular movements intact.      Pupils: Pupils are equal, round, and reactive to light.   Neck:      Musculoskeletal: Normal range of motion and neck supple.   Cardiovascular:      Rate and Rhythm: Normal rate and regular rhythm.   Pulmonary:      Effort: Pulmonary effort is normal.      Comments: Diminished bibasilar breath sounds  Abdominal:      General: Bowel sounds are normal. There is no distension.      Tenderness: There is no abdominal tenderness.   Musculoskeletal:      Comments: Poor cooperation with the exam   Skin:     General: Skin is warm.   Neurological:      Mental Status: He is alert.      Comments: Poor cooperation with the exam   Psychiatric:      Comments: Poor cooperation with the exam         Significant Labs:   Recent Lab Results       08/19/20  0429        Albumin 2.6     Alkaline Phosphatase 48     ALT 8     Anion Gap 9     AST 33     Baso # 0.01     Basophil% 0.2     BILIRUBIN TOTAL 0.6  Comment:  For infants and newborns, interpretation of results should be based  on gestational age, weight and in agreement with clinical  observations.  Premature Infant recommended reference ranges:  Up to 24 hours.............<8.0 mg/dL  Up to 48 hours............<12.0 mg/dL  3-5 days..................<15.0 mg/dL  6-29 days.................<15.0 mg/dL       BUN, Bld 34     Calcium 8.1     Chloride 105     CO2 26     Creatinine 1.5     Differential Method Automated     eGFR if African American 49.8     eGFR if non  43.0  Comment:  Calculation used to obtain the estimated glomerular filtration  rate (eGFR) is the CKD-EPI equation.        Eos # 0.2     Eosinophil% 2.8     Glucose 105     Gran # (ANC) 4.1     Gran% 74.6     Hematocrit 24.9     Hemoglobin 8.1     Immature Grans (Abs)  0.02  Comment:  Mild elevation in immature granulocytes is non specific and   can be seen in a variety of conditions including stress response,   acute inflammation, trauma and pregnancy. Correlation with other   laboratory and clinical findings is essential.       Immature Granulocytes 0.4     INR 1.3  Comment:  Coumadin Therapy:  INR: 2.0-3.0 conventional anticoagulation  INR: 2.5-3.5 intensive anticoagulation       Lymph # 1.0     Lymph% 17.6     Magnesium 1.9     MCH 33.2     MCHC 32.5          Mono # 0.2     Mono% 4.4     MPV 11.0     nRBC 0     Platelets 163     Potassium 3.5     PROTEIN TOTAL 5.6     PT 15.6     RBC 2.44     RDW 14.2     Sodium 140     WBC 5.45           Significant Imaging: I have reviewed all pertinent imaging results/findings within the past 24 hours.      Assessment/Plan:   8/19/2020  Pt is calm and cooperative at present  A)  Dementia with encephalopathy with control at present  P)  Continue present therapy  SNF placement     8/18/2020  Pt denies any pain  He is progressing with PT  A)  Left hip Fx-S/P repair and progressing with PT  P)  SNF placement   * Closed displaced fracture of left femoral neck  S/p  Left hip hemiarthroplasty POD#1  PT/OT recommended SNF  SW Consulted    Anticoagulated  No idea why pt is on Eliquis at home      BRITT (acute kidney injury)  BRITT per today's labs  D/C all Nephrotoxins(HCTZ/ACE Inh/Lasix)      HTN (hypertension)  Continue present Tx  Patient need prescription for hydralazine when he goes home  Seems Hydralazine is the only med which finally brought his BP down    DNR (do not resuscitate)  DNR      Dementia  Stable issue      CKD (chronic kidney disease), stage III  Chronic stable issue        VTE Risk Mitigation (From admission, onward)         Ordered     enoxaparin injection 40 mg  Every 24 hours (non-standard times)      08/17/20 0858     IP VTE HIGH RISK PATIENT  Once      08/16/20 2128     Place ADONIS hose  Until discontinued      08/16/20  2128     Place sequential compression device  Until discontinued      08/16/20 2128                Discharge Planning   RACHEL:      Code Status: DNR   Is the patient medically ready for discharge?:     Reason for patient still in hospital (select all that apply): Other (specify) SNF placement  Discharge Plan A: Skilled Nursing Facility                  Otilio Amador MD  Department of Hospital Medicine   [facility]

## 2020-08-19 NOTE — PLAN OF CARE
Problem: Occupational Therapy Goal  Goal: Occupational Therapy Goal  Description: Goals to be met by: discharge     Patient will increase functional independence with ADLs by performing:    LE Dressing with Minimal Assistance and AD as needed.  Grooming while seated with Supervision.  Toileting from bedside commode with Stand-by Assistance for hygiene and clothing management.   Toilet transfer to bedside commode with Stand-by Assistance.    Outcome: Ongoing, Progressing

## 2020-08-19 NOTE — PT/OT/SLP PROGRESS
Physical Therapy Treatment    Patient Name:  Tomasz Chavez   MRN:  [id number]    Recommendations:     Discharge Recommendations:  nursing facility, skilled   Discharge Equipment Recommendations: walker, rolling   Barriers to discharge: None    Assessment:     Tomasz Chavez is a 79 y.o. male admitted with a medical diagnosis of Closed displaced fracture of left femoral neck.  He presents with the following impairments/functional limitations:  weakness, impaired endurance, impaired self care skills, impaired functional mobilty, gait instability, impaired balance, decreased lower extremity function, decreased safety awareness, pain, decreased ROM. Patient presents resting in bed with HOB elevated; safety sitter present. Patient agreeable to PT treatment. Patient able to transfer sit > stand and initiate gait training but taking side steps. Patient also able to ambulate steps forward however when instructed to take steps back patient began turning around in [place]. Patient with difficulty following commands and demonstrates confusion throughout treatment requiring constant verbal cueing for RW management and sequencing to maintain safety. Pt unable to fully extend L knee in standing. Continue with PT and POC.    Rehab Prognosis: Good; patient would benefit from acute skilled PT services to address these deficits and reach maximum level of function.    Recent Surgery: Procedure(s) (LRB):  HEMIARTHROPLASTY, HIP (Left) 3 Days Post-Op    Plan:     During this hospitalization, patient to be seen daily to address the identified rehab impairments via gait training, therapeutic activities, therapeutic exercises and progress toward the following goals:    · Plan of Care Expires:  09/15/20    Subjective     Chief Complaint: No complaints  Patient/Family Comments/goals:   Pain/Comfort:  · Pain Rating 1: 0/10      Objective:     Communicated with RN prior to session.  Patient found HOB elevated with bed alarm upon PT entry to  room.     General Precautions: Standard, fall   Orthopedic Precautions:LLE weight bearing as tolerated   Braces:       Functional Mobility:  · Bed Mobility:     · Supine to Sit: maximal assistance  · Sit to Supine: maximal assistance  · Transfers:     · Sit to Stand:  moderate assistance with rolling walker  · Gait: 5 side steps; 8ft with RW and Mod Assist; constant verbal cueing for RW management and sequencing      AM-PAC 6 CLICK MOBILITY          Therapeutic Activities and Exercises:   bed mobility; sitting EOB for trunk control and midline orientation; sit <> stands; transfer training; weight bearing status education    Patient left HOB elevated with all lines intact, call button in reach, bed alarm on and safety sitter present..    GOALS:   Multidisciplinary Problems     Physical Therapy Goals        Problem: Physical Therapy Goal    Goal Priority Disciplines Outcome Goal Variances Interventions   Physical Therapy Goal     PT, PT/OT Ongoing, Progressing     Description: Goals to be met by: 9/15/20     Patient will increase functional independence with mobility by performin. Supine to sit with Stand-by Assistance  2. Sit to stand transfer with Minimal Assistance  3. Gait  x 150 feet with Minimal Assistance using Rolling Walker.   4. Lower extremity exercise program x20-30 reps per handout, with supervision                     Time Tracking:     PT Received On: 20  PT Start Time: 1031     PT Stop Time: 1046  PT Total Time (min): 15 min     Billable Minutes: Gait Training 15    Treatment Type: Treatment  PT/PTA: PTA     PTA Visit Number: 2     Nina Phan PTA  2020

## 2020-08-19 NOTE — SUBJECTIVE & OBJECTIVE
Interval History: Mr Chavez is calm and cooperative with no complaints of pain    Review of Systems   Unable to perform ROS: Dementia     Objective:     Vital Signs (Most Recent):  Temp: 98.1 °F (36.7 °C) (08/19/20 0818)  Pulse: 84 (08/19/20 0818)  Resp: 18 (08/19/20 0818)  BP: (!) 163/80 (08/19/20 0818)  SpO2: 98 % (08/19/20 0818) Vital Signs (24h Range):  Temp:  [98.1 °F (36.7 °C)-99.3 °F (37.4 °C)] 98.1 °F (36.7 °C)  Pulse:  [] 84  Resp:  [18-20] 18  SpO2:  [93 %-98 %] 98 %  BP: (145-166)/(78-85) 163/80     Weight: 74.8 kg (165 lb)  Body mass index is 25.09 kg/m².    Intake/Output Summary (Last 24 hours) at 8/19/2020 1120  Last data filed at 8/19/2020 0700  Gross per 24 hour   Intake 200 ml   Output --   Net 200 ml      Physical Exam  Vitals signs and nursing note reviewed.   Constitutional:       Appearance: Normal appearance.      Comments: 0/10 painful faces   HENT:      Head: Normocephalic and atraumatic.      Nose: Nose normal.      Mouth/Throat:      Mouth: Mucous membranes are moist.   Eyes:      Extraocular Movements: Extraocular movements intact.      Pupils: Pupils are equal, round, and reactive to light.   Neck:      Musculoskeletal: Normal range of motion and neck supple.   Cardiovascular:      Rate and Rhythm: Normal rate and regular rhythm.   Pulmonary:      Effort: Pulmonary effort is normal.      Comments: Diminished bibasilar breath sounds  Abdominal:      General: Bowel sounds are normal. There is no distension.      Tenderness: There is no abdominal tenderness.   Musculoskeletal:      Comments: Poor cooperation with the exam   Skin:     General: Skin is warm.   Neurological:      Mental Status: He is alert.      Comments: Poor cooperation with the exam   Psychiatric:      Comments: Poor cooperation with the exam         Significant Labs:   Recent Lab Results       08/19/20  0429        Albumin 2.6     Alkaline Phosphatase 48     ALT 8     Anion Gap 9     AST 33     Baso # 0.01      Basophil% 0.2     BILIRUBIN TOTAL 0.6  Comment:  For infants and newborns, interpretation of results should be based  on gestational age, weight and in agreement with clinical  observations.  Premature Infant recommended reference ranges:  Up to 24 hours.............<8.0 mg/dL  Up to 48 hours............<12.0 mg/dL  3-5 days..................<15.0 mg/dL  6-29 days.................<15.0 mg/dL       BUN, Bld 34     Calcium 8.1     Chloride 105     CO2 26     Creatinine 1.5     Differential Method Automated     eGFR if African American 49.8     eGFR if non  43.0  Comment:  Calculation used to obtain the estimated glomerular filtration  rate (eGFR) is the CKD-EPI equation.        Eos # 0.2     Eosinophil% 2.8     Glucose 105     Gran # (ANC) 4.1     Gran% 74.6     Hematocrit 24.9     Hemoglobin 8.1     Immature Grans (Abs) 0.02  Comment:  Mild elevation in immature granulocytes is non specific and   can be seen in a variety of conditions including stress response,   acute inflammation, trauma and pregnancy. Correlation with other   laboratory and clinical findings is essential.       Immature Granulocytes 0.4     INR 1.3  Comment:  Coumadin Therapy:  INR: 2.0-3.0 conventional anticoagulation  INR: 2.5-3.5 intensive anticoagulation       Lymph # 1.0     Lymph% 17.6     Magnesium 1.9     MCH 33.2     MCHC 32.5          Mono # 0.2     Mono% 4.4     MPV 11.0     nRBC 0     Platelets 163     Potassium 3.5     PROTEIN TOTAL 5.6     PT 15.6     RBC 2.44     RDW 14.2     Sodium 140     WBC 5.45           Significant Imaging: I have reviewed all pertinent imaging results/findings within the past 24 hours.

## 2020-08-19 NOTE — PROGRESS NOTES
"[facility]  Adult Nutrition   Progress Note (Initial Assessment)     SUMMARY     Recommendations  Recommendation/Intervention: 1. Continue present diet as tolerated by patient 2. Provide meal assist and encouragement as needed  Goals: 1. Patient to meet at least 75% estimated calorie and protein needs through oral diet  Nutrition Goal Status: new  Communication of RD Recs: discussed on rounds    Dietitian Rounds Brief    Pt seen 2" LOS. Noted s/p L hip hemiarthroplasty (8/16). Sitter present at bedside. Observed bkfst tray; consumed ~90% bkfst. Tolerating diet per sitter. No s/s dysphagia noted.     Reason for Assessment  Reason For Assessment: length of stay  Diagnosis: surgery/postoperative complications  Relevant Medical History: GERD, dementia, HTN, DELORIS  Interdisciplinary Rounds: attended    Nutrition Risk Screen  Nutrition Risk Screen: no indicators present     Nutrition/Diet History  Patient Reported Diet/Restrictions/Preferences: general  Spiritual, Cultural Beliefs, Advent Practices, Values that Affect Care: no  Food Allergies: NKFA  Factors Affecting Nutritional Intake: None identified at this time    Anthropometrics  Temp: 98.1 °F (36.7 °C)  Height Method: Stated  Height: 5' 8" (172.7 cm)  Height (inches): 68 in  Weight Method: Bed Scale  Weight: 74.8 kg (165 lb)  Weight (lb): 165 lb  Ideal Body Weight (IBW), Male: 154 lb  % Ideal Body Weight, Male (lb): 107.14 %  BMI (Calculated): 25.1  BMI Grade: 25 - 29.9 - overweight       Weight History:  Wt Readings from Last 10 Encounters:   08/19/20 74.8 kg (165 lb)   08/11/20 76.6 kg (168 lb 14 oz)   08/10/20 78 kg (172 lb)   07/17/20 82.4 kg (181 lb 10.5 oz)   10/11/16 113.4 kg (250 lb)   10/05/16 111.1 kg (245 lb)   09/22/16 112.1 kg (247 lb 2.2 oz)       Lab/Procedures/Meds: Pertinent Labs Reviewed    Clinical Chemistry:  Recent Labs   Lab 08/19/20  0429      K 3.5      CO2 26      BUN 34*   CREATININE 1.5*   CALCIUM 8.1* "   PROT 5.6*   ALBUMIN 2.6*   BILITOT 0.6   ALKPHOS 48*   AST 33   ALT 8*   ANIONGAP 9   ESTGFRAFRICA 49.8*   EGFRNONAA 43.0*   MG 1.9       CBC:   Recent Labs   Lab 08/19/20  0429   WBC 5.45   RBC 2.44*   HGB 8.1*   HCT 24.9*      *   MCH 33.2*   MCHC 32.5         Medications: Pertinent Medications reviewed    Scheduled Meds:   atorvastatin  20 mg Oral Nightly    enoxparin  40 mg Subcutaneous Q24H    hydrALAZINE  25 mg Oral Q8H    isosorbide mononitrate  60 mg Oral Daily       Continuous Infusions:    PRN Meds:.cloNIDine, haloperidol lactate, hydrALAZINE, HYDROcodone-acetaminophen, HYDROcodone-acetaminophen, HYDROcodone-acetaminophen, magnesium oxide, magnesium oxide, morphine, ondansetron, potassium chloride, potassium chloride, sodium chloride 0.9%    Estimated/Assessed Needs  Weight Used For Calorie Calculations: 74.8 kg (164 lb 14.5 oz)  Energy Calorie Requirements (kcal): 4276-2263 (25-30 kcal/kg)  Energy Need Method: Kcal/kg  Protein Requirements: 75-90 g (1-1.2 g/kg)  Weight Used For Protein Calculations: 74.8 kg (164 lb 14.5 oz)     Estimated Fluid Requirement Method: RDA Method  RDA Method (mL): 1870       Nutrition Prescription Ordered  Current Diet Order: Regular    Evaluation of Received Nutrient/Fluid Intake  Energy Calories Required: meeting needs  Protein Required: meeting needs  Fluid Required: meeting needs  Tolerance: tolerating     Intake/Output Summary (Last 24 hours) at 8/19/2020 0919  Last data filed at 8/19/2020 0700  Gross per 24 hour   Intake 200 ml   Output --   Net 200 ml        % Meal Intake: 75 - 100 %    Nutrition Risk  Level of Risk/Frequency of Follow-up: moderate     Monitor and Evaluation  Food and Nutrient Intake: food and beverage intake, energy intake  Food and Nutrient Adminstration: diet order  Physical Activity and Function: nutrition-related ADLs and IADLs  Anthropometric Measurements: weight, weight change  Biochemical Data, Medical Tests and Procedures:  electrolyte and renal panel, gastrointestinal profile, glucose/endocrine profile, inflammatory profile  Nutrition-Focused Physical Findings: overall appearance     Nutrition Follow-Up  RD Follow-up?: Yes    Prema Peña RD 08/19/2020 9:19 AM

## 2020-08-20 LAB
ALBUMIN SERPL BCP-MCNC: 2.5 G/DL (ref 3.5–5.2)
ALP SERPL-CCNC: 48 U/L (ref 55–135)
ALT SERPL W/O P-5'-P-CCNC: 9 U/L (ref 10–44)
ANION GAP SERPL CALC-SCNC: 7 MMOL/L (ref 8–16)
AST SERPL-CCNC: 30 U/L (ref 10–40)
BASOPHILS # BLD AUTO: 0.01 K/UL (ref 0–0.2)
BASOPHILS NFR BLD: 0.2 % (ref 0–1.9)
BILIRUB SERPL-MCNC: 0.7 MG/DL (ref 0.1–1)
BUN SERPL-MCNC: 35 MG/DL (ref 8–23)
CALCIUM SERPL-MCNC: 8.1 MG/DL (ref 8.7–10.5)
CHLORIDE SERPL-SCNC: 106 MMOL/L (ref 95–110)
CO2 SERPL-SCNC: 26 MMOL/L (ref 23–29)
CREAT SERPL-MCNC: 1.5 MG/DL (ref 0.5–1.4)
DIFFERENTIAL METHOD: ABNORMAL
EOSINOPHIL # BLD AUTO: 0.2 K/UL (ref 0–0.5)
EOSINOPHIL NFR BLD: 3.8 % (ref 0–8)
ERYTHROCYTE [DISTWIDTH] IN BLOOD BY AUTOMATED COUNT: 14 % (ref 11.5–14.5)
EST. GFR  (AFRICAN AMERICAN): 49.8 ML/MIN/1.73 M^2
EST. GFR  (NON AFRICAN AMERICAN): 43 ML/MIN/1.73 M^2
GLUCOSE SERPL-MCNC: 97 MG/DL (ref 70–110)
HCT VFR BLD AUTO: 22.8 % (ref 40–54)
HGB BLD-MCNC: 7.5 G/DL (ref 14–18)
IMM GRANULOCYTES # BLD AUTO: 0 K/UL (ref 0–0.04)
IMM GRANULOCYTES NFR BLD AUTO: 0 % (ref 0–0.5)
INR PPP: 1.3
LYMPHOCYTES # BLD AUTO: 1.3 K/UL (ref 1–4.8)
LYMPHOCYTES NFR BLD: 24.8 % (ref 18–48)
MAGNESIUM SERPL-MCNC: 1.9 MG/DL (ref 1.6–2.6)
MCH RBC QN AUTO: 33.6 PG (ref 27–31)
MCHC RBC AUTO-ENTMCNC: 32.9 G/DL (ref 32–36)
MCV RBC AUTO: 102 FL (ref 82–98)
MONOCYTES # BLD AUTO: 0.3 K/UL (ref 0.3–1)
MONOCYTES NFR BLD: 4.9 % (ref 4–15)
NEUTROPHILS # BLD AUTO: 3.5 K/UL (ref 1.8–7.7)
NEUTROPHILS NFR BLD: 66.3 % (ref 38–73)
NRBC BLD-RTO: 0 /100 WBC
PLATELET # BLD AUTO: 174 K/UL (ref 150–350)
PMV BLD AUTO: 10.7 FL (ref 9.2–12.9)
POTASSIUM SERPL-SCNC: 3.7 MMOL/L (ref 3.5–5.1)
PROT SERPL-MCNC: 5.3 G/DL (ref 6–8.4)
PROTHROMBIN TIME: 15.9 SEC (ref 10.6–14.8)
RBC # BLD AUTO: 2.23 M/UL (ref 4.6–6.2)
SODIUM SERPL-SCNC: 139 MMOL/L (ref 136–145)
WBC # BLD AUTO: 5.32 K/UL (ref 3.9–12.7)

## 2020-08-20 PROCEDURE — 97535 SELF CARE MNGMENT TRAINING: CPT

## 2020-08-20 PROCEDURE — 25000003 PHARM REV CODE 250: Performed by: ORTHOPAEDIC SURGERY

## 2020-08-20 PROCEDURE — 80053 COMPREHEN METABOLIC PANEL: CPT

## 2020-08-20 PROCEDURE — 25000003 PHARM REV CODE 250: Performed by: INTERNAL MEDICINE

## 2020-08-20 PROCEDURE — 63600175 PHARM REV CODE 636 W HCPCS: Performed by: INTERNAL MEDICINE

## 2020-08-20 PROCEDURE — 36415 COLL VENOUS BLD VENIPUNCTURE: CPT

## 2020-08-20 PROCEDURE — 83735 ASSAY OF MAGNESIUM: CPT

## 2020-08-20 PROCEDURE — 85610 PROTHROMBIN TIME: CPT

## 2020-08-20 PROCEDURE — 85025 COMPLETE CBC W/AUTO DIFF WBC: CPT

## 2020-08-20 PROCEDURE — 97116 GAIT TRAINING THERAPY: CPT

## 2020-08-20 PROCEDURE — 12000002 HC ACUTE/MED SURGE SEMI-PRIVATE ROOM

## 2020-08-20 RX ORDER — LACTULOSE 10 G/15ML
20 SOLUTION ORAL 3 TIMES DAILY
Status: DISCONTINUED | OUTPATIENT
Start: 2020-08-20 | End: 2020-08-26 | Stop reason: HOSPADM

## 2020-08-20 RX ORDER — HALOPERIDOL 5 MG/1
5 TABLET ORAL 2 TIMES DAILY
Status: DISCONTINUED | OUTPATIENT
Start: 2020-08-20 | End: 2020-08-26 | Stop reason: HOSPADM

## 2020-08-20 RX ADMIN — HYDRALAZINE HYDROCHLORIDE 25 MG: 25 TABLET, FILM COATED ORAL at 05:08

## 2020-08-20 RX ADMIN — LACTULOSE 20 G: 20 SOLUTION ORAL at 09:08

## 2020-08-20 RX ADMIN — HALOPERIDOL 5 MG: 5 TABLET ORAL at 12:08

## 2020-08-20 RX ADMIN — ISOSORBIDE MONONITRATE 60 MG: 60 TABLET, EXTENDED RELEASE ORAL at 11:08

## 2020-08-20 RX ADMIN — LACTULOSE 20 G: 20 SOLUTION ORAL at 02:08

## 2020-08-20 RX ADMIN — HYDROCODONE BITARTRATE AND ACETAMINOPHEN 1 TABLET: 10; 325 TABLET ORAL at 11:08

## 2020-08-20 RX ADMIN — HYDRALAZINE HYDROCHLORIDE 25 MG: 25 TABLET, FILM COATED ORAL at 09:08

## 2020-08-20 RX ADMIN — HALOPERIDOL 5 MG: 5 TABLET ORAL at 09:08

## 2020-08-20 RX ADMIN — ATORVASTATIN CALCIUM 20 MG: 20 TABLET, FILM COATED ORAL at 09:08

## 2020-08-20 RX ADMIN — ENOXAPARIN SODIUM 40 MG: 100 INJECTION SUBCUTANEOUS at 05:08

## 2020-08-20 RX ADMIN — HYDROCODONE BITARTRATE AND ACETAMINOPHEN 1 TABLET: 10; 325 TABLET ORAL at 12:08

## 2020-08-20 NOTE — PT/OT/SLP PROGRESS
Occupational Therapy   Treatment    Name: Tomasz Chavez  MRN: [id number]  Admitting Diagnosis:  Closed displaced fracture of left femoral neck  4 Days Post-Op    Recommendations:     Discharge Recommendations: nursing facility, skilled  Discharge Equipment Recommendations:  (TBD next level of care)  Barriers to discharge:  Decreased caregiver support    Assessment:     Tomasz Chavez is a 79 y.o. male with a medical diagnosis of Closed displaced fracture of left femoral neck.  Pt agreeable to OT therapy session this AM. Performance deficits affecting function are weakness, impaired endurance, impaired self care skills, impaired functional mobilty, gait instability, impaired balance, impaired cognition, decreased coordination, decreased lower extremity function, decreased safety awareness, pain, orthopedic precautions.     Rehab Prognosis:  Fair; patient would benefit from acute skilled OT services to address these deficits and reach maximum level of function.       Plan:     Patient to be seen 6 x/week to address the above listed problems via self-care/home management, therapeutic activities, therapeutic exercises  · Plan of Care Expires: 08/18/20  · Plan of Care Reviewed with: patient    Subjective     Pain/Comfort:  · Pain Rating 1: (not rated)  · Location - Side 1: Left  · Location 1: hip  · Pain Addressed 1: Pre-medicate for activity, Reposition, Cessation of Activity    Objective:     Communicated with: nursing prior to session.  Patient found HOB elevated with bed alarm upon OT entry to room.    General Precautions: Standard, fall   Orthopedic Precautions:LLE weight bearing as tolerated   Braces: N/A     Occupational Performance:     Bed Mobility:    · Patient completed Supine to Sit with moderate assistance  · Patient completed Sit to Supine with minimum assistance     Functional Mobility/Transfers:  · Patient completed Sit <> Stand Transfer with minimum assistance  with  rolling walker   · Functional  Mobility: pt took a few steps forward and reported increased pain and declined further OT activites    Activities of Daily Living:  · Feeding:  pt required cues for initiation to grab cup of water    · Upper Body Dressing: moderate assistance to don gown    Treatment & Education:  Pt educated on use of call bell and safety during ADLs, transfers, and functional mobility.    Patient left HOB elevated with all lines intact, call button in reach, bed alarm on and RN notifiedEducation:      GOALS:   Multidisciplinary Problems     Occupational Therapy Goals        Problem: Occupational Therapy Goal    Goal Priority Disciplines Outcome Interventions   Occupational Therapy Goal     OT, PT/OT Ongoing, Progressing    Description: Goals to be met by: discharge     Patient will increase functional independence with ADLs by performing:    LE Dressing with Minimal Assistance and AD as needed.  Grooming while seated with Supervision.  Toileting from bedside commode with Stand-by Assistance for hygiene and clothing management.   Toilet transfer to bedside commode with Stand-by Assistance.                     Time Tracking:     OT Date of Treatment: 08/20/20  OT Start Time: 1046  OT Stop Time: 1103  OT Total Time (min): 17 min    Billable Minutes:Self Care/Home Management 17    Rashmi Huff OT  8/20/2020

## 2020-08-20 NOTE — PLAN OF CARE
Problem: Adult Inpatient Plan of Care  Goal: Patient-Specific Goal (Individualization)  Outcome: Ongoing, Progressing     Problem: Skin Injury Risk Increased  Goal: Skin Health and Integrity  Outcome: Ongoing, Progressing

## 2020-08-20 NOTE — SUBJECTIVE & OBJECTIVE
Interval History: Pt has no agitation this AM and is cooperating with PT    Review of Systems   Unable to perform ROS: Dementia     Objective:     Vital Signs (Most Recent):  Temp: 98.2 °F (36.8 °C) (08/20/20 0814)  Pulse: 95 (08/20/20 0814)  Resp: 18 (08/20/20 0814)  BP: 133/75 (08/20/20 0814)  SpO2: 96 % (08/20/20 0814) Vital Signs (24h Range):  Temp:  [97.8 °F (36.6 °C)-98.9 °F (37.2 °C)] 98.2 °F (36.8 °C)  Pulse:  [82-95] 95  Resp:  [16-18] 18  SpO2:  [96 %-99 %] 96 %  BP: (130-150)/(62-85) 133/75     Weight: 74.8 kg (165 lb)  Body mass index is 25.09 kg/m².    Intake/Output Summary (Last 24 hours) at 8/20/2020 1121  Last data filed at 8/19/2020 1306  Gross per 24 hour   Intake --   Output 300 ml   Net -300 ml      Physical Exam  Vitals signs and nursing note reviewed.   Constitutional:       Appearance: Normal appearance.   HENT:      Head: Normocephalic and atraumatic.      Nose: Nose normal.      Mouth/Throat:      Mouth: Mucous membranes are moist.   Eyes:      Extraocular Movements: Extraocular movements intact.      Pupils: Pupils are equal, round, and reactive to light.   Neck:      Musculoskeletal: Normal range of motion and neck supple.   Cardiovascular:      Rate and Rhythm: Normal rate and regular rhythm.      Heart sounds: Gallop present.    Pulmonary:      Effort: Pulmonary effort is normal.      Breath sounds: Normal breath sounds.   Abdominal:      General: Bowel sounds are normal. There is no distension.      Tenderness: There is no abdominal tenderness.   Musculoskeletal: Normal range of motion.   Skin:     General: Skin is warm.   Neurological:      Mental Status: He is alert.      Comments: Limited cooperation with the exam   Psychiatric:      Comments: Limited cooperation with the exam         Significant Labs:   Recent Lab Results       08/20/20  0457   08/19/20  1141        Albumin 2.5       Alkaline Phosphatase 48       ALT 9       Anion Gap 7       AST 30       Baso # 0.01       Basophil%  0.2       BILIRUBIN TOTAL 0.7  Comment:  For infants and newborns, interpretation of results should be based  on gestational age, weight and in agreement with clinical  observations.  Premature Infant recommended reference ranges:  Up to 24 hours.............<8.0 mg/dL  Up to 48 hours............<12.0 mg/dL  3-5 days..................<15.0 mg/dL  6-29 days.................<15.0 mg/dL         BUN, Bld 35       Calcium 8.1       Chloride 106       CO2 26       Creatinine 1.5       Differential Method Automated       eGFR if African American 49.8       eGFR if non  43.0  Comment:  Calculation used to obtain the estimated glomerular filtration  rate (eGFR) is the CKD-EPI equation.          Eos # 0.2       Eosinophil% 3.8       Glucose 97       Gran # (ANC) 3.5       Gran% 66.3       Hematocrit 22.8       Hemoglobin 7.5       Immature Grans (Abs) 0.00  Comment:  Mild elevation in immature granulocytes is non specific and   can be seen in a variety of conditions including stress response,   acute inflammation, trauma and pregnancy. Correlation with other   laboratory and clinical findings is essential.         Immature Granulocytes 0.0       INR 1.3  Comment:  Coumadin Therapy:  INR: 2.0-3.0 conventional anticoagulation  INR: 2.5-3.5 intensive anticoagulation         Lymph # 1.3       Lymph% 24.8       Magnesium 1.9       MCH 33.6       MCHC 32.9              Mono # 0.3       Mono% 4.9       MPV 10.7       nRBC 0       Platelets 174       Potassium 3.7       PROTEIN TOTAL 5.3       PT 15.9       RBC 2.23       RDW 14.0       SARS-CoV2 (COVID-19) Qualitative PCR   Not Detected  Comment:  This test utilizes a real-time reverse transcription  polymerase chain reaction procedure to amplify and   detect the SARS-CoV-2 and detect the SARS-CoV-2 N2 and E nucleic  acid targets. The analytical sensitivity (limit of detection) of   this assay is 250 copies/mL.  A Detected result implies that the patient is  infected with the  SARS-CoV-2 virus and is presumed to be contagious.    A Not Detected result implies that the SARS-CoV-2 target nucleic  acids are not present above the limit of detection.  It does not  rule out the possibility of COVID-19 and should not be the sole  basis for treatment decisions. If COVID-19 is strongly suspected  based on clinical and epidemiological history, re-testing should  be considered.  This test is only for use under Food and Drug   Administration s Emergency Use Authorization (EUA).   Commercial reagents are provided by [employer].  Performance characteristics of the EUA have been   independently verified by [facility]   Department of Pathology and Laboratory Medicine.         Sodium 139       WBC 5.32             Significant Imaging: I have reviewed all pertinent imaging results/findings within the past 24 hours.

## 2020-08-20 NOTE — PLAN OF CARE
"   08/20/20 0915   Discharge Reassessment   Assessment Type Discharge Planning Reassessment   Anticipated Discharge Disposition SNF   Post-Acute Status   Post-Acute Authorization Placement   Post-Acute Placement Status Referrals Sent   8/19 @1005 placed call to patient's sonTyler discussing SNF preference. Preference is [facility] as patient "has been there before and they know him". Choice completed with Tyler vee via telephone and scanned to chart via media management. Referral sent to [facility] via Epic and call placed this am and was informed "they are in their morning meeting; call back in 30 minutes". CM following.     PASRR and 142 scanned to chart via media management.     Received a call back from Mariia with [facility] stating "unable to accept". CM following.   "

## 2020-08-20 NOTE — PROGRESS NOTES
[facility]  Progress Note    Patient Name: Tomasz Chavez  MRN: [id number]  Patient Class: IP- Inpatient   Admission Date: 8/15/2020  Length of Stay: 5 days  Attending Physician: Otilio Amador MD  Primary Care Provider: Kendrick Hatfield MD        Subjective:     Principal Problem:Closed displaced fracture of left femoral neck        HPI:  79 year old patient admitted with  Acute subcapital fracture of left femoral neck, with mild impaction  Patient was walking around today and had a fall  Later he felt severe pain on L hip area and was unable to move LLE  Patient described pain as severe/constant/with no radiation  Also he was unable to support his weight       Overview/Hospital Course:  08/16  Pt today had Left hip hemiarthroplasty  No other issues  BP well controlled    08/17  Pt had confused episodes yesterday night  Sitter near bedside    8/18/2020  Pt denies any pain and is progressing with PT. Interaction limited by dementia.    8/19/2020  Pt had PM encephalopathy that has resolved. He states that he has no pain, no problems.    8/20/2020  Mr Chavez is cooperative and working with PT this AM. No agitation    Interval History: Pt has no agitation this AM and is cooperating with PT    Review of Systems   Unable to perform ROS: Dementia     Objective:     Vital Signs (Most Recent):  Temp: 98.2 °F (36.8 °C) (08/20/20 0814)  Pulse: 95 (08/20/20 0814)  Resp: 18 (08/20/20 0814)  BP: 133/75 (08/20/20 0814)  SpO2: 96 % (08/20/20 0814) Vital Signs (24h Range):  Temp:  [97.8 °F (36.6 °C)-98.9 °F (37.2 °C)] 98.2 °F (36.8 °C)  Pulse:  [82-95] 95  Resp:  [16-18] 18  SpO2:  [96 %-99 %] 96 %  BP: (130-150)/(62-85) 133/75     Weight: 74.8 kg (165 lb)  Body mass index is 25.09 kg/m².    Intake/Output Summary (Last 24 hours) at 8/20/2020 1121  Last data filed at 8/19/2020 1306  Gross per 24 hour   Intake --   Output 300 ml   Net -300 ml      Physical Exam  Vitals signs and nursing note  reviewed.   Constitutional:       Appearance: Normal appearance.   HENT:      Head: Normocephalic and atraumatic.      Nose: Nose normal.      Mouth/Throat:      Mouth: Mucous membranes are moist.   Eyes:      Extraocular Movements: Extraocular movements intact.      Pupils: Pupils are equal, round, and reactive to light.   Neck:      Musculoskeletal: Normal range of motion and neck supple.   Cardiovascular:      Rate and Rhythm: Normal rate and regular rhythm.      Heart sounds: Gallop present.    Pulmonary:      Effort: Pulmonary effort is normal.      Breath sounds: Normal breath sounds.   Abdominal:      General: Bowel sounds are normal. There is no distension.      Tenderness: There is no abdominal tenderness.   Musculoskeletal: Normal range of motion.   Skin:     General: Skin is warm.   Neurological:      Mental Status: He is alert.      Comments: Limited cooperation with the exam   Psychiatric:      Comments: Limited cooperation with the exam         Significant Labs:   Recent Lab Results       08/20/20  0457   08/19/20  1141        Albumin 2.5       Alkaline Phosphatase 48       ALT 9       Anion Gap 7       AST 30       Baso # 0.01       Basophil% 0.2       BILIRUBIN TOTAL 0.7  Comment:  For infants and newborns, interpretation of results should be based  on gestational age, weight and in agreement with clinical  observations.  Premature Infant recommended reference ranges:  Up to 24 hours.............<8.0 mg/dL  Up to 48 hours............<12.0 mg/dL  3-5 days..................<15.0 mg/dL  6-29 days.................<15.0 mg/dL         BUN, Bld 35       Calcium 8.1       Chloride 106       CO2 26       Creatinine 1.5       Differential Method Automated       eGFR if African American 49.8       eGFR if non  43.0  Comment:  Calculation used to obtain the estimated glomerular filtration  rate (eGFR) is the CKD-EPI equation.          Eos # 0.2       Eosinophil% 3.8       Glucose 97       Gran #  (ANC) 3.5       Gran% 66.3       Hematocrit 22.8       Hemoglobin 7.5       Immature Grans (Abs) 0.00  Comment:  Mild elevation in immature granulocytes is non specific and   can be seen in a variety of conditions including stress response,   acute inflammation, trauma and pregnancy. Correlation with other   laboratory and clinical findings is essential.         Immature Granulocytes 0.0       INR 1.3  Comment:  Coumadin Therapy:  INR: 2.0-3.0 conventional anticoagulation  INR: 2.5-3.5 intensive anticoagulation         Lymph # 1.3       Lymph% 24.8       Magnesium 1.9       MCH 33.6       MCHC 32.9              Mono # 0.3       Mono% 4.9       MPV 10.7       nRBC 0       Platelets 174       Potassium 3.7       PROTEIN TOTAL 5.3       PT 15.9       RBC 2.23       RDW 14.0       SARS-CoV2 (COVID-19) Qualitative PCR   Not Detected  Comment:  This test utilizes a real-time reverse transcription  polymerase chain reaction procedure to amplify and   detect the SARS-CoV-2 and detect the SARS-CoV-2 N2 and E nucleic  acid targets. The analytical sensitivity (limit of detection) of   this assay is 250 copies/mL.  A Detected result implies that the patient is infected with the  SARS-CoV-2 virus and is presumed to be contagious.    A Not Detected result implies that the SARS-CoV-2 target nucleic  acids are not present above the limit of detection.  It does not  rule out the possibility of COVID-19 and should not be the sole  basis for treatment decisions. If COVID-19 is strongly suspected  based on clinical and epidemiological history, re-testing should  be considered.  This test is only for use under Food and Drug   Administration s Emergency Use Authorization (EUA).   Commercial reagents are provided by [employer].  Performance characteristics of the EUA have been   independently verified by [facility]   Department of Pathology and Laboratory Medicine.         Sodium 139       WBC 5.32             Significant  Imaging: I have reviewed all pertinent imaging results/findings within the past 24 hours.      Assessment/Plan:   8/20/2020  Mr Chavez is cooperating with PT and has no evidence of agitation  A)  Dementia with deliriun resolved at present   Minimal complaints of lower extremity pain  P)  D/C IM haldol and begin haldol 5 mg po BID  Awaiting SNF placement      * Closed displaced fracture of left femoral neck  S/p  Left hip hemiarthroplasty POD#1  PT/OT recommended SNF  SW Consulted    Anticoagulated  No idea why pt is on Eliquis at home      BRITT (acute kidney injury)  BRITT per today's labs  D/C all Nephrotoxins(HCTZ/ACE Inh/Lasix)      HTN (hypertension)  Continue present Tx  Patient need prescription for hydralazine when he goes home  Seems Hydralazine is the only med which finally brought his BP down    DNR (do not resuscitate)  DNR      Dementia  Stable issue      CKD (chronic kidney disease), stage III  Chronic stable issue        VTE Risk Mitigation (From admission, onward)         Ordered     enoxaparin injection 40 mg  Every 24 hours (non-standard times)      08/17/20 0858     IP VTE HIGH RISK PATIENT  Once      08/16/20 2128     Place ADONIS hose  Until discontinued      08/16/20 2128     Place sequential compression device  Until discontinued      08/16/20 2128                Discharge Planning   RACHEL:      Code Status: DNR   Is the patient medically ready for discharge?:     Reason for patient still in hospital (select all that apply): Other (specify) Awaiting SNF placement-delirium resolving  Discharge Plan A: Skilled Nursing Facility                  Otilio Amador MD  Department of Hospital Medicine   [facility]

## 2020-08-20 NOTE — PLAN OF CARE
Problem: Fall Injury Risk  Goal: Absence of Fall and Fall-Related Injury  Outcome: Ongoing, Progressing     Problem: Adult Inpatient Plan of Care  Goal: Plan of Care Review  Outcome: Ongoing, Progressing  Goal: Patient-Specific Goal (Individualization)  Outcome: Ongoing, Progressing  Goal: Absence of Hospital-Acquired Illness or Injury  Outcome: Ongoing, Progressing  Goal: Optimal Comfort and Wellbeing  Outcome: Ongoing, Progressing  Goal: Readiness for Transition of Care  Outcome: Ongoing, Progressing  Goal: Rounds/Family Conference  Outcome: Ongoing, Progressing     Problem: Infection  Goal: Infection Symptom Resolution  Outcome: Ongoing, Progressing     Problem: Pain Acute  Goal: Optimal Pain Control  Outcome: Ongoing, Progressing     Problem: Skin Injury Risk Increased  Goal: Skin Health and Integrity  Outcome: Ongoing, Progressing     Problem: Obstructive Sleep Apnea Risk or Actual  Goal: Unobstructed Breathing During Sleep  Outcome: Ongoing, Progressing     Problem: Hypertension Acute  Goal: Blood Pressure Within Desired Range  Outcome: Ongoing, Progressing     Problem: Wound  Goal: Optimal Wound Healing  Outcome: Ongoing, Progressing     Problem: Electrolyte Imbalance (Acute Kidney Injury/Impairment)  Goal: Serum Electrolyte Balance  Outcome: Ongoing, Progressing     Problem: Fluid Imbalance (Acute Kidney Injury/Impairment)  Goal: Optimal Fluid Balance  Outcome: Ongoing, Progressing     Problem: Hematologic Alteration (Acute Kidney Injury/Impairment)  Goal: Hemoglobin, Hematocrit and Platelets Within Normal Range  Outcome: Ongoing, Progressing     Problem: Oral Intake Inadequate (Acute Kidney Injury/Impairment)  Goal: Optimal Nutrition Intake  Outcome: Ongoing, Progressing     Problem: Renal Function Impairment (Acute Kidney Injury/Impairment)  Goal: Effective Renal Function  Outcome: Ongoing, Progressing     Problem: Bowel Elimination Impaired (Orthopaedic Fracture)  Goal: Effective Bowel  Elimination  Outcome: Ongoing, Progressing     Problem: Functional Ability Impaired (Orthopaedic Fracture)  Goal: Optimal Functional Ability  Outcome: Ongoing, Progressing

## 2020-08-20 NOTE — PT/OT/SLP PROGRESS
Physical Therapy Treatment    Patient Name:  Tomasz Chavez   MRN:  [id number]    Recommendations:     Discharge Recommendations:  nursing facility, skilled   Discharge Equipment Recommendations: walker, rolling   Barriers to discharge: None    Assessment:     Tomasz Chavez is a 79 y.o. male admitted with a medical diagnosis of Closed displaced fracture of left femoral neck.  He presents with the following impairments/functional limitations:  weakness, impaired endurance, impaired functional mobilty, gait instability, impaired balance, decreased lower extremity function, pain, decreased ROM, orthopedic precautions .  Patient eagerly agreeable to PT treatment to include gait training this afternoon.  Patient presented supine in bed and required max assist to transfer to sitting but mod assist to stand.  Patient then ambulated 2 x 40 feet with RW with min assist but was very spontaneous when he want to sit due to hip pain sitting down fairly quickly.      Rehab Prognosis: Good; patient would benefit from acute skilled PT services to address these deficits and reach maximum level of function.    Recent Surgery: Procedure(s) (LRB):  HEMIARTHROPLASTY, HIP (Left) 4 Days Post-Op    Plan:     During this hospitalization, patient to be seen daily to address the identified rehab impairments via gait training, therapeutic exercises, therapeutic activities and progress toward the following goals:    · Plan of Care Expires:  09/15/20    Subjective     Chief Complaint: none given  Patient/Family Comments/goals: none given  Pain/Comfort:  · Pain Rating 1: (patient reported left hip pain but could not grade it)  · Pain Rating Post-Intervention 1: (patient reported increase left LE pain with gait but could not grade it)      Objective:     Communicated with nurse prior to session.  Patient found supine with bed alarm upon PT entry to room.     General Precautions: Standard, fall   Orthopedic Precautions:LLE weight bearing as  tolerated   Braces:       Functional Mobility:  · Bed Mobility:     · Supine to Sit: maximal assistance  · Transfers:     · Sit to Stand:  moderate assistance with rolling walker  · Gait: 2 x 40 feet with RW with min assist      AM-PAC 6 CLICK MOBILITY          Therapeutic Activities and Exercises:   gait training 2 x 40 feet with RW with min assist with antalgic gait pattern with inconsistent steps    Patient left supine with call button in reach, bed alarm on and nurse notified..    GOALS:   Multidisciplinary Problems     Physical Therapy Goals        Problem: Physical Therapy Goal    Goal Priority Disciplines Outcome Goal Variances Interventions   Physical Therapy Goal     PT, PT/OT Ongoing, Progressing     Description: Goals to be met by: 9/15/20     Patient will increase functional independence with mobility by performin. Supine to sit with Stand-by Assistance  2. Sit to stand transfer with Minimal Assistance  3. Gait  x 150 feet with Minimal Assistance using Rolling Walker.   4. Lower extremity exercise program x20-30 reps per handout, with supervision                     Time Tracking:     PT Received On: 20  PT Start Time: 1358     PT Stop Time: 1412  PT Total Time (min): 14 min     Billable Minutes: Gait Training 14    Treatment Type: Treatment  PT/PTA: PT     PTA Visit Number: 0     Chris MeGilligan, PT  2020

## 2020-08-21 LAB
ALBUMIN SERPL BCP-MCNC: 2.5 G/DL (ref 3.5–5.2)
ALP SERPL-CCNC: 45 U/L (ref 55–135)
ALT SERPL W/O P-5'-P-CCNC: 8 U/L (ref 10–44)
ANION GAP SERPL CALC-SCNC: 7 MMOL/L (ref 8–16)
AST SERPL-CCNC: 24 U/L (ref 10–40)
BASOPHILS # BLD AUTO: 0.01 K/UL (ref 0–0.2)
BASOPHILS NFR BLD: 0.2 % (ref 0–1.9)
BILIRUB SERPL-MCNC: 0.8 MG/DL (ref 0.1–1)
BUN SERPL-MCNC: 34 MG/DL (ref 8–23)
CALCIUM SERPL-MCNC: 7.9 MG/DL (ref 8.7–10.5)
CHLORIDE SERPL-SCNC: 108 MMOL/L (ref 95–110)
CO2 SERPL-SCNC: 25 MMOL/L (ref 23–29)
CREAT SERPL-MCNC: 1.5 MG/DL (ref 0.5–1.4)
DIFFERENTIAL METHOD: ABNORMAL
EOSINOPHIL # BLD AUTO: 0.3 K/UL (ref 0–0.5)
EOSINOPHIL NFR BLD: 6.7 % (ref 0–8)
ERYTHROCYTE [DISTWIDTH] IN BLOOD BY AUTOMATED COUNT: 13.9 % (ref 11.5–14.5)
EST. GFR  (AFRICAN AMERICAN): 49.8 ML/MIN/1.73 M^2
EST. GFR  (NON AFRICAN AMERICAN): 43 ML/MIN/1.73 M^2
GLUCOSE SERPL-MCNC: 98 MG/DL (ref 70–110)
HCT VFR BLD AUTO: 22.9 % (ref 40–54)
HGB BLD-MCNC: 7.5 G/DL (ref 14–18)
IMM GRANULOCYTES # BLD AUTO: 0.02 K/UL (ref 0–0.04)
IMM GRANULOCYTES NFR BLD AUTO: 0.4 % (ref 0–0.5)
INR PPP: 1.3
LYMPHOCYTES # BLD AUTO: 1.4 K/UL (ref 1–4.8)
LYMPHOCYTES NFR BLD: 28 % (ref 18–48)
MAGNESIUM SERPL-MCNC: 2.2 MG/DL (ref 1.6–2.6)
MCH RBC QN AUTO: 33.9 PG (ref 27–31)
MCHC RBC AUTO-ENTMCNC: 32.8 G/DL (ref 32–36)
MCV RBC AUTO: 104 FL (ref 82–98)
MONOCYTES # BLD AUTO: 0.3 K/UL (ref 0.3–1)
MONOCYTES NFR BLD: 6.1 % (ref 4–15)
NEUTROPHILS # BLD AUTO: 2.9 K/UL (ref 1.8–7.7)
NEUTROPHILS NFR BLD: 58.6 % (ref 38–73)
NRBC BLD-RTO: 0 /100 WBC
PLATELET # BLD AUTO: 195 K/UL (ref 150–350)
PMV BLD AUTO: 10.4 FL (ref 9.2–12.9)
POTASSIUM SERPL-SCNC: 3.5 MMOL/L (ref 3.5–5.1)
PROT SERPL-MCNC: 5.3 G/DL (ref 6–8.4)
PROTHROMBIN TIME: 16 SEC (ref 10.6–14.8)
RBC # BLD AUTO: 2.21 M/UL (ref 4.6–6.2)
SODIUM SERPL-SCNC: 140 MMOL/L (ref 136–145)
WBC # BLD AUTO: 4.93 K/UL (ref 3.9–12.7)

## 2020-08-21 PROCEDURE — 97116 GAIT TRAINING THERAPY: CPT

## 2020-08-21 PROCEDURE — 36415 COLL VENOUS BLD VENIPUNCTURE: CPT

## 2020-08-21 PROCEDURE — 85610 PROTHROMBIN TIME: CPT

## 2020-08-21 PROCEDURE — 25000003 PHARM REV CODE 250: Performed by: INTERNAL MEDICINE

## 2020-08-21 PROCEDURE — 25000003 PHARM REV CODE 250: Performed by: ORTHOPAEDIC SURGERY

## 2020-08-21 PROCEDURE — 63600175 PHARM REV CODE 636 W HCPCS: Performed by: INTERNAL MEDICINE

## 2020-08-21 PROCEDURE — 12000002 HC ACUTE/MED SURGE SEMI-PRIVATE ROOM

## 2020-08-21 PROCEDURE — 83735 ASSAY OF MAGNESIUM: CPT

## 2020-08-21 PROCEDURE — 97535 SELF CARE MNGMENT TRAINING: CPT

## 2020-08-21 PROCEDURE — 85025 COMPLETE CBC W/AUTO DIFF WBC: CPT

## 2020-08-21 PROCEDURE — 97530 THERAPEUTIC ACTIVITIES: CPT

## 2020-08-21 PROCEDURE — 80053 COMPREHEN METABOLIC PANEL: CPT

## 2020-08-21 RX ADMIN — LACTULOSE 20 G: 20 SOLUTION ORAL at 03:08

## 2020-08-21 RX ADMIN — HALOPERIDOL 5 MG: 5 TABLET ORAL at 09:08

## 2020-08-21 RX ADMIN — ATORVASTATIN CALCIUM 20 MG: 20 TABLET, FILM COATED ORAL at 08:08

## 2020-08-21 RX ADMIN — HALOPERIDOL 5 MG: 5 TABLET ORAL at 08:08

## 2020-08-21 RX ADMIN — HYDRALAZINE HYDROCHLORIDE 25 MG: 25 TABLET, FILM COATED ORAL at 06:08

## 2020-08-21 RX ADMIN — HYDRALAZINE HYDROCHLORIDE 25 MG: 25 TABLET, FILM COATED ORAL at 01:08

## 2020-08-21 RX ADMIN — ISOSORBIDE MONONITRATE 60 MG: 60 TABLET, EXTENDED RELEASE ORAL at 09:08

## 2020-08-21 RX ADMIN — HYDROCODONE BITARTRATE AND ACETAMINOPHEN 1 TABLET: 10; 325 TABLET ORAL at 11:08

## 2020-08-21 RX ADMIN — HYDROCODONE BITARTRATE AND ACETAMINOPHEN 1 TABLET: 10; 325 TABLET ORAL at 01:08

## 2020-08-21 RX ADMIN — LACTULOSE 20 G: 20 SOLUTION ORAL at 09:08

## 2020-08-21 RX ADMIN — ENOXAPARIN SODIUM 40 MG: 100 INJECTION SUBCUTANEOUS at 05:08

## 2020-08-21 RX ADMIN — LACTULOSE 20 G: 20 SOLUTION ORAL at 08:08

## 2020-08-21 RX ADMIN — HYDRALAZINE HYDROCHLORIDE 25 MG: 25 TABLET, FILM COATED ORAL at 08:08

## 2020-08-21 NOTE — PLAN OF CARE
Pain is moderately controlled, still restless and did not sleep much.  at bedside. No new or significant changes.

## 2020-08-21 NOTE — PROGRESS NOTES
[facility] Medicine  Progress Note    Patient Name: Tomasz Chavez  MRN: [id number]  Patient Class: IP- Inpatient   Admission Date: 8/15/2020  Length of Stay: 6 days  Attending Physician: Otilio Amador MD  Primary Care Provider: Kendrick Hatfield MD        Subjective:     Principal Problem:Closed displaced fracture of left femoral neck        HPI:  79 year old patient admitted with  Acute subcapital fracture of left femoral neck, with mild impaction  Patient was walking around today and had a fall  Later he felt severe pain on L hip area and was unable to move LLE  Patient described pain as severe/constant/with no radiation  Also he was unable to support his weight       Overview/Hospital Course:  08/16  Pt today had Left hip hemiarthroplasty  No other issues  BP well controlled    08/17  Pt had confused episodes yesterday night  Sitter near bedside    8/18/2020  Pt denies any pain and is progressing with PT. Interaction limited by dementia.    8/19/2020  Pt had PM encephalopathy that has resolved. He states that he has no pain, no problems.    8/20/2020  Mr Chavez is cooperative and working with PT this AM. No agitation    8/21/2020  Mr Chavez has had no agitation. He is cooperating with PT and staff.    Interval History: Mr Chavez is currently cooperating with staff without agitation.     Review of Systems   Unable to perform ROS: Dementia     Objective:     Vital Signs (Most Recent):  Temp: 98.1 °F (36.7 °C) (08/21/20 1650)  Pulse: 65 (08/21/20 1650)  Resp: 18 (08/21/20 1650)  BP: 136/84 (08/21/20 1650)  SpO2: 96 % (08/21/20 1650) Vital Signs (24h Range):  Temp:  [97.4 °F (36.3 °C)-99 °F (37.2 °C)] 98.1 °F (36.7 °C)  Pulse:  [] 65  Resp:  [17-20] 18  SpO2:  [93 %-98 %] 96 %  BP: (120-159)/(67-95) 136/84     Weight: 74.8 kg (165 lb)  Body mass index is 25.09 kg/m².  No intake or output data in the 24 hours ending 08/21/20 1807   Physical Exam  Vitals signs and nursing note reviewed.    Constitutional:       General: He is not in acute distress.     Appearance: Normal appearance. He is not ill-appearing.   HENT:      Head: Normocephalic and atraumatic.      Nose: Nose normal.      Mouth/Throat:      Mouth: Mucous membranes are moist.   Eyes:      Extraocular Movements: Extraocular movements intact.      Pupils: Pupils are equal, round, and reactive to light.   Neck:      Musculoskeletal: Normal range of motion and neck supple.   Cardiovascular:      Rate and Rhythm: Normal rate and regular rhythm.   Pulmonary:      Effort: Pulmonary effort is normal.      Breath sounds: Normal breath sounds.   Abdominal:      General: Bowel sounds are normal. There is no distension.      Palpations: Abdomen is soft.      Tenderness: There is no abdominal tenderness.   Musculoskeletal: Normal range of motion.   Skin:     General: Skin is warm.   Neurological:      Mental Status: He is alert.      Comments: Pt could not cooperate with the exam   Psychiatric:      Comments: Pt could not cooperate with the exam  Calm         Significant Labs:   Recent Lab Results       08/21/20  0600   08/21/20  0559        Albumin   2.5     Alkaline Phosphatase   45     ALT   8     Anion Gap   7     AST   24     Baso # 0.01       Basophil% 0.2       BILIRUBIN TOTAL   0.8  Comment:  For infants and newborns, interpretation of results should be based  on gestational age, weight and in agreement with clinical  observations.  Premature Infant recommended reference ranges:  Up to 24 hours.............<8.0 mg/dL  Up to 48 hours............<12.0 mg/dL  3-5 days..................<15.0 mg/dL  6-29 days.................<15.0 mg/dL       BUN, Bld   34     Calcium   7.9     Chloride   108     CO2   25     Creatinine   1.5     Differential Method Automated       eGFR if African American   49.8     eGFR if non    43.0  Comment:  Calculation used to obtain the estimated glomerular filtration  rate (eGFR) is the CKD-EPI equation.         Eos # 0.3       Eosinophil% 6.7       Glucose   98     Gran # (ANC) 2.9       Gran% 58.6       Hematocrit 22.9       Hemoglobin 7.5       Immature Grans (Abs) 0.02  Comment:  Mild elevation in immature granulocytes is non specific and   can be seen in a variety of conditions including stress response,   acute inflammation, trauma and pregnancy. Correlation with other   laboratory and clinical findings is essential.         Immature Granulocytes 0.4       INR 1.3  Comment:  Coumadin Therapy:  INR: 2.0-3.0 conventional anticoagulation  INR: 2.5-3.5 intensive anticoagulation         Lymph # 1.4       Lymph% 28.0       Magnesium   2.2     MCH 33.9       MCHC 32.8              Mono # 0.3       Mono% 6.1       MPV 10.4       nRBC 0       Platelets 195       Potassium   3.5     PROTEIN TOTAL   5.3     PT 16.0       RBC 2.21       RDW 13.9       Sodium   140     WBC 4.93             Significant Imaging: I have reviewed all pertinent imaging results/findings within the past 24 hours.      Assessment/Plan:   8/21/2020  Mr Chavez is currently calm and cooperating with staff  A)  Dementia with behavioral issues. His past behavior is limiting placement options  P)  Continue PT  Seeking SNF placement      * Closed displaced fracture of left femoral neck  S/p  Left hip hemiarthroplasty POD#1  PT/OT recommended SNF  SW Consulted    Anticoagulated  No idea why pt is on Eliquis at home      BRITT (acute kidney injury)  BRITT per today's labs  D/C all Nephrotoxins(HCTZ/ACE Inh/Lasix)      HTN (hypertension)  Continue present Tx  Patient need prescription for hydralazine when he goes home  Seems Hydralazine is the only med which finally brought his BP down    DNR (do not resuscitate)  DNR      Dementia  Stable issue      CKD (chronic kidney disease), stage III  Chronic stable issue        VTE Risk Mitigation (From admission, onward)         Ordered     enoxaparin injection 40 mg  Every 24 hours (non-standard times)      08/17/20  0858     IP VTE HIGH RISK PATIENT  Once      08/16/20 2128     Place ADONIS hose  Until discontinued      08/16/20 2128     Place sequential compression device  Until discontinued      08/16/20 2128                Discharge Planning   RACHEL:      Code Status: DNR   Is the patient medically ready for discharge?:     Reason for patient still in hospital (select all that apply): Other (specify) Pt's past behavior is limiting placement options  Discharge Plan A: Skilled Nursing Facility                  Otilio Amador MD  Department of Hospital Medicine   [facility]

## 2020-08-21 NOTE — PT/OT/SLP PROGRESS
Occupational Therapy   Treatment    Name: Tomasz Chavez  MRN: [id number]  Admitting Diagnosis:  Closed displaced fracture of left femoral neck  5 Days Post-Op    Recommendations:     Discharge Recommendations: nursing facility, skilled  Discharge Equipment Recommendations:  (TBD next level of care)  Barriers to discharge:  Decreased caregiver support    Assessment:     Tomasz Chavez is a 79 y.o. male with a medical diagnosis of Closed displaced fracture of left femoral neck.  Pt required max encouragement to participate this session. Performance deficits affecting function are weakness, impaired endurance, impaired self care skills, impaired functional mobilty, gait instability, impaired balance, impaired cognition, decreased lower extremity function, decreased safety awareness, pain, decreased ROM, orthopedic precautions. Pt impulsive and with decreased safety awareness during session, requiring max verbal/tactile cues for safety.    Rehab Prognosis:  Fair; patient would benefit from acute skilled OT services to address these deficits and reach maximum level of function.       Plan:     Patient to be seen 6 x/week to address the above listed problems via self-care/home management, therapeutic activities, therapeutic exercises  · Plan of Care Expires: 08/18/20  · Plan of Care Reviewed with: patient    Subjective     Pain/Comfort:  · Pain Rating 1: (not rated)  · Location - Side 1: Left  · Location 1: hip  · Pain Addressed 1: Reposition, Distraction    Objective:     Communicated with: nursing prior to session.  Patient found HOB elevated with bed alarm upon OT entry to room.    General Precautions: Standard, fall   Orthopedic Precautions:LLE weight bearing as tolerated   Braces: N/A     Occupational Performance:     Bed Mobility:    · Patient completed Scooting/Bridging with minimum assistance  · Patient completed Supine to Sit with minimum assistance, with side rail and HOB raised  · Patient completed Sit to  Supine with contact guard assistance     Functional Mobility/Transfers:  · Patient completed Sit <> Stand Transfer with contact guard assistance  with  rolling walker   · Functional Mobility: pt took a few steps then returned self to supine, declining further session    Activities of Daily Living:  · Lower Body Dressing: CGA seated EOB to doff sock; Min A to don R sock; Total A to doff/don L sock      Treatment & Education:  UE exercises 2 x 10 reps with SBA bed level and v/c's to continue reps. Pt educated on use of call bell and safety during ADLs and transfers.    Patient left HOB elevated with all lines intact, call button in reach and bed alarm onEducation:      GOALS:   Multidisciplinary Problems     Occupational Therapy Goals        Problem: Occupational Therapy Goal    Goal Priority Disciplines Outcome Interventions   Occupational Therapy Goal     OT, PT/OT Ongoing, Progressing    Description: Goals to be met by: discharge     Patient will increase functional independence with ADLs by performing:    LE Dressing with Minimal Assistance and AD as needed.  Grooming while seated with Supervision.  Toileting from bedside commode with Stand-by Assistance for hygiene and clothing management.   Toilet transfer to bedside commode with Stand-by Assistance.                     Time Tracking:     OT Date of Treatment: 08/21/20  OT Start Time: 1030  OT Stop Time: 1043  OT Total Time (min): 13 min    Billable Minutes:Self Care/Home Management 13    Rashmi Huff OT  8/21/2020

## 2020-08-21 NOTE — SUBJECTIVE & OBJECTIVE
Interval History: Mr Chavez is currently cooperating with staff without agitation.     Review of Systems   Unable to perform ROS: Dementia     Objective:     Vital Signs (Most Recent):  Temp: 98.1 °F (36.7 °C) (08/21/20 1650)  Pulse: 65 (08/21/20 1650)  Resp: 18 (08/21/20 1650)  BP: 136/84 (08/21/20 1650)  SpO2: 96 % (08/21/20 1650) Vital Signs (24h Range):  Temp:  [97.4 °F (36.3 °C)-99 °F (37.2 °C)] 98.1 °F (36.7 °C)  Pulse:  [] 65  Resp:  [17-20] 18  SpO2:  [93 %-98 %] 96 %  BP: (120-159)/(67-95) 136/84     Weight: 74.8 kg (165 lb)  Body mass index is 25.09 kg/m².  No intake or output data in the 24 hours ending 08/21/20 1807   Physical Exam  Vitals signs and nursing note reviewed.   Constitutional:       General: He is not in acute distress.     Appearance: Normal appearance. He is not ill-appearing.   HENT:      Head: Normocephalic and atraumatic.      Nose: Nose normal.      Mouth/Throat:      Mouth: Mucous membranes are moist.   Eyes:      Extraocular Movements: Extraocular movements intact.      Pupils: Pupils are equal, round, and reactive to light.   Neck:      Musculoskeletal: Normal range of motion and neck supple.   Cardiovascular:      Rate and Rhythm: Normal rate and regular rhythm.   Pulmonary:      Effort: Pulmonary effort is normal.      Breath sounds: Normal breath sounds.   Abdominal:      General: Bowel sounds are normal. There is no distension.      Palpations: Abdomen is soft.      Tenderness: There is no abdominal tenderness.   Musculoskeletal: Normal range of motion.   Skin:     General: Skin is warm.   Neurological:      Mental Status: He is alert.      Comments: Pt could not cooperate with the exam   Psychiatric:      Comments: Pt could not cooperate with the exam  Calm         Significant Labs:   Recent Lab Results       08/21/20  0600   08/21/20  0559        Albumin   2.5     Alkaline Phosphatase   45     ALT   8     Anion Gap   7     AST   24     Baso # 0.01       Basophil% 0.2        BILIRUBIN TOTAL   0.8  Comment:  For infants and newborns, interpretation of results should be based  on gestational age, weight and in agreement with clinical  observations.  Premature Infant recommended reference ranges:  Up to 24 hours.............<8.0 mg/dL  Up to 48 hours............<12.0 mg/dL  3-5 days..................<15.0 mg/dL  6-29 days.................<15.0 mg/dL       BUN, Bld   34     Calcium   7.9     Chloride   108     CO2   25     Creatinine   1.5     Differential Method Automated       eGFR if African American   49.8     eGFR if non    43.0  Comment:  Calculation used to obtain the estimated glomerular filtration  rate (eGFR) is the CKD-EPI equation.        Eos # 0.3       Eosinophil% 6.7       Glucose   98     Gran # (ANC) 2.9       Gran% 58.6       Hematocrit 22.9       Hemoglobin 7.5       Immature Grans (Abs) 0.02  Comment:  Mild elevation in immature granulocytes is non specific and   can be seen in a variety of conditions including stress response,   acute inflammation, trauma and pregnancy. Correlation with other   laboratory and clinical findings is essential.         Immature Granulocytes 0.4       INR 1.3  Comment:  Coumadin Therapy:  INR: 2.0-3.0 conventional anticoagulation  INR: 2.5-3.5 intensive anticoagulation         Lymph # 1.4       Lymph% 28.0       Magnesium   2.2     MCH 33.9       MCHC 32.8              Mono # 0.3       Mono% 6.1       MPV 10.4       nRBC 0       Platelets 195       Potassium   3.5     PROTEIN TOTAL   5.3     PT 16.0       RBC 2.21       RDW 13.9       Sodium   140     WBC 4.93             Significant Imaging: I have reviewed all pertinent imaging results/findings within the past 24 hours.

## 2020-08-21 NOTE — PT/OT/SLP PROGRESS
Physical Therapy Treatment    Patient Name:  Tomasz Chavez   MRN:  [id number]    Recommendations:     Discharge Recommendations:  nursing facility, skilled   Discharge Equipment Recommendations: walker, rolling   Barriers to discharge: patient still presents with decreased mobility so would benefit from admission to facility for rehab prior to DC home alone    Assessment:     Tomasz Chavez is a 79 y.o. male admitted with a medical diagnosis of Closed displaced fracture of left femoral neck.  He presents with the following impairments/functional limitations:  weakness, impaired endurance, impaired functional mobilty, gait instability, impaired balance, decreased lower extremity function, pain, decreased safety awareness, decreased ROM, orthopedic precautions, impaired cognition .  Patient agreeable to PT treatment to include gait training but expressed he was having some pain in his hip with gait.  Patient presented supine in bed and required max assist to transfer to sitting and mod assist to stand. Patient then able to ambulate 2 x 100 feet with RW with min assist to advance walker some of the time.    Rehab Prognosis: Good; patient would benefit from acute skilled PT services to address these deficits and reach maximum level of function.    Recent Surgery: Procedure(s) (LRB):  HEMIARTHROPLASTY, HIP (Left) 5 Days Post-Op    Plan:     During this hospitalization, patient to be seen daily to address the identified rehab impairments via gait training, therapeutic activities, therapeutic exercises and progress toward the following goals:    · Plan of Care Expires:  09/15/20    Subjective     Chief Complaint: pain  Patient/Family Comments/goals: none given  Pain/Comfort:  ·        Objective:     Communicated with nurse prior to session.  Patient found supine with bed alarm upon PT entry to room.     General Precautions: Standard, fall   Orthopedic Precautions:LLE weight bearing as tolerated   Braces:       Functional  Mobility:  · Bed Mobility:     · Supine to Sit: maximal assistance  · Sit to Supine: maximal assistance  · Transfers:     · Sit to Stand:  moderate assistance with rolling walker  · Gait: 2 x 100 feet with RW with min assist      AM-PAC 6 CLICK MOBILITY          Therapeutic Activities and Exercises:   transfers training supine to/from sit with max assist, sit to stand x 3 times with RW with mod assist  Gait training 2 x 100 feet with RW with min assist to advance RW    Patient left supine with call button in reach, bed alarm on and nurse notified..    GOALS:   Multidisciplinary Problems     Physical Therapy Goals        Problem: Physical Therapy Goal    Goal Priority Disciplines Outcome Goal Variances Interventions   Physical Therapy Goal     PT, PT/OT Ongoing, Progressing     Description: Goals to be met by: 9/15/20     Patient will increase functional independence with mobility by performin. Supine to sit with Stand-by Assistance  2. Sit to stand transfer with Minimal Assistance  3. Gait  x 150 feet with Minimal Assistance using Rolling Walker.   4. Lower extremity exercise program x20-30 reps per handout, with supervision                     Time Tracking:     PT Received On: 20  PT Start Time: 0840     PT Stop Time: 09  PT Total Time (min): 23 min     Billable Minutes: Gait Training 15 and Therapeutic Activity 8    Treatment Type: Treatment  PT/PTA: PT     PTA Visit Number: 0     Chris MeGilligan, PT  2020

## 2020-08-22 LAB
ALBUMIN SERPL BCP-MCNC: 2.5 G/DL (ref 3.5–5.2)
ALP SERPL-CCNC: 45 U/L (ref 55–135)
ALT SERPL W/O P-5'-P-CCNC: 10 U/L (ref 10–44)
ANION GAP SERPL CALC-SCNC: 8 MMOL/L (ref 8–16)
AST SERPL-CCNC: 23 U/L (ref 10–40)
BASOPHILS # BLD AUTO: 0.01 K/UL (ref 0–0.2)
BASOPHILS NFR BLD: 0.2 % (ref 0–1.9)
BILIRUB SERPL-MCNC: 0.8 MG/DL (ref 0.1–1)
BUN SERPL-MCNC: 30 MG/DL (ref 8–23)
CALCIUM SERPL-MCNC: 8 MG/DL (ref 8.7–10.5)
CHLORIDE SERPL-SCNC: 106 MMOL/L (ref 95–110)
CO2 SERPL-SCNC: 25 MMOL/L (ref 23–29)
CREAT SERPL-MCNC: 1.3 MG/DL (ref 0.5–1.4)
DIFFERENTIAL METHOD: ABNORMAL
EOSINOPHIL # BLD AUTO: 0.2 K/UL (ref 0–0.5)
EOSINOPHIL NFR BLD: 5.1 % (ref 0–8)
ERYTHROCYTE [DISTWIDTH] IN BLOOD BY AUTOMATED COUNT: 13.9 % (ref 11.5–14.5)
EST. GFR  (AFRICAN AMERICAN): 59.2 ML/MIN/1.73 M^2
EST. GFR  (NON AFRICAN AMERICAN): 51.2 ML/MIN/1.73 M^2
GLUCOSE SERPL-MCNC: 151 MG/DL (ref 70–110)
GLUCOSE SERPL-MCNC: 92 MG/DL (ref 70–110)
HCT VFR BLD AUTO: 22.5 % (ref 40–54)
HGB BLD-MCNC: 7.3 G/DL (ref 14–18)
IMM GRANULOCYTES # BLD AUTO: 0.01 K/UL (ref 0–0.04)
IMM GRANULOCYTES NFR BLD AUTO: 0.2 % (ref 0–0.5)
INR PPP: 1.2
LYMPHOCYTES # BLD AUTO: 1.1 K/UL (ref 1–4.8)
LYMPHOCYTES NFR BLD: 25.5 % (ref 18–48)
MAGNESIUM SERPL-MCNC: 2.1 MG/DL (ref 1.6–2.6)
MCH RBC QN AUTO: 33.5 PG (ref 27–31)
MCHC RBC AUTO-ENTMCNC: 32.4 G/DL (ref 32–36)
MCV RBC AUTO: 103 FL (ref 82–98)
MONOCYTES # BLD AUTO: 0.3 K/UL (ref 0.3–1)
MONOCYTES NFR BLD: 7.2 % (ref 4–15)
NEUTROPHILS # BLD AUTO: 2.7 K/UL (ref 1.8–7.7)
NEUTROPHILS NFR BLD: 61.8 % (ref 38–73)
NRBC BLD-RTO: 0 /100 WBC
PLATELET # BLD AUTO: 215 K/UL (ref 150–350)
PMV BLD AUTO: 10.2 FL (ref 9.2–12.9)
POTASSIUM SERPL-SCNC: 3.4 MMOL/L (ref 3.5–5.1)
PROT SERPL-MCNC: 5.5 G/DL (ref 6–8.4)
PROTHROMBIN TIME: 15 SEC (ref 10.6–14.8)
RBC # BLD AUTO: 2.18 M/UL (ref 4.6–6.2)
SODIUM SERPL-SCNC: 139 MMOL/L (ref 136–145)
TB INDURATION 48 - 72 HR READ: 0 MM
WBC # BLD AUTO: 4.32 K/UL (ref 3.9–12.7)

## 2020-08-22 PROCEDURE — 25000003 PHARM REV CODE 250: Performed by: INTERNAL MEDICINE

## 2020-08-22 PROCEDURE — 85025 COMPLETE CBC W/AUTO DIFF WBC: CPT

## 2020-08-22 PROCEDURE — 25000003 PHARM REV CODE 250: Performed by: ORTHOPAEDIC SURGERY

## 2020-08-22 PROCEDURE — 80053 COMPREHEN METABOLIC PANEL: CPT

## 2020-08-22 PROCEDURE — 97116 GAIT TRAINING THERAPY: CPT | Mod: CQ

## 2020-08-22 PROCEDURE — 85610 PROTHROMBIN TIME: CPT

## 2020-08-22 PROCEDURE — 12000002 HC ACUTE/MED SURGE SEMI-PRIVATE ROOM

## 2020-08-22 PROCEDURE — 36415 COLL VENOUS BLD VENIPUNCTURE: CPT

## 2020-08-22 PROCEDURE — 97530 THERAPEUTIC ACTIVITIES: CPT | Mod: CQ

## 2020-08-22 PROCEDURE — 83735 ASSAY OF MAGNESIUM: CPT

## 2020-08-22 RX ORDER — HYDROCODONE BITARTRATE AND ACETAMINOPHEN 5; 325 MG/1; MG/1
1 TABLET ORAL ONCE
Status: COMPLETED | OUTPATIENT
Start: 2020-08-22 | End: 2020-08-22

## 2020-08-22 RX ADMIN — HYDROCODONE BITARTRATE AND ACETAMINOPHEN 1 TABLET: 10; 325 TABLET ORAL at 09:08

## 2020-08-22 RX ADMIN — HYDRALAZINE HYDROCHLORIDE 25 MG: 25 TABLET, FILM COATED ORAL at 08:08

## 2020-08-22 RX ADMIN — LACTULOSE 20 G: 20 SOLUTION ORAL at 02:08

## 2020-08-22 RX ADMIN — HYDRALAZINE HYDROCHLORIDE 25 MG: 25 TABLET, FILM COATED ORAL at 05:08

## 2020-08-22 RX ADMIN — HYDRALAZINE HYDROCHLORIDE 25 MG: 25 TABLET, FILM COATED ORAL at 01:08

## 2020-08-22 RX ADMIN — HYDROCODONE BITARTRATE AND ACETAMINOPHEN 1 TABLET: 10; 325 TABLET ORAL at 05:08

## 2020-08-22 RX ADMIN — HALOPERIDOL 5 MG: 5 TABLET ORAL at 08:08

## 2020-08-22 RX ADMIN — POTASSIUM CHLORIDE 40 MEQ: 1.5 POWDER, FOR SOLUTION ORAL at 08:08

## 2020-08-22 RX ADMIN — ISOSORBIDE MONONITRATE 60 MG: 60 TABLET, EXTENDED RELEASE ORAL at 08:08

## 2020-08-22 RX ADMIN — HYDROCODONE BITARTRATE AND ACETAMINOPHEN 1 TABLET: 5; 325 TABLET ORAL at 08:08

## 2020-08-22 RX ADMIN — LACTULOSE 20 G: 20 SOLUTION ORAL at 08:08

## 2020-08-22 RX ADMIN — POTASSIUM CHLORIDE 40 MEQ: 1.5 POWDER, FOR SOLUTION ORAL at 12:08

## 2020-08-22 RX ADMIN — ATORVASTATIN CALCIUM 20 MG: 20 TABLET, FILM COATED ORAL at 08:08

## 2020-08-22 NOTE — NURSING
Pt sitting up in chair, started gurgling, unresponsive, Rapid called, Pt vitals WNL', , sternal rub, pt woke up , drowsy, placed pt back in bed, bed rails up, sitter in room, bed alarm on.   Pt resting comfortably.

## 2020-08-22 NOTE — SUBJECTIVE & OBJECTIVE
Interval History: Mr Chavez had an episode of somnolence post pain medication from which he recovered. Otherwise there has been no change in status and we are awaiting SNF placement    Review of Systems   Unable to perform ROS: Dementia     Objective:     Vital Signs (Most Recent):  Temp: 98.3 °F (36.8 °C) (08/22/20 0740)  Pulse: 82 (08/22/20 1131)  Resp: 12 (08/22/20 1131)  BP: 134/84 (08/22/20 1342)  SpO2: 100 % (08/22/20 1131) Vital Signs (24h Range):  Temp:  [98.1 °F (36.7 °C)-99.3 °F (37.4 °C)] 98.3 °F (36.8 °C)  Pulse:  [65-94] 82  Resp:  [12-18] 12  SpO2:  [94 %-100 %] 100 %  BP: (128-159)/(76-84) 134/84     Weight: 74.8 kg (165 lb)  Body mass index is 25.09 kg/m².    Intake/Output Summary (Last 24 hours) at 8/22/2020 1400  Last data filed at 8/22/2020 0402  Gross per 24 hour   Intake --   Output 200 ml   Net -200 ml      Physical Exam  Vitals signs and nursing note reviewed.   Constitutional:       General: He is not in acute distress.     Appearance: Normal appearance. He is not ill-appearing.   HENT:      Head: Normocephalic and atraumatic.      Nose: Nose normal.      Mouth/Throat:      Mouth: Mucous membranes are moist.   Eyes:      Pupils: Pupils are equal, round, and reactive to light.   Neck:      Musculoskeletal: Neck supple.   Cardiovascular:      Rate and Rhythm: Normal rate and regular rhythm.   Pulmonary:      Effort: Pulmonary effort is normal.      Breath sounds: Normal breath sounds.   Abdominal:      General: Bowel sounds are normal. There is no distension.      Tenderness: There is no abdominal tenderness.   Musculoskeletal:      Comments: Pt moves all extremities  Pt cannot cooperate with the exam   Skin:     General: Skin is warm.   Neurological:      Mental Status: He is alert.      Comments: Pt cannot cooperate with the exam   Psychiatric:      Comments: Pt cannot cooperate with the exam         Significant Labs:   Recent Lab Results       08/22/20  1059   08/22/20  0550   08/22/20  0518         TB Induration 48 - 72 hr read     0     Albumin   2.5       Alkaline Phosphatase   45       ALT   10       Anion Gap   8       AST   23       Baso #   0.01       Basophil%   0.2       BILIRUBIN TOTAL   0.8  Comment:  For infants and newborns, interpretation of results should be based  on gestational age, weight and in agreement with clinical  observations.  Premature Infant recommended reference ranges:  Up to 24 hours.............<8.0 mg/dL  Up to 48 hours............<12.0 mg/dL  3-5 days..................<15.0 mg/dL  6-29 days.................<15.0 mg/dL         BUN, Bld   30       Calcium   8.0       Chloride   106       CO2   25       Creatinine   1.3       Differential Method   Automated       eGFR if    59.2       eGFR if non    51.2  Comment:  Calculation used to obtain the estimated glomerular filtration  rate (eGFR) is the CKD-EPI equation.          Eos #   0.2       Eosinophil%   5.1       Glucose   92       Gran # (ANC)   2.7       Gran%   61.8       Hematocrit   22.5       Hemoglobin   7.3       Immature Grans (Abs)   0.01  Comment:  Mild elevation in immature granulocytes is non specific and   can be seen in a variety of conditions including stress response,   acute inflammation, trauma and pregnancy. Correlation with other   laboratory and clinical findings is essential.         Immature Granulocytes   0.2       INR   1.2  Comment:  Coumadin Therapy:  INR: 2.0-3.0 conventional anticoagulation  INR: 2.5-3.5 intensive anticoagulation         Lymph #   1.1       Lymph%   25.5       Magnesium   2.1       MCH   33.5       MCHC   32.4       MCV   103       Mono #   0.3       Mono%   7.2       MPV   10.2       nRBC   0       Platelets   215       POC Glucose 151         Potassium   3.4       PROTEIN TOTAL   5.5       PT   15.0       RBC   2.18       RDW   13.9       Sodium   139       WBC   4.32             Significant Imaging: I have reviewed all pertinent imaging  results/findings within the past 24 hours.

## 2020-08-22 NOTE — PT/OT/SLP PROGRESS
Occupational Therapy      Patient Name:  Tomasz Chavez   MRN:  [id number]    Patient not seen today secondary to (Rapid response called.). Will follow-up later date.    Briseida Hodge OT  8/22/2020

## 2020-08-22 NOTE — PT/OT/SLP PROGRESS
Physical Therapy Treatment    Patient Name:  Tomasz Chavez   MRN:  [id number]    Recommendations:     Discharge Recommendations:  nursing facility, skilled   Discharge Equipment Recommendations: walker, rolling   Barriers to discharge: Decreased caregiver support    Assessment:     Tomasz Chavez is a 79 y.o. male admitted with a medical diagnosis of Closed displaced fracture of left femoral neck.  He presents with the following impairments/functional limitations:  weakness, impaired endurance, impaired self care skills, impaired functional mobilty, impaired balance, gait instability, decreased lower extremity function, decreased safety awareness, pain, orthopedic precautions. Pt agreeable to tx. Pt ambulated requiring min A and verbal and tactile cuing for walker management. Pt required verbal cuing to stay close to walker throughout tx 2/2 Pt unable to maintain correction. Pt ambulated with decreased stance time on LLE, decreased adonay, forward flexed posture, and required increased time to advance LLE. Pt without LOB during gait training. Pt required increased time and assistance to complete turns.  Continue with PT and POC.     Rehab Prognosis: Good; patient would benefit from acute skilled PT services to address these deficits and reach maximum level of function.    Recent Surgery: Procedure(s) (LRB):  HEMIARTHROPLASTY, HIP (Left) 6 Days Post-Op    Plan:     During this hospitalization, patient to be seen 6 x/week to address the identified rehab impairments via gait training, therapeutic activities, therapeutic exercises and progress toward the following goals:    · Plan of Care Expires:  09/15/20    Subjective     Chief Complaint: Pt reports L hip pain with gait training.  Patient/Family Comments/goals: return home   Pain/Comfort:  · Pain Rating 1: (not rated)  · Location - Side 1: Left  · Location 1: hip  · Pain Addressed 1: Reposition, Cessation of Activity, Distraction      Objective:     Communicated  with RN prior to session.  Patient found HOB elevated with bed alarm and RN student present upon PT entry to room.     General Precautions: Standard, fall   Orthopedic Precautions:LLE weight bearing as tolerated   Braces:       Functional Mobility:  · Bed Mobility:     · Supine to Sit: maximal assistance  · Transfers:     · Sit to Stand:  contact guard assistance with rolling walker and 2 trials  · Bed to Chair: minimum assistance with  rolling walker  using  Step Transfer  · Gait: 40 feet with RW and min A x 2 trials.       AM-PAC 6 CLICK MOBILITY          Therapeutic Activities and Exercises:   bed mobility; sitting EOB for trunk control and midline orientation; sit<> stands; transfer training; gait training     Patient left up in chair with all lines intact, call button in reach, chair alarm on and RN notified..    GOALS:   Multidisciplinary Problems     Physical Therapy Goals        Problem: Physical Therapy Goal    Goal Priority Disciplines Outcome Goal Variances Interventions   Physical Therapy Goal     PT, PT/OT Ongoing, Progressing     Description: Goals to be met by: 9/15/20     Patient will increase functional independence with mobility by performin. Supine to sit with Stand-by Assistance  2. Sit to stand transfer with Minimal Assistance  3. Gait  x 150 feet with Minimal Assistance using Rolling Walker.   4. Lower extremity exercise program x20-30 reps per handout, with supervision                     Time Tracking:     PT Received On: 20  PT Start Time: 852     PT Stop Time: 915  PT Total Time (min): 23 min     Billable Minutes: Gait Training 13 and Therapeutic Activity 10    Treatment Type: Treatment  PT/PTA: PTA     PTA Visit Number: Farida     Hillary Hammant, PTA  2020

## 2020-08-22 NOTE — PROGRESS NOTES
[facility] Medicine  Progress Note    Patient Name: Tomasz Chavez  MRN: [id number]  Patient Class: IP- Inpatient   Admission Date: 8/15/2020  Length of Stay: 7 days  Attending Physician: Otilio Amador MD  Primary Care Provider: Kendrick Hatfield MD        Subjective:     Principal Problem:Closed displaced fracture of left femoral neck        HPI:  79 year old patient admitted with  Acute subcapital fracture of left femoral neck, with mild impaction  Patient was walking around today and had a fall  Later he felt severe pain on L hip area and was unable to move LLE  Patient described pain as severe/constant/with no radiation  Also he was unable to support his weight       Overview/Hospital Course:  08/16  Pt today had Left hip hemiarthroplasty  No other issues  BP well controlled    08/17  Pt had confused episodes yesterday night  Sitter near bedside    8/18/2020  Pt denies any pain and is progressing with PT. Interaction limited by dementia.    8/19/2020  Pt had PM encephalopathy that has resolved. He states that he has no pain, no problems.    8/20/2020  Mr Chavez is cooperative and working with PT this AM. No agitation    8/21/2020  Mr Chavez has had no agitation. He is cooperating with PT and staff.    8/22/2020  Pt had an episode of somnolence S/P a dose of pain medication. The patient recovered in a very short period of time and his pain medication was stopped for the present time    Interval History: Mr Chavez had an episode of somnolence post pain medication from which he recovered. Otherwise there has been no change in status and we are awaiting SNF placement    Review of Systems   Unable to perform ROS: Dementia     Objective:     Vital Signs (Most Recent):  Temp: 98.3 °F (36.8 °C) (08/22/20 0740)  Pulse: 82 (08/22/20 1131)  Resp: 12 (08/22/20 1131)  BP: 134/84 (08/22/20 1342)  SpO2: 100 % (08/22/20 1131) Vital Signs (24h Range):  Temp:  [98.1 °F (36.7 °C)-99.3 °F (37.4 °C)] 98.3 °F  (36.8 °C)  Pulse:  [65-94] 82  Resp:  [12-18] 12  SpO2:  [94 %-100 %] 100 %  BP: (128-159)/(76-84) 134/84     Weight: 74.8 kg (165 lb)  Body mass index is 25.09 kg/m².    Intake/Output Summary (Last 24 hours) at 8/22/2020 1400  Last data filed at 8/22/2020 0402  Gross per 24 hour   Intake --   Output 200 ml   Net -200 ml      Physical Exam  Vitals signs and nursing note reviewed.   Constitutional:       General: He is not in acute distress.     Appearance: Normal appearance. He is not ill-appearing.   HENT:      Head: Normocephalic and atraumatic.      Nose: Nose normal.      Mouth/Throat:      Mouth: Mucous membranes are moist.   Eyes:      Pupils: Pupils are equal, round, and reactive to light.   Neck:      Musculoskeletal: Neck supple.   Cardiovascular:      Rate and Rhythm: Normal rate and regular rhythm.   Pulmonary:      Effort: Pulmonary effort is normal.      Breath sounds: Normal breath sounds.   Abdominal:      General: Bowel sounds are normal. There is no distension.      Tenderness: There is no abdominal tenderness.   Musculoskeletal:      Comments: Pt moves all extremities  Pt cannot cooperate with the exam   Skin:     General: Skin is warm.   Neurological:      Mental Status: He is alert.      Comments: Pt cannot cooperate with the exam   Psychiatric:      Comments: Pt cannot cooperate with the exam         Significant Labs:   Recent Lab Results       08/22/20  1059   08/22/20  0550   08/22/20  0518        TB Induration 48 - 72 hr read     0     Albumin   2.5       Alkaline Phosphatase   45       ALT   10       Anion Gap   8       AST   23       Baso #   0.01       Basophil%   0.2       BILIRUBIN TOTAL   0.8  Comment:  For infants and newborns, interpretation of results should be based  on gestational age, weight and in agreement with clinical  observations.  Premature Infant recommended reference ranges:  Up to 24 hours.............<8.0 mg/dL  Up to 48 hours............<12.0 mg/dL  3-5  days..................<15.0 mg/dL  6-29 days.................<15.0 mg/dL         BUN, Bld   30       Calcium   8.0       Chloride   106       CO2   25       Creatinine   1.3       Differential Method   Automated       eGFR if    59.2       eGFR if non    51.2  Comment:  Calculation used to obtain the estimated glomerular filtration  rate (eGFR) is the CKD-EPI equation.          Eos #   0.2       Eosinophil%   5.1       Glucose   92       Gran # (ANC)   2.7       Gran%   61.8       Hematocrit   22.5       Hemoglobin   7.3       Immature Grans (Abs)   0.01  Comment:  Mild elevation in immature granulocytes is non specific and   can be seen in a variety of conditions including stress response,   acute inflammation, trauma and pregnancy. Correlation with other   laboratory and clinical findings is essential.         Immature Granulocytes   0.2       INR   1.2  Comment:  Coumadin Therapy:  INR: 2.0-3.0 conventional anticoagulation  INR: 2.5-3.5 intensive anticoagulation         Lymph #   1.1       Lymph%   25.5       Magnesium   2.1       MCH   33.5       MCHC   32.4       MCV   103       Mono #   0.3       Mono%   7.2       MPV   10.2       nRBC   0       Platelets   215       POC Glucose 151         Potassium   3.4       PROTEIN TOTAL   5.5       PT   15.0       RBC   2.18       RDW   13.9       Sodium   139       WBC   4.32             Significant Imaging: I have reviewed all pertinent imaging results/findings within the past 24 hours.      Assessment/Plan:   8/22/2020  Pt had a brief period of somnolence post pain medication from which he recovered  A)  Metabolic Encephalopathy resolved   Dementia with no behavioral disturbance at present  Anemia with Hb of 7-Pt has no physiologic signs of anemia and I do not believe transfusion of RBC's will improve his status  P)  D/C pain medications at present  Seeking SNF placement      * Closed displaced fracture of left femoral neck  S/p  Left  hip hemiarthroplasty POD#1  PT/OT recommended SNF  SW Consulted    Anticoagulated  No idea why pt is on Eliquis at home      BRITT (acute kidney injury)  BRITT per today's labs  D/C all Nephrotoxins(HCTZ/ACE Inh/Lasix)      HTN (hypertension)  Continue present Tx  Patient need prescription for hydralazine when he goes home  Seems Hydralazine is the only med which finally brought his BP down    DNR (do not resuscitate)  DNR      Dementia  Stable issue      CKD (chronic kidney disease), stage III  Chronic stable issue        VTE Risk Mitigation (From admission, onward)         Ordered     enoxaparin injection 40 mg  Every 24 hours (non-standard times)      08/17/20 0858     IP VTE HIGH RISK PATIENT  Once      08/16/20 2128     Place ADONIS hose  Until discontinued      08/16/20 2128     Place sequential compression device  Until discontinued      08/16/20 2128                Discharge Planning   RACHEL:      Code Status: DNR   Is the patient medically ready for discharge?:     Reason for patient still in hospital (select all that apply): Patient new problem and Other (specify) Inability to place the patient in SNF due to prior behaviors  Discharge Plan A: Skilled Nursing Facility                  Otilio Amador MD  Department of Hospital Medicine   [facility]

## 2020-08-23 LAB
ALBUMIN SERPL BCP-MCNC: 2.9 G/DL (ref 3.5–5.2)
ALP SERPL-CCNC: 55 U/L (ref 55–135)
ALT SERPL W/O P-5'-P-CCNC: 11 U/L (ref 10–44)
ANION GAP SERPL CALC-SCNC: 9 MMOL/L (ref 8–16)
AST SERPL-CCNC: 23 U/L (ref 10–40)
BASOPHILS # BLD AUTO: 0.01 K/UL (ref 0–0.2)
BASOPHILS NFR BLD: 0.1 % (ref 0–1.9)
BILIRUB SERPL-MCNC: 1 MG/DL (ref 0.1–1)
BUN SERPL-MCNC: 30 MG/DL (ref 8–23)
CALCIUM SERPL-MCNC: 8.3 MG/DL (ref 8.7–10.5)
CHLORIDE SERPL-SCNC: 106 MMOL/L (ref 95–110)
CO2 SERPL-SCNC: 24 MMOL/L (ref 23–29)
CREAT SERPL-MCNC: 1.2 MG/DL (ref 0.5–1.4)
DIFFERENTIAL METHOD: ABNORMAL
EOSINOPHIL # BLD AUTO: 0.3 K/UL (ref 0–0.5)
EOSINOPHIL NFR BLD: 3.9 % (ref 0–8)
ERYTHROCYTE [DISTWIDTH] IN BLOOD BY AUTOMATED COUNT: 13.8 % (ref 11.5–14.5)
EST. GFR  (AFRICAN AMERICAN): >60 ML/MIN/1.73 M^2
EST. GFR  (NON AFRICAN AMERICAN): 56.4 ML/MIN/1.73 M^2
GLUCOSE SERPL-MCNC: 105 MG/DL (ref 70–110)
HCT VFR BLD AUTO: 26.3 % (ref 40–54)
HGB BLD-MCNC: 8.4 G/DL (ref 14–18)
IMM GRANULOCYTES # BLD AUTO: 0.03 K/UL (ref 0–0.04)
IMM GRANULOCYTES NFR BLD AUTO: 0.4 % (ref 0–0.5)
INR PPP: 1.2
LYMPHOCYTES # BLD AUTO: 1 K/UL (ref 1–4.8)
LYMPHOCYTES NFR BLD: 14.9 % (ref 18–48)
MAGNESIUM SERPL-MCNC: 2.3 MG/DL (ref 1.6–2.6)
MCH RBC QN AUTO: 34.1 PG (ref 27–31)
MCHC RBC AUTO-ENTMCNC: 31.9 G/DL (ref 32–36)
MCV RBC AUTO: 107 FL (ref 82–98)
MONOCYTES # BLD AUTO: 0.4 K/UL (ref 0.3–1)
MONOCYTES NFR BLD: 6 % (ref 4–15)
NEUTROPHILS # BLD AUTO: 5.2 K/UL (ref 1.8–7.7)
NEUTROPHILS NFR BLD: 74.7 % (ref 38–73)
NRBC BLD-RTO: 0 /100 WBC
PLATELET # BLD AUTO: 285 K/UL (ref 150–350)
PMV BLD AUTO: 10 FL (ref 9.2–12.9)
POTASSIUM SERPL-SCNC: 3.9 MMOL/L (ref 3.5–5.1)
PROT SERPL-MCNC: 6 G/DL (ref 6–8.4)
PROTHROMBIN TIME: 14.5 SEC (ref 10.6–14.8)
RBC # BLD AUTO: 2.46 M/UL (ref 4.6–6.2)
SODIUM SERPL-SCNC: 139 MMOL/L (ref 136–145)
WBC # BLD AUTO: 6.96 K/UL (ref 3.9–12.7)

## 2020-08-23 PROCEDURE — 83735 ASSAY OF MAGNESIUM: CPT

## 2020-08-23 PROCEDURE — 85025 COMPLETE CBC W/AUTO DIFF WBC: CPT

## 2020-08-23 PROCEDURE — 63600175 PHARM REV CODE 636 W HCPCS: Performed by: INTERNAL MEDICINE

## 2020-08-23 PROCEDURE — 25000003 PHARM REV CODE 250: Performed by: INTERNAL MEDICINE

## 2020-08-23 PROCEDURE — 85610 PROTHROMBIN TIME: CPT

## 2020-08-23 PROCEDURE — 36415 COLL VENOUS BLD VENIPUNCTURE: CPT

## 2020-08-23 PROCEDURE — 80053 COMPREHEN METABOLIC PANEL: CPT

## 2020-08-23 PROCEDURE — 12000002 HC ACUTE/MED SURGE SEMI-PRIVATE ROOM

## 2020-08-23 RX ORDER — LORAZEPAM 2 MG/ML
1 INJECTION INTRAMUSCULAR ONCE
Status: COMPLETED | OUTPATIENT
Start: 2020-08-24 | End: 2020-08-24

## 2020-08-23 RX ORDER — LORAZEPAM 2 MG/ML
1 INJECTION INTRAMUSCULAR ONCE
Status: COMPLETED | OUTPATIENT
Start: 2020-08-23 | End: 2020-08-23

## 2020-08-23 RX ORDER — TRAMADOL HYDROCHLORIDE 50 MG/1
50 TABLET ORAL EVERY 6 HOURS PRN
Status: DISCONTINUED | OUTPATIENT
Start: 2020-08-23 | End: 2020-08-26 | Stop reason: HOSPADM

## 2020-08-23 RX ADMIN — ISOSORBIDE MONONITRATE 60 MG: 60 TABLET, EXTENDED RELEASE ORAL at 08:08

## 2020-08-23 RX ADMIN — LORAZEPAM 1 MG: 2 INJECTION INTRAMUSCULAR; INTRAVENOUS at 06:08

## 2020-08-23 RX ADMIN — HYDRALAZINE HYDROCHLORIDE 25 MG: 25 TABLET, FILM COATED ORAL at 09:08

## 2020-08-23 RX ADMIN — HYDRALAZINE HYDROCHLORIDE 25 MG: 25 TABLET, FILM COATED ORAL at 05:08

## 2020-08-23 RX ADMIN — ENOXAPARIN SODIUM 40 MG: 100 INJECTION SUBCUTANEOUS at 05:08

## 2020-08-23 RX ADMIN — HALOPERIDOL 5 MG: 5 TABLET ORAL at 08:08

## 2020-08-23 RX ADMIN — LACTULOSE 20 G: 20 SOLUTION ORAL at 08:08

## 2020-08-23 RX ADMIN — LACTULOSE 20 G: 20 SOLUTION ORAL at 05:08

## 2020-08-23 RX ADMIN — ATORVASTATIN CALCIUM 20 MG: 20 TABLET, FILM COATED ORAL at 08:08

## 2020-08-23 RX ADMIN — TRAMADOL HYDROCHLORIDE 50 MG: 50 TABLET, FILM COATED ORAL at 01:08

## 2020-08-23 RX ADMIN — HYDRALAZINE HYDROCHLORIDE 25 MG: 25 TABLET, FILM COATED ORAL at 01:08

## 2020-08-23 NOTE — SUBJECTIVE & OBJECTIVE
Interval History: Mr Chavez had evidence of post op pain that was relieved with tramadol    Review of Systems   Unable to perform ROS: Dementia     Objective:     Vital Signs (Most Recent):  Temp: 97.5 °F (36.4 °C) (08/23/20 1125)  Pulse: 79 (08/23/20 1125)  Resp: 20 (08/23/20 1125)  BP: (!) 159/90 (08/23/20 1125)  SpO2: 99 % (08/23/20 1125) Vital Signs (24h Range):  Temp:  [97.4 °F (36.3 °C)-97.9 °F (36.6 °C)] 97.5 °F (36.4 °C)  Pulse:  [79-88] 79  Resp:  [16-20] 20  SpO2:  [95 %-99 %] 99 %  BP: (134-159)/(82-98) 159/90     Weight: 74.8 kg (165 lb)  Body mass index is 25.09 kg/m².    Intake/Output Summary (Last 24 hours) at 8/23/2020 1324  Last data filed at 8/23/2020 0800  Gross per 24 hour   Intake 240 ml   Output --   Net 240 ml      Physical Exam  Vitals signs and nursing note reviewed.   Constitutional:       Appearance: Normal appearance.      Comments: 0/10 painful faces at present post pain medication   HENT:      Head: Normocephalic and atraumatic.      Nose: Nose normal.      Mouth/Throat:      Mouth: Mucous membranes are moist.   Eyes:      Extraocular Movements: Extraocular movements intact.      Pupils: Pupils are equal, round, and reactive to light.   Neck:      Musculoskeletal: Normal range of motion.   Cardiovascular:      Rate and Rhythm: Normal rate and regular rhythm.      Pulses: Normal pulses.   Pulmonary:      Effort: Pulmonary effort is normal.      Breath sounds: Normal breath sounds.   Abdominal:      General: Bowel sounds are normal. There is no distension.      Tenderness: There is no abdominal tenderness.   Musculoskeletal:      Comments: Unable to cooperate with the exam   Skin:     General: Skin is warm.   Neurological:      Mental Status: He is alert.      Comments: Unable to cooperate with the exam   Psychiatric:      Comments: Unable to cooperate with the exam         Significant Labs:   Recent Lab Results       08/23/20  0525        Albumin 2.9     Alkaline Phosphatase 55     ALT 11      Anion Gap 9     AST 23     Baso # 0.01     Basophil% 0.1     BILIRUBIN TOTAL 1.0  Comment:  For infants and newborns, interpretation of results should be based  on gestational age, weight and in agreement with clinical  observations.  Premature Infant recommended reference ranges:  Up to 24 hours.............<8.0 mg/dL  Up to 48 hours............<12.0 mg/dL  3-5 days..................<15.0 mg/dL  6-29 days.................<15.0 mg/dL       BUN, Bld 30     Calcium 8.3     Chloride 106     CO2 24     Creatinine 1.2     Differential Method Automated     eGFR if African American >60.0     eGFR if non  56.4  Comment:  Calculation used to obtain the estimated glomerular filtration  rate (eGFR) is the CKD-EPI equation.        Eos # 0.3     Eosinophil% 3.9     Glucose 105     Gran # (ANC) 5.2     Gran% 74.7     Hematocrit 26.3     Hemoglobin 8.4     Immature Grans (Abs) 0.03  Comment:  Mild elevation in immature granulocytes is non specific and   can be seen in a variety of conditions including stress response,   acute inflammation, trauma and pregnancy. Correlation with other   laboratory and clinical findings is essential.       Immature Granulocytes 0.4     INR 1.2  Comment:  Coumadin Therapy:  INR: 2.0-3.0 conventional anticoagulation  INR: 2.5-3.5 intensive anticoagulation       Lymph # 1.0     Lymph% 14.9     Magnesium 2.3     MCH 34.1     MCHC 31.9          Mono # 0.4     Mono% 6.0     MPV 10.0     nRBC 0     Platelets 285     Potassium 3.9     PROTEIN TOTAL 6.0     PT 14.5     RBC 2.46     RDW 13.8     Sodium 139     WBC 6.96           Significant Imaging: I have reviewed all pertinent imaging results/findings within the past 24 hours.

## 2020-08-23 NOTE — PROGRESS NOTES
[facility] Medicine  Progress Note    Patient Name: Tomasz Chavez  MRN: [id number]  Patient Class: IP- Inpatient   Admission Date: 8/15/2020  Length of Stay: 8 days  Attending Physician: Otilio Amador MD  Primary Care Provider: Kendrick Hatfield MD        Subjective:     Principal Problem:Closed displaced fracture of left femoral neck        HPI:  79 year old patient admitted with  Acute subcapital fracture of left femoral neck, with mild impaction  Patient was walking around today and had a fall  Later he felt severe pain on L hip area and was unable to move LLE  Patient described pain as severe/constant/with no radiation  Also he was unable to support his weight       Overview/Hospital Course:  08/16  Pt today had Left hip hemiarthroplasty  No other issues  BP well controlled    08/17  Pt had confused episodes yesterday night  Sitter near bedside    8/18/2020  Pt denies any pain and is progressing with PT. Interaction limited by dementia.    8/19/2020  Pt had PM encephalopathy that has resolved. He states that he has no pain, no problems.    8/20/2020  Mr Chavez is cooperative and working with PT this AM. No agitation    8/21/2020  Mr Chavez has had no agitation. He is cooperating with PT and staff.    8/22/2020  Pt had an episode of somnolence S/P a dose of pain medication. The patient recovered in a very short period of time and his pain medication was stopped for the present time    8/23/2020  Mr Riggins has noted pain in the post op extremity. Pt has an episode of encephalopathy and pain medications were discontinued. Pt had 6-8/10 painful face this date. He is unable to provide any more information.    Interval History: Mr Chavez had evidence of post op pain that was relieved with tramadol    Review of Systems   Unable to perform ROS: Dementia     Objective:     Vital Signs (Most Recent):  Temp: 97.5 °F (36.4 °C) (08/23/20 1125)  Pulse: 79 (08/23/20 1125)  Resp: 20 (08/23/20  1125)  BP: (!) 159/90 (08/23/20 1125)  SpO2: 99 % (08/23/20 1125) Vital Signs (24h Range):  Temp:  [97.4 °F (36.3 °C)-97.9 °F (36.6 °C)] 97.5 °F (36.4 °C)  Pulse:  [79-88] 79  Resp:  [16-20] 20  SpO2:  [95 %-99 %] 99 %  BP: (134-159)/(82-98) 159/90     Weight: 74.8 kg (165 lb)  Body mass index is 25.09 kg/m².    Intake/Output Summary (Last 24 hours) at 8/23/2020 1324  Last data filed at 8/23/2020 0800  Gross per 24 hour   Intake 240 ml   Output --   Net 240 ml      Physical Exam  Vitals signs and nursing note reviewed.   Constitutional:       Appearance: Normal appearance.      Comments: 0/10 painful faces at present post pain medication   HENT:      Head: Normocephalic and atraumatic.      Nose: Nose normal.      Mouth/Throat:      Mouth: Mucous membranes are moist.   Eyes:      Extraocular Movements: Extraocular movements intact.      Pupils: Pupils are equal, round, and reactive to light.   Neck:      Musculoskeletal: Normal range of motion.   Cardiovascular:      Rate and Rhythm: Normal rate and regular rhythm.      Pulses: Normal pulses.   Pulmonary:      Effort: Pulmonary effort is normal.      Breath sounds: Normal breath sounds.   Abdominal:      General: Bowel sounds are normal. There is no distension.      Tenderness: There is no abdominal tenderness.   Musculoskeletal:      Comments: Unable to cooperate with the exam   Skin:     General: Skin is warm.   Neurological:      Mental Status: He is alert.      Comments: Unable to cooperate with the exam   Psychiatric:      Comments: Unable to cooperate with the exam         Significant Labs:   Recent Lab Results       08/23/20  0525        Albumin 2.9     Alkaline Phosphatase 55     ALT 11     Anion Gap 9     AST 23     Baso # 0.01     Basophil% 0.1     BILIRUBIN TOTAL 1.0  Comment:  For infants and newborns, interpretation of results should be based  on gestational age, weight and in agreement with clinical  observations.  Premature Infant recommended  reference ranges:  Up to 24 hours.............<8.0 mg/dL  Up to 48 hours............<12.0 mg/dL  3-5 days..................<15.0 mg/dL  6-29 days.................<15.0 mg/dL       BUN, Bld 30     Calcium 8.3     Chloride 106     CO2 24     Creatinine 1.2     Differential Method Automated     eGFR if African American >60.0     eGFR if non  56.4  Comment:  Calculation used to obtain the estimated glomerular filtration  rate (eGFR) is the CKD-EPI equation.        Eos # 0.3     Eosinophil% 3.9     Glucose 105     Gran # (ANC) 5.2     Gran% 74.7     Hematocrit 26.3     Hemoglobin 8.4     Immature Grans (Abs) 0.03  Comment:  Mild elevation in immature granulocytes is non specific and   can be seen in a variety of conditions including stress response,   acute inflammation, trauma and pregnancy. Correlation with other   laboratory and clinical findings is essential.       Immature Granulocytes 0.4     INR 1.2  Comment:  Coumadin Therapy:  INR: 2.0-3.0 conventional anticoagulation  INR: 2.5-3.5 intensive anticoagulation       Lymph # 1.0     Lymph% 14.9     Magnesium 2.3     MCH 34.1     MCHC 31.9          Mono # 0.4     Mono% 6.0     MPV 10.0     nRBC 0     Platelets 285     Potassium 3.9     PROTEIN TOTAL 6.0     PT 14.5     RBC 2.46     RDW 13.8     Sodium 139     WBC 6.96           Significant Imaging: I have reviewed all pertinent imaging results/findings within the past 24 hours.      Assessment/Plan:   8/23/2020  Mr Chavez had an episode of post op pain relieved with tramadol  A)  Post op pain relieved   Dementia with no behavioral disturbances this date  P)  Continue tramadol  Continue PT  We are seeking SNF placement      * Closed displaced fracture of left femoral neck  S/p  Left hip hemiarthroplasty POD#1  PT/OT recommended SNF  SW Consulted    Anticoagulated  No idea why pt is on Eliquis at home      BRITT (acute kidney injury)  BRITT per today's labs  D/C all Nephrotoxins(HCTZ/ACE  Inh/Lasix)      HTN (hypertension)  Continue present Tx  Patient need prescription for hydralazine when he goes home  Seems Hydralazine is the only med which finally brought his BP down    DNR (do not resuscitate)  DNR      Dementia  Stable issue      CKD (chronic kidney disease), stage III  Chronic stable issue        VTE Risk Mitigation (From admission, onward)         Ordered     enoxaparin injection 40 mg  Every 24 hours (non-standard times)      08/17/20 0858     IP VTE HIGH RISK PATIENT  Once      08/16/20 2128     Place ADONIS hose  Until discontinued      08/16/20 2128     Place sequential compression device  Until discontinued      08/16/20 2128                Discharge Planning   RACHEL:      Code Status: DNR   Is the patient medically ready for discharge?:     Reason for patient still in hospital (select all that apply): Other (specify) dementia with Hx of behavioral disturbances, multiple facilities have rejected the patient due to His Hx  Discharge Plan A: Skilled Nursing Facility                  Otilio Amador MD  Department of Hospital Medicine   [facility]

## 2020-08-23 NOTE — PLAN OF CARE
Problem: Adult Inpatient Plan of Care  Goal: Patient-Specific Goal (Individualization)  Outcome: Ongoing, Progressing     Problem: Infection  Goal: Infection Symptom Resolution  Outcome: Ongoing, Progressing     Problem: Pain Acute  Goal: Optimal Pain Control  Outcome: Ongoing, Progressing     Problem: Wound  Goal: Optimal Wound Healing  Outcome: Ongoing, Progressing     Problem: Bowel Elimination Impaired (Orthopaedic Fracture)  Goal: Effective Bowel Elimination  Outcome: Ongoing, Progressing

## 2020-08-24 LAB
ALBUMIN SERPL BCP-MCNC: 3.1 G/DL (ref 3.5–5.2)
ALP SERPL-CCNC: 57 U/L (ref 55–135)
ALT SERPL W/O P-5'-P-CCNC: 13 U/L (ref 10–44)
ANION GAP SERPL CALC-SCNC: 9 MMOL/L (ref 8–16)
AST SERPL-CCNC: 26 U/L (ref 10–40)
BASOPHILS # BLD AUTO: 0.01 K/UL (ref 0–0.2)
BASOPHILS NFR BLD: 0.1 % (ref 0–1.9)
BILIRUB SERPL-MCNC: 0.9 MG/DL (ref 0.1–1)
BUN SERPL-MCNC: 27 MG/DL (ref 8–23)
CALCIUM SERPL-MCNC: 8.7 MG/DL (ref 8.7–10.5)
CHLORIDE SERPL-SCNC: 108 MMOL/L (ref 95–110)
CO2 SERPL-SCNC: 23 MMOL/L (ref 23–29)
CREAT SERPL-MCNC: 1.2 MG/DL (ref 0.5–1.4)
DIFFERENTIAL METHOD: ABNORMAL
EOSINOPHIL # BLD AUTO: 0.1 K/UL (ref 0–0.5)
EOSINOPHIL NFR BLD: 1.2 % (ref 0–8)
ERYTHROCYTE [DISTWIDTH] IN BLOOD BY AUTOMATED COUNT: 13.9 % (ref 11.5–14.5)
EST. GFR  (AFRICAN AMERICAN): >60 ML/MIN/1.73 M^2
EST. GFR  (NON AFRICAN AMERICAN): 56.4 ML/MIN/1.73 M^2
GLUCOSE SERPL-MCNC: 98 MG/DL (ref 70–110)
HCT VFR BLD AUTO: 25.4 % (ref 40–54)
HGB BLD-MCNC: 8.2 G/DL (ref 14–18)
IMM GRANULOCYTES # BLD AUTO: 0.03 K/UL (ref 0–0.04)
IMM GRANULOCYTES NFR BLD AUTO: 0.4 % (ref 0–0.5)
INR PPP: 1.3
LYMPHOCYTES # BLD AUTO: 1.1 K/UL (ref 1–4.8)
LYMPHOCYTES NFR BLD: 14.1 % (ref 18–48)
MAGNESIUM SERPL-MCNC: 2.1 MG/DL (ref 1.6–2.6)
MCH RBC QN AUTO: 33.5 PG (ref 27–31)
MCHC RBC AUTO-ENTMCNC: 32.3 G/DL (ref 32–36)
MCV RBC AUTO: 104 FL (ref 82–98)
MONOCYTES # BLD AUTO: 0.5 K/UL (ref 0.3–1)
MONOCYTES NFR BLD: 6 % (ref 4–15)
NEUTROPHILS # BLD AUTO: 6.1 K/UL (ref 1.8–7.7)
NEUTROPHILS NFR BLD: 78.2 % (ref 38–73)
NRBC BLD-RTO: 0 /100 WBC
PLATELET # BLD AUTO: 317 K/UL (ref 150–350)
PMV BLD AUTO: 9.9 FL (ref 9.2–12.9)
POTASSIUM SERPL-SCNC: 4.2 MMOL/L (ref 3.5–5.1)
PROT SERPL-MCNC: 6 G/DL (ref 6–8.4)
PROTHROMBIN TIME: 15.3 SEC (ref 10.6–14.8)
RBC # BLD AUTO: 2.45 M/UL (ref 4.6–6.2)
SODIUM SERPL-SCNC: 140 MMOL/L (ref 136–145)
WBC # BLD AUTO: 7.8 K/UL (ref 3.9–12.7)

## 2020-08-24 PROCEDURE — 83735 ASSAY OF MAGNESIUM: CPT

## 2020-08-24 PROCEDURE — 36415 COLL VENOUS BLD VENIPUNCTURE: CPT

## 2020-08-24 PROCEDURE — 12000002 HC ACUTE/MED SURGE SEMI-PRIVATE ROOM

## 2020-08-24 PROCEDURE — 25000003 PHARM REV CODE 250: Performed by: INTERNAL MEDICINE

## 2020-08-24 PROCEDURE — 63600175 PHARM REV CODE 636 W HCPCS: Performed by: INTERNAL MEDICINE

## 2020-08-24 PROCEDURE — 85025 COMPLETE CBC W/AUTO DIFF WBC: CPT

## 2020-08-24 PROCEDURE — 97530 THERAPEUTIC ACTIVITIES: CPT

## 2020-08-24 PROCEDURE — 85610 PROTHROMBIN TIME: CPT

## 2020-08-24 PROCEDURE — 97535 SELF CARE MNGMENT TRAINING: CPT

## 2020-08-24 PROCEDURE — 80053 COMPREHEN METABOLIC PANEL: CPT

## 2020-08-24 RX ORDER — TALC
6 POWDER (GRAM) TOPICAL ONCE
Status: COMPLETED | OUTPATIENT
Start: 2020-08-24 | End: 2020-08-24

## 2020-08-24 RX ADMIN — HYDRALAZINE HYDROCHLORIDE 25 MG: 25 TABLET, FILM COATED ORAL at 09:08

## 2020-08-24 RX ADMIN — LORAZEPAM 1 MG: 2 INJECTION INTRAMUSCULAR; INTRAVENOUS at 12:08

## 2020-08-24 RX ADMIN — LACTULOSE 20 G: 20 SOLUTION ORAL at 03:08

## 2020-08-24 RX ADMIN — HALOPERIDOL 5 MG: 5 TABLET ORAL at 09:08

## 2020-08-24 RX ADMIN — MELATONIN 6 MG: at 11:08

## 2020-08-24 RX ADMIN — ENOXAPARIN SODIUM 40 MG: 100 INJECTION SUBCUTANEOUS at 05:08

## 2020-08-24 RX ADMIN — HYDRALAZINE HYDROCHLORIDE 25 MG: 25 TABLET, FILM COATED ORAL at 03:08

## 2020-08-24 RX ADMIN — LACTULOSE 20 G: 20 SOLUTION ORAL at 09:08

## 2020-08-24 RX ADMIN — HYDRALAZINE HYDROCHLORIDE 25 MG: 25 TABLET, FILM COATED ORAL at 05:08

## 2020-08-24 RX ADMIN — ISOSORBIDE MONONITRATE 60 MG: 60 TABLET, EXTENDED RELEASE ORAL at 09:08

## 2020-08-24 RX ADMIN — ATORVASTATIN CALCIUM 20 MG: 20 TABLET, FILM COATED ORAL at 09:08

## 2020-08-24 RX ADMIN — TRAMADOL HYDROCHLORIDE 50 MG: 50 TABLET, FILM COATED ORAL at 09:08

## 2020-08-24 NOTE — PLAN OF CARE
Problem: Occupational Therapy Goal  Goal: Occupational Therapy Goal  Description: Goals to be met by: discharge     Patient will increase functional independence with ADLs by performing:    LE Dressing with Minimal Assistance and AD as needed.  Grooming while seated with Supervision.  Toileting from bedside commode with Stand-by Assistance for hygiene and clothing management.   Toilet transfer to bedside commode with Stand-by Assistance.    Outcome: Ongoing, Not Progressing

## 2020-08-24 NOTE — PT/OT/SLP PROGRESS
Physical Therapy  Treatment    Tomasz Chavez   MRN: [id number]   Admitting Diagnosis: Closed displaced fracture of left femoral neck    PT Received On: 08/24/20  PT Start Time: 0920     PT Stop Time: 0935    PT Total Time (min): 15 min       Billable Minutes:  Therapeutic Activity 15    Treatment Type: Treatment  PT/PTA: PT     PTA Visit Number: 0       General Precautions: Standard, fall  Orthopedic Precautions: (LLE WBAT)   Braces: N/A    Spiritual, Cultural Beliefs, Anabaptist Practices, Values that Affect Care: no    Subjective:  Communicated with RN prior to session. RN states client with new order for Tramadol 8/23/2020 due to episode of somnolence S/P a dose of pain medication on 8/22/2020    Pain/Comfort  Pain Rating 1: (Pt unable to quantify pain but demonstrates decrease left LE WB in static standing)  Location - Side 1: Left  Location 1: hip    Objective:   Patient found with: brock catheter, bed alarm    Functional Mobility:  Bed Mobility:    Pt performed right and left rolling with min/mod A due to decrease command follow for reaching for bedside rail.  Pt performed supine to sit with mod A and sit to supine with min/mod A x 2 for supine to sit    Transfers:   Pt performed sit to stand x 3 attempts with min A x 2 initially decrease to CGA x 2 with increase attempts. Decrease WB through left LE noted in static standing at RW    Gait:    Attempted ambulation but unable to tolerate left lower extremity WS to progress R LE x 3 attempts. No verbalization of pain    Balance:   Static Sit: FAIR-: Maintains without assist but inconsistent   Dynamic Sit: FAIR: Cannot move trunk without losing balance  Static Stand: POOR+: Needs MINIMAL assist to maintain  Dynamic stand: POOR+: Needs MIN (minimal ) assist during gait       Therapeutic Activities and Exercises:  Pt educated on proper HP with sit to stand transfers. Demonstrates proper HP 2/3 trails from EOB     AM-PAC 6 CLICK MOBILITY  How much help from another  person does this patient currently need?   1 = Unable, Total/Dependent Assistance  2 = A lot, Maximum/Moderate Assistance  3 = A little, Minimum/Contact Guard/Supervision  4 = None, Modified Barry/Independent    Turning over in bed (including adjusting bedclothes, sheets and blankets)?: 3  Sitting down on and standing up from a chair with arms (e.g., wheelchair, bedside commode, etc.): 2  Moving from lying on back to sitting on the side of the bed?: 3  Moving to and from a bed to a chair (including a wheelchair)?: 2  Need to walk in hospital room?: 1  Climbing 3-5 steps with a railing?: 1  Basic Mobility Total Score: 12    AM-PAC Raw Score CMS G-Code Modifier Level of Impairment Assistance   6 % Total / Unable   7 - 9 CM 80 - 100% Maximal Assist   10 - 14 CL 60 - 80% Moderate Assist   15 - 19 CK 40 - 60% Moderate Assist   20 - 22 CJ 20 - 40% Minimal Assist   23 CI 1-20% SBA / CGA   24 CH 0% Independent/ Mod I     Patient left supine with all lines intact, call button in reach, bed alarm on and RN notified.    Assessment:  Tomasz Chavez is a 79 y.o. male with a medical diagnosis of Closed displaced fracture of left femoral neck and presents with decrease left lower extremity WB in static standing. RN states pain meds were changed over the weekend due to episode of somnolence. No verbalization of pain or agitation noted throughout session but unable to ambulate due to decrease left LE WB with RW x 3 trials with assist x 2.     Rehab identified problem list/impairments: Rehab identified problem list/impairments: weakness, impaired endurance, impaired self care skills, impaired functional mobilty, gait instability, impaired balance, decreased lower extremity function, decreased safety awareness, pain, orthopedic precautions    Rehab potential is fair.    Activity tolerance: Fair    Discharge recommendations: Discharge Facility/Level of Care Needs: nursing facility, skilled     Barriers to discharge:  Decrease L LE WB, poor command follow,    Equipment recommendations: Equipment Needed After Discharge: walker, rolling     GOALS:   Multidisciplinary Problems     Physical Therapy Goals        Problem: Physical Therapy Goal    Goal Priority Disciplines Outcome Goal Variances Interventions   Physical Therapy Goal     PT, PT/OT Ongoing, Progressing     Description: Goals to be met by: 9/15/20     Patient will increase functional independence with mobility by performin. Supine to sit with Stand-by Assistance  2. Sit to stand transfer with Minimal Assistance  3. Gait  x 150 feet with Minimal Assistance using Rolling Walker.   4. Lower extremity exercise program x20-30 reps per handout, with supervision                     PLAN:    Patient to be seen 6 x/week  to address the above listed problems via gait training, therapeutic activities, therapeutic exercises, neuromuscular re-education  Plan of Care expires: 20  Plan of Care reviewed with: patient         Arleth Kathy, PT  2020

## 2020-08-24 NOTE — PLAN OF CARE
08/24/20 1612   Post-Acute Status   Post-Acute Authorization Hospice   Post-Acute Placement Status Referrals Sent   Spoke with patient about Freedom of Choice Form, explained to patient and family that they have the right to choose any agency or preference, and a list of agencies was provided to patient and family to review, they verbalized an understanding, was able to get phone consent from Son with Olga Rubio RN as witness, and form scanned into CM notes. Sent referral to [facility]. Talked to Sujata who will look at it and get in touch with son.

## 2020-08-24 NOTE — SUBJECTIVE & OBJECTIVE
Interval History: Pt had an episode of urinary retention requiring placement of a brock catheter .    Review of Systems   Unable to perform ROS: Dementia     Objective:     Vital Signs (Most Recent):  Temp: 99.2 °F (37.3 °C) (08/24/20 1139)  Pulse: 84 (08/24/20 1139)  Resp: 19 (08/24/20 1139)  BP: (!) 152/83 (08/24/20 1139)  SpO2: 98 % (08/24/20 1139) Vital Signs (24h Range):  Temp:  [98.3 °F (36.8 °C)-99.2 °F (37.3 °C)] 99.2 °F (37.3 °C)  Pulse:  [] 84  Resp:  [19-20] 19  SpO2:  [95 %-98 %] 98 %  BP: (138-169)/(78-90) 152/83     Weight: 74.8 kg (165 lb)  Body mass index is 25.09 kg/m².    Intake/Output Summary (Last 24 hours) at 8/24/2020 1537  Last data filed at 8/23/2020 2100  Gross per 24 hour   Intake --   Output 800 ml   Net -800 ml      Physical Exam  Constitutional:       General: He is in acute distress.      Appearance: Normal appearance. He is not ill-appearing.   HENT:      Head: Normocephalic and atraumatic.      Nose: Nose normal.      Mouth/Throat:      Mouth: Mucous membranes are moist.   Eyes:      Pupils: Pupils are equal, round, and reactive to light.   Neck:      Musculoskeletal: Normal range of motion.   Cardiovascular:      Rate and Rhythm: Normal rate and regular rhythm.   Pulmonary:      Effort: Pulmonary effort is normal.      Breath sounds: Normal breath sounds.   Abdominal:      General: Bowel sounds are normal. There is no distension.      Tenderness: There is no abdominal tenderness.   Musculoskeletal: Normal range of motion.         General: Tenderness present.      Comments: Painful ROM of the left lower extremity   Skin:     General: Skin is warm.   Neurological:      Mental Status: He is alert.      Comments: Unable to cooperate with the exam   Psychiatric:      Comments: Unable to cooperate with the exam         Significant Labs:   Recent Lab Results       08/24/20  0529        Albumin 3.1     Alkaline Phosphatase 57     ALT 13     Anion Gap 9     AST 26     Baso # 0.01      Basophil% 0.1     BILIRUBIN TOTAL 0.9  Comment:  For infants and newborns, interpretation of results should be based  on gestational age, weight and in agreement with clinical  observations.  Premature Infant recommended reference ranges:  Up to 24 hours.............<8.0 mg/dL  Up to 48 hours............<12.0 mg/dL  3-5 days..................<15.0 mg/dL  6-29 days.................<15.0 mg/dL       BUN, Bld 27     Calcium 8.7     Chloride 108     CO2 23     Creatinine 1.2     Differential Method Automated     eGFR if African American >60.0     eGFR if non  56.4  Comment:  Calculation used to obtain the estimated glomerular filtration  rate (eGFR) is the CKD-EPI equation.        Eos # 0.1     Eosinophil% 1.2     Glucose 98     Gran # (ANC) 6.1     Gran% 78.2     Hematocrit 25.4     Hemoglobin 8.2     Immature Grans (Abs) 0.03  Comment:  Mild elevation in immature granulocytes is non specific and   can be seen in a variety of conditions including stress response,   acute inflammation, trauma and pregnancy. Correlation with other   laboratory and clinical findings is essential.       Immature Granulocytes 0.4     INR 1.3  Comment:  Coumadin Therapy:  INR: 2.0-3.0 conventional anticoagulation  INR: 2.5-3.5 intensive anticoagulation       Lymph # 1.1     Lymph% 14.1     Magnesium 2.1     MCH 33.5     MCHC 32.3          Mono # 0.5     Mono% 6.0     MPV 9.9     nRBC 0     Platelets 317     Potassium 4.2     PROTEIN TOTAL 6.0     PT 15.3     RBC 2.45     RDW 13.9     Sodium 140     WBC 7.80           Significant Imaging: I have reviewed all pertinent imaging results/findings within the past 24 hours.

## 2020-08-24 NOTE — PT/OT/SLP PROGRESS
Occupational Therapy   Treatment    Name: Tomasz Chavez  MRN: [id number]  Admitting Diagnosis:  Closed displaced fracture of left femoral neck  8 Days Post-Op    Recommendations:     Discharge Recommendations: nursing facility, skilled  Discharge Equipment Recommendations:  (TBD next level of care)  Barriers to discharge:  Decreased caregiver support    Assessment:     Tomasz Chavez is a 79 y.o. male with a medical diagnosis of Closed displaced fracture of left femoral neck. Performance deficits affecting function are weakness, impaired endurance, impaired self care skills, impaired functional mobilty, gait instability, impaired balance, impaired cognition, decreased coordination, decreased upper extremity function, decreased lower extremity function, decreased safety awareness, pain, orthopedic precautions. Pt with increased confusion today, requiring max verbal/tactile cues for simple g/h task seated EOB. Pt unable to state name, but says yes when given options multiple choice. Pt very fidgety with sheet and brock, requiring cues to not pull at brock.     Rehab Prognosis:  Fair; patient would benefit from acute skilled OT services to address these deficits and reach maximum level of function.       Plan:     Patient to be seen 5 x/week to address the above listed problems via self-care/home management, therapeutic activities, therapeutic exercises  · Plan of Care Expires: 08/18/20  · Plan of Care Reviewed with: patient    Subjective     Pain/Comfort:  · Pain Rating 1: (not rated)  · Location - Side 1: Left  · Location 1: hip  · Pain Addressed 1: Cessation of Activity, Distraction, Reposition    Objective:     Communicated with: nursing prior to session.  Patient found HOB elevated with brock catheter, bed alarm(sitter) upon OT entry to room.    General Precautions: Standard, fall   Orthopedic Precautions:LLE weight bearing as tolerated   Braces: N/A     Occupational Performance:     Bed Mobility:    · Patient  completed Supine to Sit with moderate assistance and 2 persons  · Patient completed Sit to Supine with moderate assistance and 2 persons     Functional Mobility/Transfers:  · Patient completed Sit <> Stand Transfer with minimum assistance and of 2 persons  with  rolling walker   · Functional Mobility: pt able to take one shuffled side step towards HOB with RW with max v/c's    Activities of Daily Living:  · Grooming: maximal assistance for verbal/tactile cues to bring washcloth to face to wash face and for oral care with swab seated EOB    Treatment & Education:  ADL training     Patient left HOB elevated with all lines intact, call button in reach, bed alarm on and sitter presentEducation:      GOALS:   Multidisciplinary Problems     Occupational Therapy Goals        Problem: Occupational Therapy Goal    Goal Priority Disciplines Outcome Interventions   Occupational Therapy Goal     OT, PT/OT Ongoing, Not Progressing    Description: Goals to be met by: discharge     Patient will increase functional independence with ADLs by performing:    LE Dressing with Minimal Assistance and AD as needed.  Grooming while seated with Supervision.  Toileting from bedside commode with Stand-by Assistance for hygiene and clothing management.   Toilet transfer to bedside commode with Stand-by Assistance.                     Time Tracking:     OT Date of Treatment: 08/24/20  OT Start Time: 0904  OT Stop Time: 0921  OT Total Time (min): 17 min    Billable Minutes:Self Care/Home Management 17    Rashmi Huff OT  8/24/2020

## 2020-08-24 NOTE — PROGRESS NOTES
[facility]  Progress Note    Patient Name: Tomasz Chavez  MRN: [id number]  Patient Class: IP- Inpatient   Admission Date: 8/15/2020  Length of Stay: 9 days  Attending Physician: Otilio Amador MD  Primary Care Provider: Kendrick Hatfield MD        Subjective:     Principal Problem:Closed displaced fracture of left femoral neck        HPI:  79 year old patient admitted with  Acute subcapital fracture of left femoral neck, with mild impaction  Patient was walking around today and had a fall  Later he felt severe pain on L hip area and was unable to move LLE  Patient described pain as severe/constant/with no radiation  Also he was unable to support his weight       Overview/Hospital Course:  08/16  Pt today had Left hip hemiarthroplasty  No other issues  BP well controlled    08/17  Pt had confused episodes yesterday night  Sitter near bedside    8/18/2020  Pt denies any pain and is progressing with PT. Interaction limited by dementia.    8/19/2020  Pt had PM encephalopathy that has resolved. He states that he has no pain, no problems.    8/20/2020  Mr Chavez is cooperative and working with PT this AM. No agitation    8/21/2020  Mr Chavez has had no agitation. He is cooperating with PT and staff.    8/22/2020  Pt had an episode of somnolence S/P a dose of pain medication. The patient recovered in a very short period of time and his pain medication was stopped for the present time    8/23/2020  Mr Riggins has noted pain in the post op extremity. Pt has an episode of encephalopathy and pain medications were discontinued. Pt had 6-8/10 painful face this date. He is unable to provide any more information.    8/24/2020  Mr Chavez had an episode of urinary retention last PM. He will not tolerate in and out cath's and he could pull out a brock. I have spoken to his son who desires to take the patient home on hospice. It is the only reasonable option as all facilities have refused  him.    Interval History: Pt had an episode of urinary retention requiring placement of a brock catheter . I have spoken to his son who desires to take the patient home on hospice as there are no other options    Review of Systems   Unable to perform ROS: Dementia     Objective:     Vital Signs (Most Recent):  Temp: 99.2 °F (37.3 °C) (08/24/20 1139)  Pulse: 84 (08/24/20 1139)  Resp: 19 (08/24/20 1139)  BP: (!) 152/83 (08/24/20 1139)  SpO2: 98 % (08/24/20 1139) Vital Signs (24h Range):  Temp:  [98.3 °F (36.8 °C)-99.2 °F (37.3 °C)] 99.2 °F (37.3 °C)  Pulse:  [] 84  Resp:  [19-20] 19  SpO2:  [95 %-98 %] 98 %  BP: (138-169)/(78-90) 152/83     Weight: 74.8 kg (165 lb)  Body mass index is 25.09 kg/m².    Intake/Output Summary (Last 24 hours) at 8/24/2020 1537  Last data filed at 8/23/2020 2100  Gross per 24 hour   Intake --   Output 800 ml   Net -800 ml      Physical Exam  Constitutional:       General: He is in acute distress.      Appearance: Normal appearance. He is not ill-appearing.   HENT:      Head: Normocephalic and atraumatic.      Nose: Nose normal.      Mouth/Throat:      Mouth: Mucous membranes are moist.   Eyes:      Pupils: Pupils are equal, round, and reactive to light.   Neck:      Musculoskeletal: Normal range of motion.   Cardiovascular:      Rate and Rhythm: Normal rate and regular rhythm.   Pulmonary:      Effort: Pulmonary effort is normal.      Breath sounds: Normal breath sounds.   Abdominal:      General: Bowel sounds are normal. There is no distension.      Tenderness: There is no abdominal tenderness.   Musculoskeletal: Normal range of motion.         General: Tenderness present.      Comments: Painful ROM of the left lower extremity   Skin:     General: Skin is warm.   Neurological:      Mental Status: He is alert.      Comments: Unable to cooperate with the exam   Psychiatric:      Comments: Unable to cooperate with the exam         Significant Labs:   Recent Lab Results        08/24/20  0529        Albumin 3.1     Alkaline Phosphatase 57     ALT 13     Anion Gap 9     AST 26     Baso # 0.01     Basophil% 0.1     BILIRUBIN TOTAL 0.9  Comment:  For infants and newborns, interpretation of results should be based  on gestational age, weight and in agreement with clinical  observations.  Premature Infant recommended reference ranges:  Up to 24 hours.............<8.0 mg/dL  Up to 48 hours............<12.0 mg/dL  3-5 days..................<15.0 mg/dL  6-29 days.................<15.0 mg/dL       BUN, Bld 27     Calcium 8.7     Chloride 108     CO2 23     Creatinine 1.2     Differential Method Automated     eGFR if African American >60.0     eGFR if non  56.4  Comment:  Calculation used to obtain the estimated glomerular filtration  rate (eGFR) is the CKD-EPI equation.        Eos # 0.1     Eosinophil% 1.2     Glucose 98     Gran # (ANC) 6.1     Gran% 78.2     Hematocrit 25.4     Hemoglobin 8.2     Immature Grans (Abs) 0.03  Comment:  Mild elevation in immature granulocytes is non specific and   can be seen in a variety of conditions including stress response,   acute inflammation, trauma and pregnancy. Correlation with other   laboratory and clinical findings is essential.       Immature Granulocytes 0.4     INR 1.3  Comment:  Coumadin Therapy:  INR: 2.0-3.0 conventional anticoagulation  INR: 2.5-3.5 intensive anticoagulation       Lymph # 1.1     Lymph% 14.1     Magnesium 2.1     MCH 33.5     MCHC 32.3          Mono # 0.5     Mono% 6.0     MPV 9.9     nRBC 0     Platelets 317     Potassium 4.2     PROTEIN TOTAL 6.0     PT 15.3     RBC 2.45     RDW 13.9     Sodium 140     WBC 7.80           Significant Imaging: I have reviewed all pertinent imaging results/findings within the past 24 hours..      Assessment/Plan:   8/24/2020  Pt had an episode of urinary retention requiring placement of a brock catheter  His son desires to take him home on hospice as no facility will accept  him.   A)  S/P left hip fracture with pain-he does cooperate with PT  Urinary retention-brock will likely remain in place as the bladder volume was 900 cc and he will not tolerate I&O cath's  Dementia with behavioral disturbance requiring redirection and PO haldol  DNR  P)  Brock catheter  Urology consult  Hospice consulted for home care      * Closed displaced fracture of left femoral neck  S/p  Left hip hemiarthroplasty POD#1  PT/OT recommended SNF  SW Consulted    Anticoagulated  No idea why pt is on Eliquis at home      BRITT (acute kidney injury)  BRITT per today's labs  D/C all Nephrotoxins(HCTZ/ACE Inh/Lasix)      HTN (hypertension)  Continue present Tx  Patient need prescription for hydralazine when he goes home  Seems Hydralazine is the only med which finally brought his BP down    DNR (do not resuscitate)  DNR      Dementia  Stable issue      CKD (chronic kidney disease), stage III  Chronic stable issue        VTE Risk Mitigation (From admission, onward)         Ordered     enoxaparin injection 40 mg  Every 24 hours (non-standard times)      08/17/20 0858     IP VTE HIGH RISK PATIENT  Once      08/16/20 2128     Place ADONIS hose  Until discontinued      08/16/20 2128     Place sequential compression device  Until discontinued      08/16/20 2128                Discharge Planning   RACHEL:      Code Status: DNR   Is the patient medically ready for discharge?:     Reason for patient still in hospital (select all that apply): Treatment and Other (specify) dementia with behavioral problems requiring antipsychotics No facility will accept him!!!  Discharge Plan A: Skilled Nursing Facility                  Otilio Amador MD  Department of Hospital Medicine   [facility]

## 2020-08-24 NOTE — PLAN OF CARE
Problem: Physical Therapy Goal  Goal: Physical Therapy Goal  Description: Goals to be met by: 9/15/20     Patient will increase functional independence with mobility by performin. Supine to sit with Stand-by Assistance  2. Sit to stand transfer with Minimal Assistance  3. Gait  x 150 feet with Minimal Assistance using Rolling Walker.   4. Lower extremity exercise program x20-30 reps per handout, with supervision    Outcome: Ongoing, Progressing

## 2020-08-25 PROBLEM — R33.9 URINARY RETENTION: Status: ACTIVE | Noted: 2020-08-25

## 2020-08-25 LAB
ALBUMIN SERPL BCP-MCNC: 2.8 G/DL (ref 3.5–5.2)
ALP SERPL-CCNC: 56 U/L (ref 55–135)
ALT SERPL W/O P-5'-P-CCNC: 15 U/L (ref 10–44)
ANION GAP SERPL CALC-SCNC: 10 MMOL/L (ref 8–16)
AST SERPL-CCNC: 25 U/L (ref 10–40)
BASOPHILS # BLD AUTO: 0.01 K/UL (ref 0–0.2)
BASOPHILS NFR BLD: 0.1 % (ref 0–1.9)
BILIRUB SERPL-MCNC: 0.8 MG/DL (ref 0.1–1)
BUN SERPL-MCNC: 28 MG/DL (ref 8–23)
CALCIUM SERPL-MCNC: 8.6 MG/DL (ref 8.7–10.5)
CHLORIDE SERPL-SCNC: 108 MMOL/L (ref 95–110)
CO2 SERPL-SCNC: 22 MMOL/L (ref 23–29)
CREAT SERPL-MCNC: 1.2 MG/DL (ref 0.5–1.4)
DIFFERENTIAL METHOD: ABNORMAL
EOSINOPHIL # BLD AUTO: 0.1 K/UL (ref 0–0.5)
EOSINOPHIL NFR BLD: 1 % (ref 0–8)
ERYTHROCYTE [DISTWIDTH] IN BLOOD BY AUTOMATED COUNT: 14.1 % (ref 11.5–14.5)
EST. GFR  (AFRICAN AMERICAN): >60 ML/MIN/1.73 M^2
EST. GFR  (NON AFRICAN AMERICAN): 56.4 ML/MIN/1.73 M^2
GLUCOSE SERPL-MCNC: 98 MG/DL (ref 70–110)
HCT VFR BLD AUTO: 25.4 % (ref 40–54)
HGB BLD-MCNC: 8.1 G/DL (ref 14–18)
IMM GRANULOCYTES # BLD AUTO: 0.03 K/UL (ref 0–0.04)
IMM GRANULOCYTES NFR BLD AUTO: 0.4 % (ref 0–0.5)
INR PPP: 1.3
LYMPHOCYTES # BLD AUTO: 1.1 K/UL (ref 1–4.8)
LYMPHOCYTES NFR BLD: 14.7 % (ref 18–48)
MAGNESIUM SERPL-MCNC: 2.1 MG/DL (ref 1.6–2.6)
MCH RBC QN AUTO: 33.8 PG (ref 27–31)
MCHC RBC AUTO-ENTMCNC: 31.9 G/DL (ref 32–36)
MCV RBC AUTO: 106 FL (ref 82–98)
MONOCYTES # BLD AUTO: 0.3 K/UL (ref 0.3–1)
MONOCYTES NFR BLD: 4.7 % (ref 4–15)
NEUTROPHILS # BLD AUTO: 5.7 K/UL (ref 1.8–7.7)
NEUTROPHILS NFR BLD: 79.1 % (ref 38–73)
NRBC BLD-RTO: 0 /100 WBC
PLATELET # BLD AUTO: 332 K/UL (ref 150–350)
PMV BLD AUTO: 10.2 FL (ref 9.2–12.9)
POTASSIUM SERPL-SCNC: 3.9 MMOL/L (ref 3.5–5.1)
PROT SERPL-MCNC: 5.9 G/DL (ref 6–8.4)
PROTHROMBIN TIME: 15.8 SEC (ref 10.6–14.8)
RBC # BLD AUTO: 2.4 M/UL (ref 4.6–6.2)
SODIUM SERPL-SCNC: 140 MMOL/L (ref 136–145)
WBC # BLD AUTO: 7.19 K/UL (ref 3.9–12.7)

## 2020-08-25 PROCEDURE — 63600175 PHARM REV CODE 636 W HCPCS: Performed by: INTERNAL MEDICINE

## 2020-08-25 PROCEDURE — 85025 COMPLETE CBC W/AUTO DIFF WBC: CPT

## 2020-08-25 PROCEDURE — 97530 THERAPEUTIC ACTIVITIES: CPT | Mod: CQ

## 2020-08-25 PROCEDURE — 80053 COMPREHEN METABOLIC PANEL: CPT

## 2020-08-25 PROCEDURE — 63600175 PHARM REV CODE 636 W HCPCS: Performed by: STUDENT IN AN ORGANIZED HEALTH CARE EDUCATION/TRAINING PROGRAM

## 2020-08-25 PROCEDURE — 85610 PROTHROMBIN TIME: CPT

## 2020-08-25 PROCEDURE — 97535 SELF CARE MNGMENT TRAINING: CPT

## 2020-08-25 PROCEDURE — 97530 THERAPEUTIC ACTIVITIES: CPT

## 2020-08-25 PROCEDURE — 36415 COLL VENOUS BLD VENIPUNCTURE: CPT

## 2020-08-25 PROCEDURE — 97116 GAIT TRAINING THERAPY: CPT | Mod: CQ

## 2020-08-25 PROCEDURE — 83735 ASSAY OF MAGNESIUM: CPT

## 2020-08-25 PROCEDURE — 25000003 PHARM REV CODE 250: Performed by: INTERNAL MEDICINE

## 2020-08-25 PROCEDURE — 25000003 PHARM REV CODE 250: Performed by: UROLOGY

## 2020-08-25 PROCEDURE — 12000002 HC ACUTE/MED SURGE SEMI-PRIVATE ROOM

## 2020-08-25 RX ORDER — HALOPERIDOL 5 MG/ML
5 INJECTION INTRAMUSCULAR EVERY 6 HOURS PRN
Status: DISCONTINUED | OUTPATIENT
Start: 2020-08-25 | End: 2020-08-26 | Stop reason: HOSPADM

## 2020-08-25 RX ORDER — TAMSULOSIN HYDROCHLORIDE 0.4 MG/1
0.4 CAPSULE ORAL NIGHTLY
Status: DISCONTINUED | OUTPATIENT
Start: 2020-08-25 | End: 2020-08-26

## 2020-08-25 RX ADMIN — LACTULOSE 20 G: 20 SOLUTION ORAL at 04:08

## 2020-08-25 RX ADMIN — HYDRALAZINE HYDROCHLORIDE 25 MG: 25 TABLET, FILM COATED ORAL at 08:08

## 2020-08-25 RX ADMIN — HYDRALAZINE HYDROCHLORIDE 25 MG: 25 TABLET, FILM COATED ORAL at 04:08

## 2020-08-25 RX ADMIN — ATORVASTATIN CALCIUM 20 MG: 20 TABLET, FILM COATED ORAL at 08:08

## 2020-08-25 RX ADMIN — HYDRALAZINE HYDROCHLORIDE 25 MG: 25 TABLET, FILM COATED ORAL at 05:08

## 2020-08-25 RX ADMIN — ISOSORBIDE MONONITRATE 60 MG: 60 TABLET, EXTENDED RELEASE ORAL at 08:08

## 2020-08-25 RX ADMIN — HALOPERIDOL 5 MG: 5 TABLET ORAL at 08:08

## 2020-08-25 RX ADMIN — TAMSULOSIN HYDROCHLORIDE 0.4 MG: 0.4 CAPSULE ORAL at 08:08

## 2020-08-25 RX ADMIN — LACTULOSE 20 G: 20 SOLUTION ORAL at 08:08

## 2020-08-25 RX ADMIN — ENOXAPARIN SODIUM 40 MG: 100 INJECTION SUBCUTANEOUS at 04:08

## 2020-08-25 RX ADMIN — HALOPERIDOL LACTATE 5 MG: 5 INJECTION, SOLUTION INTRAMUSCULAR at 12:08

## 2020-08-25 RX ADMIN — TRAMADOL HYDROCHLORIDE 50 MG: 50 TABLET, FILM COATED ORAL at 08:08

## 2020-08-25 NOTE — PLAN OF CARE
Problem: Fall Injury Risk  Goal: Absence of Fall and Fall-Related Injury  Outcome: Ongoing, Progressing     Problem: Adult Inpatient Plan of Care  Goal: Patient-Specific Goal (Individualization)  Outcome: Ongoing, Progressing     Problem: Infection  Goal: Infection Symptom Resolution  Outcome: Ongoing, Progressing     Problem: Wound  Goal: Optimal Wound Healing  Outcome: Ongoing, Progressing

## 2020-08-25 NOTE — PLAN OF CARE
08/25/20 1321   Discharge Reassessment   Assessment Type Discharge Planning Reassessment   Anticipated Discharge Disposition HospiceHome   Post-Acute Status   Post-Acute Authorization Hospice   Hospice Status Pending External Education   ALISIA filling in for ALISIA Soto following up on hospice consult this date. Per Dr. Cheng pt should be ready for d/c once hospice is set. ALISIA spoke with pt's son Tyler ([phone]), who stated he is meeting with [facility] at 14:00 this afternoon. ALISIA informed Tyler that once equipment is set up pt should be ready for d/c, possibly later this evening. Tyler stated he did not think that would be the case, but [facility] encouraged Tyler to be prepared for the possibility. Tyler will likely provide transportation, as pt appears to be mid-mod assist for most activities.

## 2020-08-25 NOTE — PROGRESS NOTES
[facility] Medicine  Progress Note    Patient Name: Tomasz Chavez  MRN: [id number]  Patient Class: IP- Inpatient   Admission Date: 8/15/2020  Length of Stay: 10 days  Attending Physician: Merari Cheng DO  Primary Care Provider: Kendrick Hatfield MD        Subjective:     Principal Problem:Closed displaced fracture of left femoral neck  Chief Complaint   Patient presents with    Hip Pain     c/o L hip pain s/p fall. unwitnessed.      Interval History:  Afebrile, BP mildly this morning.  Satting well air.  Consider not drink patient reports no episodes agitation today. Resting quietly.  Plans for bleeding with patient's family and hospice later today.    Review of Systems   Unable to obtain 2/2 dementia    Objective:     Vital Signs (Most Recent):  Temp: 96 °F (35.6 °C) (08/25/20 0333)  Pulse: 88 (08/25/20 0333)  Resp: 20 (08/25/20 0333)  BP: (!) 147/78 (08/25/20 0333)  SpO2: 96 % (08/25/20 0333) Vital Signs (24h Range):  Temp:  [96 °F (35.6 °C)-99.2 °F (37.3 °C)] 96 °F (35.6 °C)  Pulse:  [84-92] 88  Resp:  [19-20] 20  SpO2:  [96 %-98 %] 96 %  BP: (138-152)/(78-90) 147/78     Weight: 74.8 kg (165 lb)  Body mass index is 25.09 kg/m².    Intake/Output Summary (Last 24 hours) at 8/25/2020 0805  Last data filed at 8/25/2020 0600  Gross per 24 hour   Intake --   Output 400 ml   Net -400 ml      Physical Exam  Constitutional:       General: He is in acute distress.      Appearance: Normal appearance. He is not ill-appearing.   HENT:      Head: Normocephalic and atraumatic.      Nose: Nose normal.      Mouth/Throat:      Mouth: Mucous membranes are moist.   Eyes:      Pupils: Pupils are equal, round, and reactive to light.   Neck:      Musculoskeletal: Normal range of motion.   Cardiovascular:      Rate and Rhythm: Normal rate and regular rhythm.   Pulmonary:      Effort: Pulmonary effort is normal.      Breath sounds: Normal breath sounds.   Abdominal:      General: Bowel sounds are normal.  There is no distension.      Tenderness: There is no abdominal tenderness.   Musculoskeletal: Normal range of motion.         General: Tenderness present.      Comments: Painful ROM of the left lower extremity   Skin:     General: Skin is warm.   Neurological:      Mental Status: He is alert.      Comments: Unable to cooperate with the exam   Psychiatric:      Comments: Unable to cooperate with the exam         Significant Labs:   CBC:   Recent Labs   Lab 08/24/20  0529 08/25/20  0548   WBC 7.80 7.19   HGB 8.2* 8.1*   HCT 25.4* 25.4*    332     CMP:   Recent Labs   Lab 08/24/20  0529 08/25/20  0548    140   K 4.2 3.9    108   CO2 23 22*   GLU 98 98   BUN 27* 28*   CREATININE 1.2 1.2   CALCIUM 8.7 8.6*   PROT 6.0 5.9*   ALBUMIN 3.1* 2.8*   BILITOT 0.9 0.8   ALKPHOS 57 56   AST 26 25   ALT 13 15   ANIONGAP 9 10   EGFRNONAA 56.4* 56.4*     All pertinent labs within the past 24 hours have been reviewed.    Significant Imaging: no new images      Assessment/Plan:     Active Hospital Problems    Diagnosis  POA    *Closed displaced fracture of left femoral neck [S72.002A]  Yes    Urinary retention [R33.9]  No    BRITT (acute kidney injury) [N17.9]  No    Anticoagulated [Z79.01]  Not Applicable    HTN (hypertension) [I10]  Yes    DNR (do not resuscitate) [Z66]  Yes    CKD (chronic kidney disease), stage III [N18.3]  Yes    Dementia [F03.90]  Yes      Resolved Hospital Problems   No resolved problems to display.        * Closed displaced fracture of left femoral neck  S/p  Left hip hemiarthroplasty 8/16  PT/OT consultede  SW Consulted  Routine post op care    Anticoagulated   on Eliquis at home      BRITT (acute kidney injury)- resolved  CKD (chronic kidney disease), stage III  D/C all Nephrotoxins(HCTZ/ACE Inh/Lasix)  Bun/cr 28/1.2 today  Baseline sCr 1.3      Urinary retention      Chun in place      Urology consulted      HTN (hypertension)  Mildly elevated this am.   Hydralazine 25mg q 8  hours  Home medications: clonidine, hctz, lasix, lisinopril held   Patient need prescription for hydralazine when he goes home    DNR (do not resuscitate)  DNR      Dementia  Chronic issue    Dispo: family to meet with hospice to day with goal to for home hospice    VTE Risk Mitigation (From admission, onward)         Ordered     enoxaparin injection 40 mg  Every 24 hours (non-standard times)      08/17/20 0858     IP VTE HIGH RISK PATIENT  Once      08/16/20 2128     Place ADONIS hose  Until discontinued      08/16/20 2128     Place sequential compression device  Until discontinued      08/16/20 2128                  Merari Cheng DO  Department of Hospital Medicine   [facility]

## 2020-08-25 NOTE — PT/OT/SLP PROGRESS
Occupational Therapy   Treatment    Name: Tomasz Chavez  MRN: [id number]  Admitting Diagnosis:  Closed displaced fracture of left femoral neck  9 Days Post-Op    Recommendations:     Discharge Recommendations: (Per chart, pt going home with son on hospice)  Discharge Equipment Recommendations:  (TBD next level of care)  Barriers to discharge:  Decreased caregiver support    Assessment:     Tomasz Chavez is a 79 y.o. male with a medical diagnosis of Closed displaced fracture of left femoral neck.  Pt agreeable to OT therapy session this AM. Performance deficits affecting function are weakness, impaired endurance, impaired functional mobilty, impaired self care skills, gait instability, impaired balance, impaired cognition, decreased lower extremity function, decreased safety awareness, pain, orthopedic precautions. Pt required max verbal/tactile cues during session for safety.    Rehab Prognosis:  Fair; patient would benefit from acute skilled OT services to address these deficits and reach maximum level of function.       Plan:     Patient to be seen 5 x/week to address the above listed problems via self-care/home management, therapeutic activities, therapeutic exercises  · Plan of Care Expires: 08/18/20  · Plan of Care Reviewed with: patient    Subjective     Pain/Comfort:  ·      Objective:     Communicated with: nursing prior to session.  Patient found HOB elevated with brock catheter, bed alarm(sitter present) upon OT entry to room.    General Precautions: Standard, fall   Orthopedic Precautions:LLE weight bearing as tolerated   Braces: N/A     Occupational Performance:     Bed Mobility:    · Patient completed Rolling/Turning to Left with  moderate assistance  · Patient completed Rolling/Turning to Right with moderate assistance  · Patient completed Supine to Sit with minimum assistance  · Patient completed Sit to Supine with minimum assistance     Functional Mobility/Transfers:  · Patient completed Sit <>  Stand Transfer with minimum assistance  with  rolling walker   · Patient completed Toilet Transfer Step Transfer technique with minimum assistance with  rolling walker to BSC  · Functional Mobility: pt amb from bed <> BSC with Min/Mod A with RW with a few instances of LOB requiring mod A from OT to recover, as at one point in session, pt became agitated and refused walker and tried to take off gait belt.    Activities of Daily Living:  · Grooming: stand by assistance seated EOB for oral care  · Toileting: maximal assistance for hygiene; CGA for pt to doff brief; Total A to don brief    Treatment & Education:  Pt educated on safety during ADLs, transfers, and functional mobility.     Patient left HOB elevated with all lines intact, call button in reach, bed alarm on and sitter presentEducation:      GOALS:   Multidisciplinary Problems     Occupational Therapy Goals        Problem: Occupational Therapy Goal    Goal Priority Disciplines Outcome Interventions   Occupational Therapy Goal     OT, PT/OT Ongoing, Not Progressing    Description: Goals to be met by: discharge     Patient will increase functional independence with ADLs by performing:    LE Dressing with Minimal Assistance and AD as needed.  Grooming while seated with Supervision.  Toileting from bedside commode with Stand-by Assistance for hygiene and clothing management.   Toilet transfer to bedside commode with Stand-by Assistance.                     Time Tracking:     OT Date of Treatment: 08/25/20  OT Start Time: 0958  OT Stop Time: 1045  OT Total Time (min): 47 min    Billable Minutes:Self Care/Home Management 20  Therapeutic Activity 27    Rashmi Huff OT  8/25/2020

## 2020-08-25 NOTE — SUBJECTIVE & OBJECTIVE
Interval History:     Review of Systems   As above  Objective:     Vital Signs (Most Recent):  Temp: 96 °F (35.6 °C) (08/25/20 0333)  Pulse: 88 (08/25/20 0333)  Resp: 20 (08/25/20 0333)  BP: (!) 147/78 (08/25/20 0333)  SpO2: 96 % (08/25/20 0333) Vital Signs (24h Range):  Temp:  [96 °F (35.6 °C)-99.2 °F (37.3 °C)] 96 °F (35.6 °C)  Pulse:  [84-92] 88  Resp:  [19-20] 20  SpO2:  [96 %-98 %] 96 %  BP: (138-152)/(78-90) 147/78     Weight: 74.8 kg (165 lb)  Body mass index is 25.09 kg/m².    Intake/Output Summary (Last 24 hours) at 8/25/2020 0805  Last data filed at 8/25/2020 0600  Gross per 24 hour   Intake --   Output 400 ml   Net -400 ml      Physical Exam  Constitutional:       General: He is in acute distress.      Appearance: Normal appearance. He is not ill-appearing.   HENT:      Head: Normocephalic and atraumatic.      Nose: Nose normal.      Mouth/Throat:      Mouth: Mucous membranes are moist.   Eyes:      Pupils: Pupils are equal, round, and reactive to light.   Neck:      Musculoskeletal: Normal range of motion.   Cardiovascular:      Rate and Rhythm: Normal rate and regular rhythm.   Pulmonary:      Effort: Pulmonary effort is normal.      Breath sounds: Normal breath sounds.   Abdominal:      General: Bowel sounds are normal. There is no distension.      Tenderness: There is no abdominal tenderness.   Musculoskeletal: Normal range of motion.         General: Tenderness present.      Comments: Painful ROM of the left lower extremity   Skin:     General: Skin is warm.   Neurological:      Mental Status: He is alert.      Comments: Unable to cooperate with the exam   Psychiatric:      Comments: Unable to cooperate with the exam         Significant Labs:   CBC:   Recent Labs   Lab 08/24/20  0529 08/25/20  0548   WBC 7.80 7.19   HGB 8.2* 8.1*   HCT 25.4* 25.4*    332     CMP:   Recent Labs   Lab 08/24/20  0529 08/25/20  0548    140   K 4.2 3.9    108   CO2 23 22*   GLU 98 98   BUN 27* 28*    CREATININE 1.2 1.2   CALCIUM 8.7 8.6*   PROT 6.0 5.9*   ALBUMIN 3.1* 2.8*   BILITOT 0.9 0.8   ALKPHOS 57 56   AST 26 25   ALT 13 15   ANIONGAP 9 10   EGFRNONAA 56.4* 56.4*     All pertinent labs within the past 24 hours have been reviewed.    Significant Imaging: no new images

## 2020-08-25 NOTE — PLAN OF CARE
08/25/20 1553   Discharge Reassessment   Assessment Type Discharge Planning Reassessment   Anticipated Discharge Disposition [facility] is planning to admit pt tomorrow. Pt's son Tyler is aware ([phone]), and is thinking pt will d/c in the evening. SW informed Tyler that d/c will happen after equipment has been set up. SW/CM to continue to follow.

## 2020-08-25 NOTE — PT/OT/SLP PROGRESS
Physical Therapy Treatment    Patient Name:  Tomasz Chavez   MRN:  [id number]    Recommendations:     Discharge Recommendations:  nursing facility, skilled(SNF vs 24 hour care)   Discharge Equipment Recommendations: walker, rolling   Barriers to discharge: None    Assessment:     Tomasz Chavez is a 79 y.o. male admitted with a medical diagnosis of Closed displaced fracture of left femoral neck.  He presents with the following impairments/functional limitations:  weakness, impaired endurance, impaired functional mobilty, orthopedic precautions, impaired self care skills, gait instability, impaired balance, impaired cognition, decreased lower extremity function, decreased safety awareness Pt lying in bed upon PTA arrival. Pt min A supine<>sit and CGA sit<>stand. Pt needed to have bowel movement after sit<>stand and able to stand min A with RW without any complaints of pain or fatigue while tech set up bedside commode. Pt ambulated approx. 56 ft Mod A; pt trying to  Ambulate outside of walker and needed constant VC and TC for proper technique. Pt min A stand<>sit and mod A to return to supine. Left patient in supine, all lines intact, call light within reach and bed alarm zone 2, RN notified.       Rehab Prognosis: Fair; patient would benefit from acute skilled PT services to address these deficits and reach maximum level of function.    Recent Surgery: Procedure(s) (LRB):  HEMIARTHROPLASTY, HIP (Left) 9 Days Post-Op    Plan:     During this hospitalization, patient to be seen 6 x/week to address the identified rehab impairments via gait training, therapeutic activities, therapeutic exercises and progress toward the following goals:    · Plan of Care Expires:  09/24/20    Subjective     Chief Complaint: Endurance  Patient/Family Comments/goals:   Pain/Comfort:  · Pain Rating 1: 0/10      Objective:     Communicated with RN prior to session.  Patient found supine with bed alarm, brock catheter upon PT entry to room.      General Precautions: Standard, fall   Orthopedic Precautions:LLE weight bearing as tolerated   Braces:       Functional Mobility:  · Bed Mobility:     · Rolling Left:  moderate assistance  · Gait: approx 56 ft mod A with RW      AM-PAC 6 CLICK MOBILITY          Therapeutic Activities and Exercises:   Bed mobility; sitting EOB for trunk control and midline orientation; sit <> stands; transfer training      Patient left supine with all lines intact, call button in reach, bed alarm on and RN notified..    GOALS:   Multidisciplinary Problems     Physical Therapy Goals        Problem: Physical Therapy Goal    Goal Priority Disciplines Outcome Goal Variances Interventions   Physical Therapy Goal     PT, PT/OT Ongoing, Progressing     Description: Goals to be met by: 9/15/20     Patient will increase functional independence with mobility by performin. Supine to sit with Stand-by Assistance  2. Sit to stand transfer with Minimal Assistance  3. Gait  x 150 feet with Minimal Assistance using Rolling Walker.   4. Lower extremity exercise program x20-30 reps per handout, with supervision                     Time Tracking:     PT Received On: 20  PT Start Time: 1336     PT Stop Time: 1410  PT Total Time (min): 34 min     Billable Minutes: Gait Training 17 and Therapeutic Activity 17    Treatment Type: Treatment  PT/PTA: PTA     PTA Visit Number: 1     Boston Batista PTA  2020

## 2020-08-26 ENCOUNTER — TELEPHONE (OUTPATIENT)
Dept: UROLOGY | Facility: CLINIC | Age: 80
End: 2020-08-26

## 2020-08-26 VITALS
RESPIRATION RATE: 20 BRPM | TEMPERATURE: 97 F | HEART RATE: 98 BPM | BODY MASS INDEX: 25.01 KG/M2 | DIASTOLIC BLOOD PRESSURE: 81 MMHG | OXYGEN SATURATION: 99 % | HEIGHT: 68 IN | SYSTOLIC BLOOD PRESSURE: 126 MMHG | WEIGHT: 165 LBS

## 2020-08-26 LAB
ALBUMIN SERPL BCP-MCNC: 2.6 G/DL (ref 3.5–5.2)
ALP SERPL-CCNC: 52 U/L (ref 55–135)
ALT SERPL W/O P-5'-P-CCNC: 17 U/L (ref 10–44)
ANION GAP SERPL CALC-SCNC: 8 MMOL/L (ref 8–16)
AST SERPL-CCNC: 25 U/L (ref 10–40)
BASOPHILS # BLD AUTO: 0.01 K/UL (ref 0–0.2)
BASOPHILS NFR BLD: 0.2 % (ref 0–1.9)
BILIRUB SERPL-MCNC: 0.7 MG/DL (ref 0.1–1)
BUN SERPL-MCNC: 27 MG/DL (ref 8–23)
CALCIUM SERPL-MCNC: 8.1 MG/DL (ref 8.7–10.5)
CHLORIDE SERPL-SCNC: 107 MMOL/L (ref 95–110)
CO2 SERPL-SCNC: 24 MMOL/L (ref 23–29)
CREAT SERPL-MCNC: 1.2 MG/DL (ref 0.5–1.4)
DIFFERENTIAL METHOD: ABNORMAL
EOSINOPHIL # BLD AUTO: 0.3 K/UL (ref 0–0.5)
EOSINOPHIL NFR BLD: 4.3 % (ref 0–8)
ERYTHROCYTE [DISTWIDTH] IN BLOOD BY AUTOMATED COUNT: 14.1 % (ref 11.5–14.5)
EST. GFR  (AFRICAN AMERICAN): >60 ML/MIN/1.73 M^2
EST. GFR  (NON AFRICAN AMERICAN): 56.4 ML/MIN/1.73 M^2
GLUCOSE SERPL-MCNC: 92 MG/DL (ref 70–110)
HCT VFR BLD AUTO: 22.1 % (ref 40–54)
HGB BLD-MCNC: 7.2 G/DL (ref 14–18)
IMM GRANULOCYTES # BLD AUTO: 0.02 K/UL (ref 0–0.04)
IMM GRANULOCYTES NFR BLD AUTO: 0.3 % (ref 0–0.5)
INR PPP: 1.4
LYMPHOCYTES # BLD AUTO: 1.2 K/UL (ref 1–4.8)
LYMPHOCYTES NFR BLD: 20.4 % (ref 18–48)
MAGNESIUM SERPL-MCNC: 2 MG/DL (ref 1.6–2.6)
MCH RBC QN AUTO: 33.8 PG (ref 27–31)
MCHC RBC AUTO-ENTMCNC: 32.6 G/DL (ref 32–36)
MCV RBC AUTO: 104 FL (ref 82–98)
MONOCYTES # BLD AUTO: 0.3 K/UL (ref 0.3–1)
MONOCYTES NFR BLD: 5.1 % (ref 4–15)
NEUTROPHILS # BLD AUTO: 4.1 K/UL (ref 1.8–7.7)
NEUTROPHILS NFR BLD: 69.7 % (ref 38–73)
NRBC BLD-RTO: 0 /100 WBC
PLATELET # BLD AUTO: 303 K/UL (ref 150–350)
PMV BLD AUTO: 9.8 FL (ref 9.2–12.9)
POTASSIUM SERPL-SCNC: 3.9 MMOL/L (ref 3.5–5.1)
PROT SERPL-MCNC: 5.1 G/DL (ref 6–8.4)
PROTHROMBIN TIME: 16.1 SEC (ref 10.6–14.8)
RBC # BLD AUTO: 2.13 M/UL (ref 4.6–6.2)
SODIUM SERPL-SCNC: 139 MMOL/L (ref 136–145)
WBC # BLD AUTO: 5.88 K/UL (ref 3.9–12.7)

## 2020-08-26 PROCEDURE — 85610 PROTHROMBIN TIME: CPT

## 2020-08-26 PROCEDURE — 97530 THERAPEUTIC ACTIVITIES: CPT | Mod: CQ

## 2020-08-26 PROCEDURE — 63600175 PHARM REV CODE 636 W HCPCS: Performed by: STUDENT IN AN ORGANIZED HEALTH CARE EDUCATION/TRAINING PROGRAM

## 2020-08-26 PROCEDURE — 85025 COMPLETE CBC W/AUTO DIFF WBC: CPT

## 2020-08-26 PROCEDURE — 36415 COLL VENOUS BLD VENIPUNCTURE: CPT

## 2020-08-26 PROCEDURE — 97116 GAIT TRAINING THERAPY: CPT | Mod: CQ

## 2020-08-26 PROCEDURE — 25000003 PHARM REV CODE 250: Performed by: INTERNAL MEDICINE

## 2020-08-26 PROCEDURE — 83735 ASSAY OF MAGNESIUM: CPT

## 2020-08-26 PROCEDURE — 80053 COMPREHEN METABOLIC PANEL: CPT

## 2020-08-26 RX ORDER — TAMSULOSIN HYDROCHLORIDE 0.4 MG/1
0.8 CAPSULE ORAL NIGHTLY
Status: DISCONTINUED | OUTPATIENT
Start: 2020-08-26 | End: 2020-08-26 | Stop reason: HOSPADM

## 2020-08-26 RX ORDER — HALOPERIDOL 5 MG/1
5 TABLET ORAL 2 TIMES DAILY
Qty: 60 TABLET | Refills: 11 | Status: SHIPPED | OUTPATIENT
Start: 2020-08-26 | End: 2021-08-26

## 2020-08-26 RX ORDER — TAMSULOSIN HYDROCHLORIDE 0.4 MG/1
0.8 CAPSULE ORAL NIGHTLY
Qty: 60 CAPSULE | Refills: 11 | Status: SHIPPED | OUTPATIENT
Start: 2020-08-26 | End: 2021-08-26

## 2020-08-26 RX ADMIN — HALOPERIDOL LACTATE 5 MG: 5 INJECTION, SOLUTION INTRAMUSCULAR at 01:08

## 2020-08-26 RX ADMIN — ISOSORBIDE MONONITRATE 60 MG: 60 TABLET, EXTENDED RELEASE ORAL at 08:08

## 2020-08-26 RX ADMIN — HYDRALAZINE HYDROCHLORIDE 25 MG: 25 TABLET, FILM COATED ORAL at 01:08

## 2020-08-26 RX ADMIN — HALOPERIDOL 5 MG: 5 TABLET ORAL at 08:08

## 2020-08-26 RX ADMIN — TRAMADOL HYDROCHLORIDE 50 MG: 50 TABLET, FILM COATED ORAL at 01:08

## 2020-08-26 RX ADMIN — HYDRALAZINE HYDROCHLORIDE 25 MG: 25 TABLET, FILM COATED ORAL at 05:08

## 2020-08-26 NOTE — NURSING
Ambulance arrived to transport patient home.  DC paperwork sent with patient  Called [facility] ([phone]) and spoke with Alyssa. She will notify nurse    Called patients daughter in law, Bhumi, [phone] and informed her that patient is in route home. I provided her with [facility] phone number.

## 2020-08-26 NOTE — PT/OT/SLP PROGRESS
Occupational Therapy      Patient Name:  Tomasz Chavez   MRN:  [id number]    Patient not seen today secondary to Other (Comment)(Pt with pending d/c from hospital to hospice). Will follow-up next service date if d/c falls through.    Rashmi Huff OT  8/26/2020

## 2020-08-26 NOTE — DISCHARGE SUMMARY
[facility] Medicine  Discharge Summary      Patient Name: Tomasz Chavez  MRN: [id number]  Admission Date: 8/15/2020  Hospital Length of Stay: 11 days  Discharge Date and Time: No discharge date for patient encounter.  Attending Physician: Merari Cheng DO   Discharging Provider: Merari Cheng DO  Primary Care Provider: Kendrick Hatfield MD      HPI:   79 year old patient admitted with  Acute subcapital fracture of left femoral neck, with mild impaction  Patient was walking around today and had a fall  Later he felt severe pain on L hip area and was unable to move LLE  Patient described pain as severe/constant/with no radiation  Also he was unable to support his weight       Procedure(s) (LRB):  HEMIARTHROPLASTY, HIP (Left)      Hospital Course:   08/16  Pt today had Left hip hemiarthroplasty  No other issues  BP well controlled    08/17  Pt had confused episodes yesterday night  Sitter near bedside    8/18/2020  Pt denies any pain and is progressing with PT. Interaction limited by dementia.    8/19/2020  Pt had PM encephalopathy that has resolved. He states that he has no pain, no problems.    8/20/2020  Mr Chavez is cooperative and working with PT this AM. No agitation    8/21/2020  Mr Chavez has had no agitation. He is cooperating with PT and staff.    8/22/2020  Pt had an episode of somnolence S/P a dose of pain medication. The patient recovered in a very short period of time and his pain medication was stopped for the present time    8/23/2020  Mr Riggins has noted pain in the post op extremity. Pt has an episode of encephalopathy and pain medications were discontinued. Pt had 6-8/10 painful face this date. He is unable to provide any more information.    8/24/2020  Mr Chavez had an episode of urinary retention last PM. He will not tolerate in and out cath's and he could pull out a brock. I have spoken to his son who desires to take the patient home on hospice. It is the only reasonable  "option as all facilities have refused him.     8/25/2020: hospice pending.     8/26:  Home hospice arranged.  Patient to be transferred by ambulance home.  Patient to be discharged with [facility].  Per Urology recommendations Flomax increased from 0.4-0.8 mg q.day. Patient to follow-up in 1 week with Urology for voiding trial.  Physical exam on day of discharge:   /81 (BP Location: Right arm, Patient Position: Lying)   Pulse 98   Temp 97.4 °F (36.3 °C) (Axillary)   Resp 20   Ht 5' 8" (1.727 m)   Wt 74.8 kg (165 lb)   SpO2 99%   BMI 25.09 kg/m²     Gen: nad, resting quietly in bed, responds to voice  Resp: CTA B/l  AB: +bs soft ntnd  Skin: dry, warm  Gu PATEL in place      Consults:   Consults (From admission, onward)        Status Ordering Provider     Inpatient consult to Hospitalist  Once     Provider:  Charli Hightower MD    Acknowledged CASEY TSAI     Inpatient consult to Hospitalist  Once     Provider:  Casey Tsai MD    Acknowledged CASEY TSAI     Inpatient consult to Orthopedic Surgery  Once     Provider:  Rubio Brown MD    Completed CASEY TSAI     Inpatient consult to   Once     Provider:  (Not yet assigned)    Completed CASEY TSAI     Inpatient consult to   Once     Provider:  (Not yet assigned)    Acknowledged JEAN-PIERRE SNYDER     Inpatient consult to Urology  Once     Provider:  Aliyah Basurto MD    Completed JEAN-PIERRE SNYDER          No new Assessment & Plan notes have been filed under this hospital service since the last note was generated.  Service: Hospital Medicine    Final Active Diagnoses:    Diagnosis Date Noted POA    PRINCIPAL PROBLEM:  Closed displaced fracture of left femoral neck [S72.002A] 08/15/2020 Yes    Urinary retention [R33.9] 08/25/2020 No    BRITT (acute kidney injury) [N17.9] 08/17/2020 No    Anticoagulated [Z79.01] 08/17/2020 Not Applicable    HTN (hypertension) [I10] 08/15/2020 Yes    DNR (do not resuscitate) [Z66] " 08/10/2020 Yes    CKD (chronic kidney disease), stage III [N18.3] 07/09/2020 Yes    Dementia [F03.90] 07/09/2020 Yes      Problems Resolved During this Admission:       Discharged Condition: stable    Disposition: Hospice/Home    Follow Up:  Follow-up Information     Kendrick Hatfield MD.    Specialty: Family Medicine  Contact information:  [address]  [place] [address]  [phone]             Aliyah Basurto MD. Schedule an appointment as soon as possible for a visit in 1 week.    Specialty: Urology  Why: for voiding trial   Contact information:  [address] [place] [address] [address]  [address]  [place] [address]  [phone]             Rubio Brown MD. Schedule an appointment as soon as possible for a visit in 1 week.    Specialties: Sports Medicine, Orthopedic Surgery  Contact information:  [address] [place] [address]  [address]  [place] [address]  [phone]                 Patient Instructions:      Diet Adult Regular     Notify your health care provider if you experience any of the following:  persistent nausea and vomiting or diarrhea     Notify your health care provider if you experience any of the following:  severe uncontrolled pain     Notify your health care provider if you experience any of the following:  difficulty breathing or increased cough     Activity as tolerated       Significant Diagnostic Studies: Labs:   CMP   Recent Labs   Lab 08/25/20  0548 08/26/20  0405    139   K 3.9 3.9    107   CO2 22* 24   GLU 98 92   BUN 28* 27*   CREATININE 1.2 1.2   CALCIUM 8.6* 8.1*   PROT 5.9* 5.1*   ALBUMIN 2.8* 2.6*   BILITOT 0.8 0.7   ALKPHOS 56 52*   AST 25 25   ALT 15 17   ANIONGAP 10 8   ESTGFRAFRICA >60.0 >60.0   EGFRNONAA 56.4* 56.4*    and All labs within the past 24 hours have been reviewed    Pending Diagnostic Studies:     None         Medications:  Reconciled Home Medications:      Medication List      START taking these medications    haloperidoL 5 MG tablet  Commonly known as:  HALDOL  Take 1 tablet (5 mg total) by mouth 2 (two) times daily.     tamsulosin 0.4 mg Cap  Commonly known as: FLOMAX  Take 2 capsules (0.8 mg total) by mouth every evening.        CONTINUE taking these medications    atorvastatin 10 MG tablet  Commonly known as: LIPITOR  Take 10 mg by mouth nightly.     cloNIDine 0.1 MG tablet  Commonly known as: CATAPRES  Take 1 tablet (0.1 mg total) by mouth 3 (three) times daily as needed (if Systolic blood pressure greater than 160 mm of Hg).     docusate sodium 100 MG capsule  Commonly known as: COLACE  Take 100 mg by mouth 2 (two) times daily.     donepeziL 10 MG tablet  Commonly known as: ARICEPT  Take 10 mg by mouth every evening.     ELIQUIS 5 mg Tab  Generic drug: apixaban  Take 1 tablet by mouth 2 (two) times daily.     isosorbide mononitrate 60 MG 24 hr tablet  Commonly known as: IMDUR  Take 1 tablet (60 mg total) by mouth once daily.     levothyroxine 25 MCG tablet  Commonly known as: SYNTHROID  Take 1 tablet by mouth once daily.     multivitamin capsule  Take 1 capsule by mouth once daily.     pantoprazole 40 MG tablet  Commonly known as: PROTONIX  Take 40 mg by mouth once daily.     potassium chloride SA 10 MEQ tablet  Commonly known as: K-DUR,KLOR-CON  Take 1 tablet by mouth once daily.        STOP taking these medications    amLODIPine 10 MG tablet  Commonly known as: NORVASC     escitalopram oxalate 10 MG tablet  Commonly known as: LEXAPRO     furosemide 40 MG tablet  Commonly known as: LASIX     hydroCHLOROthiazide 25 MG tablet  Commonly known as: HYDRODIURIL     lisinopriL 40 MG tablet  Commonly known as: PRINIVIL,ZESTRIL     metoprolol succinate 50 MG 24 hr tablet  Commonly known as: TOPROL-XL     mirtazapine 30 MG tablet  Commonly known as: REMERON            Indwelling Lines/Drains at time of discharge:   Lines/Drains/Airways     Drain                 Urethral Catheter 08/23/20 2130 Non-latex 16 Fr. 2 days                Time spent on the discharge of  patient: 32 minutes  Patient was seen and examined on the date of discharge and determined to be suitable for discharge.         Merari Cheng DO  Department of Hospital Medicine  [facility]

## 2020-08-26 NOTE — CONSULTS
Interprofessional Telephone Consult/Progress Note  Specialty Consulted: Urology  Staff Consulted: Dr.Jennifer Basurto  Date of Service: 08/25/2020      Consulting Physician: Bill  Reason for Consult: urinary retention  Time spent reviewing records, making plan and formulating plan: 25. More than half the time was devoted to medical consultative verbal/internet discussion.    Each patient to whom he or she provides medical services by telemedicine is:    (1)The patient is aware of and consented to the discussion.   (2) informed of the relationship between the physician and patient and the respective role of any other health care provider with respect to management of the patient; and   (3) notified that he or she may decline to receive medical services by telemedicine and may withdraw from such care at any time.    This service was not originating from a related E/M service provided within the previous 7 days nor will  to an E/M service or procedure within the next 24 hours or my soonest available appointment.  Prevailing standard of care was able to be met in this audio-only visit.    Both a written plan and a verbal/internet discussion were discussed with the consulting physician  Coding: Verbal and Written Plan    86450- 21 to 30 minutes      [facility] Consult Note - [facility]  Staff: MD Sb  Group:Sb/Pricila/Armen/Chaitanya  Referring provider and please cc: Marjorie  PCP: Kendrick Hatfield MD    [facility] Utilization:       Subjective:          Pt admitted multiple times for repeated syncope then on 8/16 after fall. Found to have left femoral head fracture. Underwent L hemiarthroplasty. Since then has had persistent dementia and intermittent encephalopathy. On 8/23 pt had an episode of retention. 900 on bladder scan but only 400cc drained? Urology was consulted on 8/24 bc pt not a good candidate for CIC or brock due to dementia.     Son has decided to take pt home on  hospice as all other facilities have refused him.     Obtained history from his son/Tyler:  Pt was living at home with son. He had severe dementia before he went into the hospital. He was having urge and unaware incontinence, mild to moderate but usually would make it to the bathroom. In the hospital he has been wearing depends since he went on the hospital. No h/o a urologists. Has not had a uti. He then had a catheter for 2 weeks then on  they removed the catheter and he couldn't urinate. Replaced catheter on .   Having bm daily. Not really ambulating and he's supposed to go to rehab. Very weak.     Urine history:   8/15/20 1+bld/1+prot, 11 rbc/7 hyaline  20  Tr ketones    ROS as above       PMHx:  Past Medical History:   Diagnosis Date    Dementia     GERD (gastroesophageal reflux disease)     Hypertension     Sleep apnea     does not use machine    Wears glasses        PSHx:  Past Surgical History:   Procedure Laterality Date    APPENDECTOMY      CHOLECYSTECTOMY      FINGER SURGERY      laceration    HEMIARTHROPLASTY OF HIP Left 2020    Procedure: HEMIARTHROPLASTY, HIP;  Surgeon: Rubio Brown MD;  [facility];  Service: Orthopedics;  Laterality: Left;    HERNIA REPAIR           Stents/Valves/Foreign Bodies/Cardiac Evaluation/Cardiologist:   GI:      Fam Hx:    Family History   Problem Relation Age of Onset    Hypertension Father        Soc Hx:  Social History     Tobacco Use    Smoking status: Former Smoker     Quit date:      Years since quittin.6   Substance Use Topics    Alcohol use: No    Drug use: No       Patient's occupation:   Children: son  Lives in:[place]     Allergies:  Patient has no known allergies.    Anticoagulation/Aspirin: none    Objective:     Vitals:    20 1224   BP: (!) 163/86   Pulse: (!) 111   Resp: 18   Temp: 97.6 °F (36.4 °C)       Phone visit    Pelvic exam deferred    LABS REVIEW:  Recent Labs   Lab 20  2263  08/24/20  0529 08/25/20  0548   WBC 6.96 7.80 7.19   Hemoglobin 8.4 L 8.2 L 8.1 L   Hematocrit 26.3 L 25.4 L 25.4 L   Platelets 285 317 332   ]  Recent Labs   Lab 08/23/20  0525 08/24/20  0529 08/25/20  0548   Sodium 139 140 140   Potassium 3.9 4.2 3.9   Chloride 106 108 108   CO2 24 23 22 L   BUN, Bld 30 H 27 H 28 H   Creatinine 1.2 1.2 1.2   Glucose 105 98 98   Calcium 8.3 L 8.7 8.6 L   Magnesium 2.3 2.1 2.1   Alkaline Phosphatase 55 57 56   Total Protein 6.0 6.0 5.9 L   Albumin 2.9 L 3.1 L 2.8 L   Total Bilirubin 1.0 0.9 0.8   AST 23 26 25   ALT 11 13 15   ]    No results found for: LABA1C, HGBA1C     Pertinent urologic PATHOLOGY REVIEW:  See hpi for recent    Pertinent Urologic RADIOGRAPHIC REVIEW:  See hpi for recent       Assessment:     Tomasz Chavez is a 79 y.o. male with   1. Closed fracture of hip, unspecified laterality, initial encounter    2. Fall    3. Preop cardiovascular exam    4. Closed displaced fracture of left femoral neck        urology consulted for:  Urinary retention     Plan:     Urinary retention is multifactorial in a hospital setting  -medications: pain medicine, beta agonists, anti-muscarinics  -ambulatory status  -constipation  -bph   -post-op from anesthesia or pain medicine  -urinary tract infection  -neurogenic    In this pt the factors that most likely contribute to retention are: ambulatory status, medications  When brock was placed patient  drained 800 or 400? and had no urge to void.   My recommendation is to:  1. Continue brock x 2 more weeks   2. Start flomax/tamsulosin, pt will need to go home on this.   3. Will have my nurse Call his hospice nursing care this Thursday to organize a voiding trial at home.   4. They can remove the catheter then return the following day to check reisdual with a catheter and if > 350 then leave catheter change every 4 weeks and discontinue flomax.  5. If he cannot void, discussed with son and will likely remain with catheter. Went over  asymptomatic bacteriuria.       Aliyah Basurto MD

## 2020-08-26 NOTE — PT/OT/SLP PROGRESS
"Physical Therapy Treatment    Patient Name:  Tomasz Chavez   MRN:  [id number]    Recommendations:     Discharge Recommendations:  nursing facility, skilled(SNF vs 24 hour care)   Discharge Equipment Recommendations: walker, rolling   Barriers to discharge: Decreased caregiver support    Assessment:     Tomasz Chavez is a 79 y.o. male admitted with a medical diagnosis of Closed displaced fracture of left femoral neck.  He presents with the following impairments/functional limitations:  weakness, impaired endurance, impaired self care skills, impaired functional mobilty, gait instability, impaired balance, decreased safety awareness, decreased lower extremity function, pain. Pt is impulsive during tx noting Pt with decreased safety awareness and should have assistance with all mobility to ensure safety. Pt  With several attempts to stand before instructed while sitting EOB requiring verbal and tactile cuing to stay sitting EOB. Pt ambulated with very narrow EMILY noting Pt stepping on his toes and unable to correct with verbal cuing. Pt required mod A to advance RLE during last 8 feet of gait training.  Pt with improvements in walker management on this date. Pt reported having BM during gait training noting Pt was returned to supine position for brief change.  Continue with PT and POC.     Rehab Prognosis: Fair; patient would benefit from acute skilled PT services to address these deficits and reach maximum level of function.    Recent Surgery: Procedure(s) (LRB):  HEMIARTHROPLASTY, HIP (Left) 10 Days Post-Op    Plan:     During this hospitalization, patient to be seen 6 x/week to address the identified rehab impairments via gait training, therapeutic activities, therapeutic exercises and progress toward the following goals:    · Plan of Care Expires:  09/24/20    Subjective     Chief Complaint: Pt reported "I am pooing" while ambulating   Patient/Family Comments/goals: return home   Pain/Comfort:  · Pain Rating 1: " (not rated)  · Location 1: back      Objective:     Communicated with RN prior to session.  Patient found HOB elevated with bed alarm, brock catheter(sitter present) upon PT entry to room.     General Precautions: Standard, fall   Orthopedic Precautions:LLE weight bearing as tolerated   Braces:       Functional Mobility:  · Bed Mobility:     · Supine to Sit: moderate assistance  · Sit to Supine: minimum assistance  · Transfers:     · Sit to Stand:  contact guard assistance with rolling walker  · Gait: 20 feet with RW with min/ mod A       AM-PAC 6 CLICK MOBILITY          Therapeutic Activities and Exercises:   bed mobility; sitting EOB for trunk control and midline orientation; sit<> stands; transfer training; gait training     Patient left HOB elevated with all lines intact, call button in reach and RN and sitter present present..    GOALS:   Multidisciplinary Problems     Physical Therapy Goals        Problem: Physical Therapy Goal    Goal Priority Disciplines Outcome Goal Variances Interventions   Physical Therapy Goal     PT, PT/OT Ongoing, Progressing     Description: Goals to be met by: 9/15/20     Patient will increase functional independence with mobility by performin. Supine to sit with Stand-by Assistance  2. Sit to stand transfer with Minimal Assistance  3. Gait  x 150 feet with Minimal Assistance using Rolling Walker.   4. Lower extremity exercise program x20-30 reps per handout, with supervision                     Time Tracking:     PT Received On: 20  PT Start Time: 917     PT Stop Time: 940  PT Total Time (min): 23 min     Billable Minutes: Gait Training 10 and Therapeutic Activity 13    Treatment Type: Treatment  PT/PTA: PTA     PTA Visit Number: 2     Jane Hammant, PTA  2020

## 2020-08-26 NOTE — PLAN OF CARE
08/26/20 1705   Final Note   Assessment Type Final Discharge Note   Anticipated Discharge Disposition HospiceHome   Post-Acute Status   Post-Acute Authorization Hospice   Post-Acute Placement Status Discharge Plan Changed   Hospice Status Set-up Complete   Call placed to patient's son and daughter in law today discussing discharge home with hospice. Spoke with Roberth at [facility], equipment set up in home. Ambulance auth received per [facility] #[id number]. [facility] ambulance called and transport arranged and will arrive within the hour. Nursing staff to notify daughter in law Bhumi [phone] and [facility] after hours [phone] when [facility] arrives to transport home.

## 2020-08-28 NOTE — PT/OT/SLP DISCHARGE
Occupational Therapy Discharge Summary    Tomasz Chavez  MRN: [id number]   Principal Problem: Closed displaced fracture of left femoral neck      Patient Discharged from acute Occupational Therapy on 8/26/2020.  Please refer to prior OT note dated 8/25/2020 for functional status.    Assessment:      Patient appropriate for care in another setting.    Objective:     GOALS:   Multidisciplinary Problems     Occupational Therapy Goals        Problem: Occupational Therapy Goal    Goal Priority Disciplines Outcome Interventions   Occupational Therapy Goal     OT, PT/OT Ongoing, Not Progressing    Description: Goals to be met by: discharge     Patient will increase functional independence with ADLs by performing:    LE Dressing with Minimal Assistance and AD as needed.  Grooming while seated with Supervision.  Toileting from bedside commode with Stand-by Assistance for hygiene and clothing management.   Toilet transfer to bedside commode with Stand-by Assistance.                     Reasons for Discontinuation of Therapy Services  Transfer to alternate level of care.      Plan:     Patient Discharged to: Palliative Care/Hospice    Rashmi Huff, DAYANNA  8/28/2020

## 2021-09-23 NOTE — PLAN OF CARE
Problem: Occupational Therapy Goal  Goal: Occupational Therapy Goal  Description: Goals to be met by: discharge     Patient will increase functional independence with ADLs by performing:    LE Dressing with Minimal Assistance and AD as needed.  Grooming while seated with Supervision.  Toileting from bedside commode with Stand-by Assistance for hygiene and clothing management.   Toilet transfer to bedside commode with Stand-by Assistance.    Outcome: Ongoing, Progressing      successful removal of ILR from subcutaneous tissue left upper chest. hemostasis achieved w 4.0 vicryl suture and dermabond

## 2021-12-28 NOTE — PLAN OF CARE
08/26/20 1712   Medicare Message   Important Message from Medicare regarding Discharge Appeal Rights Given to patient/caregiver;Explained to patient/caregiver;Other (comments)  (via telephone per son: Tyler Chavez)   Date IMM was signed 08/26/20   Time IMM was signed 1610      1/7/2022    Patient: Ludmila Huddleston   YOB: 1957   Date of Visit: 12/28/2021     Palomo Arita NP  [address] [place] [address] [address] [facility] [address]    Dear Palomo Arita NP,      Thank you for referring Mr. Lala Ngo to [facility] for evaluation. My notes for this consultation are attached. If you have questions, please do not hesitate to call me. I look forward to following your patient along with you.       Sincerely,    Levon Cole MD

## 2023-04-16 NOTE — PT/OT/SLP DISCHARGE
Occupational Therapy Discharge Summary    Tomasz Chavez  MRN: [id number]   Principal Problem: Symptomatic bradycardia      Patient Discharged from acute Occupational Therapy on 7/17/2020.  Please refer to prior OT note dated 7/17/2020 for functional status.    Assessment:      Patient appropriate for care in another setting.    Objective:     GOALS:   Multidisciplinary Problems     Occupational Therapy Goals     Not on file          Multidisciplinary Problems (Resolved)        Problem: Occupational Therapy Goal    Goal Priority Disciplines Outcome Interventions   Occupational Therapy Goal   (Resolved)     OT, PT/OT Met    Description: Goals to be met by: discharge    Patient will increase functional independence with ADLs by performing:    LE Dressing with Stand-by Assistance.  Grooming while standing at sink with Stand-by Assistance.  Toileting from toilet with Stand-by Assistance for hygiene and clothing management.   Toilet transfer to toilet with Stand-by Assistance.                     Reasons for Discontinuation of Therapy Services  Transfer to alternate level of care.      Plan:     Patient Discharged to: Skilled Nursing Facility    Hector Boyle, OT  7/20/2020  
Xray Chest 2 Views PA/Lat

## (undated) DEVICE — SOLUTION NACL 0.9% 3000ML

## (undated) DEVICE — DRAPE U-SLOT 1019

## (undated) DEVICE — TRAY SKIN PREP DRY

## (undated) DEVICE — PULSAEVAC 0210-158-000

## (undated) DEVICE — SUTURE STRATAFIX SPIRAL 3-0 60CM PS-2

## (undated) DEVICE — Device

## (undated) DEVICE — GLOVE BIOGEL PI GOLD SZ  8.5

## (undated) DEVICE — UNDERGLOVE BIOGEL PI MICRO BLUE SZ 8

## (undated) DEVICE — DRAPE C-ARM (FITS NEW C-ARM) 07-CA108

## (undated) DEVICE — DRESSING POST OP MEPILEX  AG 4X10

## (undated) DEVICE — SOLUTION IRRI NS BOTTLE 1000ML R5200-01

## (undated) DEVICE — SUTURE VICRYL 2-0 CT-1 36 VCP945H

## (undated) DEVICE — GLOVE BIOGEL MICRO SURGEON PINK SZ 7.5

## (undated) DEVICE — DRAPE IMPERVIOUS SPLIT 89331

## (undated) DEVICE — GOWN SURG. AERO CHROME XXL

## (undated) DEVICE — SOLUTION IRRI H2O BOTTLE 1000ML

## (undated) DEVICE — HOOD TOGA ZIPPER 0400-820-100

## (undated) DEVICE — ALCOHOL 16OZ

## (undated) DEVICE — PAD BOVIE ADULT

## (undated) DEVICE — TIP BOVIE 6.5 0014

## (undated) DEVICE — UNDERGLOVE BIOGEL PI MICRO BLUE SZ 7

## (undated) DEVICE — UNDERGLOVE BIOGEL MICRO BLUE SZ 8.5

## (undated) DEVICE — SEALER BIPOLAR W/LIGHT MIN 5 EA.23-301-1

## (undated) DEVICE — SUTURE VICRYL 1 CT-1 27 J261H

## (undated) DEVICE — BLADE SAGITTAL 1.27 4125-127-090

## (undated) DEVICE — GLOVE BIOGEL MICRO SURGEON PINK SZ 7

## (undated) DEVICE — DRAPE C-ARMOR FLOUROSCAN IMAGING 5523

## (undated) DEVICE — TRAY TOTAL HIP